# Patient Record
Sex: MALE | Race: WHITE | NOT HISPANIC OR LATINO | Employment: OTHER | URBAN - METROPOLITAN AREA
[De-identification: names, ages, dates, MRNs, and addresses within clinical notes are randomized per-mention and may not be internally consistent; named-entity substitution may affect disease eponyms.]

---

## 2017-01-19 ENCOUNTER — HOSPITAL ENCOUNTER (OUTPATIENT)
Dept: CARDIOLOGY | Facility: HOSPITAL | Age: 82
Setting detail: RECURRING SERIES
Discharge: HOME | End: 2017-01-19

## 2017-01-19 PROCEDURE — 36416 COLLJ CAPILLARY BLOOD SPEC: CPT | Performed by: INTERNAL MEDICINE

## 2017-01-19 PROCEDURE — 85610 PROTHROMBIN TIME: CPT | Performed by: INTERNAL MEDICINE

## 2017-01-27 ENCOUNTER — HOSPITAL ENCOUNTER (OUTPATIENT)
Dept: CARDIOLOGY | Facility: HOSPITAL | Age: 82
Setting detail: RECURRING SERIES
Discharge: HOME | End: 2017-01-27

## 2017-01-27 PROCEDURE — 85610 PROTHROMBIN TIME: CPT | Performed by: INTERNAL MEDICINE

## 2017-01-27 PROCEDURE — 36416 COLLJ CAPILLARY BLOOD SPEC: CPT | Performed by: INTERNAL MEDICINE

## 2017-02-03 ENCOUNTER — HOSPITAL ENCOUNTER (OUTPATIENT)
Dept: CARDIOLOGY | Facility: HOSPITAL | Age: 82
Setting detail: RECURRING SERIES
Discharge: HOME | End: 2017-02-03

## 2017-02-03 ENCOUNTER — CLINICAL SUPPORT NO REQUIREMENTS (OUTPATIENT)
Dept: CARDIOLOGY | Facility: CLINIC | Age: 82
End: 2017-02-03

## 2017-02-03 DIAGNOSIS — I47.20 VENTRICULAR TACHYCARDIA (HCC): Primary | ICD-10-CM

## 2017-02-03 PROCEDURE — 36416 COLLJ CAPILLARY BLOOD SPEC: CPT

## 2017-02-03 PROCEDURE — 93296 REM INTERROG EVL PM/IDS: CPT | Performed by: INTERNAL MEDICINE

## 2017-02-03 PROCEDURE — 85610 PROTHROMBIN TIME: CPT

## 2017-02-03 PROCEDURE — 93295 DEV INTERROG REMOTE 1/2/MLT: CPT | Performed by: INTERNAL MEDICINE

## 2017-02-10 ENCOUNTER — HOSPITAL ENCOUNTER (OUTPATIENT)
Dept: CARDIOLOGY | Facility: HOSPITAL | Age: 82
Setting detail: RECURRING SERIES
Discharge: HOME | End: 2017-02-10

## 2017-02-10 PROCEDURE — 85610 PROTHROMBIN TIME: CPT

## 2017-02-10 PROCEDURE — 36416 COLLJ CAPILLARY BLOOD SPEC: CPT

## 2017-02-20 ENCOUNTER — HOSPITAL ENCOUNTER (OUTPATIENT)
Dept: SLEEP MEDICINE | Facility: HOSPITAL | Age: 82
Discharge: HOME | End: 2017-02-20
Admitting: INTERNAL MEDICINE

## 2017-02-20 PROCEDURE — G0463 HOSPITAL OUTPT CLINIC VISIT: HCPCS

## 2017-02-22 ENCOUNTER — OFFICE VISIT (OUTPATIENT)
Dept: ONCOLOGY | Facility: CLINIC | Age: 82
End: 2017-02-22

## 2017-02-22 ENCOUNTER — LAB (OUTPATIENT)
Dept: LAB | Facility: HOSPITAL | Age: 82
End: 2017-02-22

## 2017-02-22 VITALS
HEIGHT: 70 IN | WEIGHT: 227 LBS | RESPIRATION RATE: 16 BRPM | BODY MASS INDEX: 32.5 KG/M2 | OXYGEN SATURATION: 95 % | DIASTOLIC BLOOD PRESSURE: 52 MMHG | SYSTOLIC BLOOD PRESSURE: 100 MMHG | HEART RATE: 68 BPM | TEMPERATURE: 98.3 F

## 2017-02-22 DIAGNOSIS — G40.909 SEIZURE DISORDER (HCC): ICD-10-CM

## 2017-02-22 DIAGNOSIS — D12.6 TUBULAR ADENOMA OF COLON: Primary | ICD-10-CM

## 2017-02-22 DIAGNOSIS — K92.2 GASTROINTESTINAL HEMORRHAGE: ICD-10-CM

## 2017-02-22 LAB
ALBUMIN SERPL-MCNC: 4.1 G/DL (ref 3.5–5.2)
ALBUMIN/GLOB SERPL: 1.4 G/DL (ref 1.1–2.4)
ALP SERPL-CCNC: 83 U/L (ref 38–116)
ALT SERPL W P-5'-P-CCNC: 14 U/L (ref 0–41)
ANION GAP SERPL CALCULATED.3IONS-SCNC: 13.6 MMOL/L
AST SERPL-CCNC: 17 U/L (ref 0–40)
BASOPHILS # BLD AUTO: 0.01 10*3/MM3 (ref 0–0.1)
BASOPHILS NFR BLD AUTO: 0.2 % (ref 0–1.1)
BILIRUB SERPL-MCNC: 0.9 MG/DL (ref 0.1–1.2)
BUN BLD-MCNC: 19 MG/DL (ref 6–20)
BUN/CREAT SERPL: 16 (ref 7.3–30)
CALCIUM SPEC-SCNC: 9.4 MG/DL (ref 8.5–10.2)
CHLORIDE SERPL-SCNC: 100 MMOL/L (ref 98–107)
CO2 SERPL-SCNC: 28.4 MMOL/L (ref 22–29)
CREAT BLD-MCNC: 1.19 MG/DL (ref 0.7–1.3)
DEPRECATED RDW RBC AUTO: 48.4 FL (ref 37–49)
EOSINOPHIL # BLD AUTO: 0.13 10*3/MM3 (ref 0–0.36)
EOSINOPHIL NFR BLD AUTO: 2.8 % (ref 1–5)
ERYTHROCYTE [DISTWIDTH] IN BLOOD BY AUTOMATED COUNT: 13 % (ref 11.7–14.5)
FERRITIN SERPL-MCNC: 296.1 NG/ML (ref 30–400)
GFR SERPL CREATININE-BSD FRML MDRD: 58 ML/MIN/1.73
GLOBULIN UR ELPH-MCNC: 2.9 GM/DL (ref 1.8–3.5)
GLUCOSE BLD-MCNC: 207 MG/DL (ref 74–124)
HCT VFR BLD AUTO: 40.9 % (ref 40–49)
HGB BLD-MCNC: 13.1 G/DL (ref 13.5–16.5)
IMM GRANULOCYTES # BLD: 0.03 10*3/MM3 (ref 0–0.03)
IMM GRANULOCYTES NFR BLD: 0.7 % (ref 0–0.5)
LYMPHOCYTES # BLD AUTO: 1.03 10*3/MM3 (ref 1–3.5)
LYMPHOCYTES NFR BLD AUTO: 22.5 % (ref 20–49)
MCH RBC QN AUTO: 32.4 PG (ref 27–33)
MCHC RBC AUTO-ENTMCNC: 32 G/DL (ref 32–35)
MCV RBC AUTO: 101.2 FL (ref 83–97)
MONOCYTES # BLD AUTO: 0.41 10*3/MM3 (ref 0.25–0.8)
MONOCYTES NFR BLD AUTO: 9 % (ref 4–12)
NEUTROPHILS # BLD AUTO: 2.96 10*3/MM3 (ref 1.5–7)
NEUTROPHILS NFR BLD AUTO: 64.8 % (ref 39–75)
NRBC BLD MANUAL-RTO: 0 /100 WBC (ref 0–0)
PLATELET # BLD AUTO: 131 10*3/MM3 (ref 150–375)
PMV BLD AUTO: 10.4 FL (ref 8.9–12.1)
POTASSIUM BLD-SCNC: 4.6 MMOL/L (ref 3.5–4.7)
PROT SERPL-MCNC: 7 G/DL (ref 6.3–8)
RBC # BLD AUTO: 4.04 10*6/MM3 (ref 4.3–5.5)
SODIUM BLD-SCNC: 142 MMOL/L (ref 134–145)
WBC NRBC COR # BLD: 4.57 10*3/MM3 (ref 4–10)

## 2017-02-22 PROCEDURE — 80053 COMPREHEN METABOLIC PANEL: CPT

## 2017-02-22 PROCEDURE — 85025 COMPLETE CBC W/AUTO DIFF WBC: CPT

## 2017-02-22 PROCEDURE — 99213 OFFICE O/P EST LOW 20 MIN: CPT | Performed by: INTERNAL MEDICINE

## 2017-02-22 PROCEDURE — 82728 ASSAY OF FERRITIN: CPT

## 2017-02-22 PROCEDURE — 36415 COLL VENOUS BLD VENIPUNCTURE: CPT

## 2017-03-06 ENCOUNTER — HOSPITAL ENCOUNTER (OUTPATIENT)
Dept: CARDIOLOGY | Facility: HOSPITAL | Age: 82
Setting detail: RECURRING SERIES
Discharge: HOME | End: 2017-03-06

## 2017-03-06 PROCEDURE — 36416 COLLJ CAPILLARY BLOOD SPEC: CPT

## 2017-03-06 PROCEDURE — 85610 PROTHROMBIN TIME: CPT

## 2017-03-13 ENCOUNTER — HOSPITAL ENCOUNTER (OUTPATIENT)
Dept: CARDIOLOGY | Facility: HOSPITAL | Age: 82
Setting detail: RECURRING SERIES
Discharge: HOME | End: 2017-03-13

## 2017-03-13 PROCEDURE — 36416 COLLJ CAPILLARY BLOOD SPEC: CPT

## 2017-03-13 PROCEDURE — 85610 PROTHROMBIN TIME: CPT

## 2017-03-20 ENCOUNTER — HOSPITAL ENCOUNTER (OUTPATIENT)
Dept: CARDIOLOGY | Facility: HOSPITAL | Age: 82
Setting detail: RECURRING SERIES
Discharge: HOME | End: 2017-03-20

## 2017-03-20 PROCEDURE — 85610 PROTHROMBIN TIME: CPT

## 2017-03-20 PROCEDURE — 36416 COLLJ CAPILLARY BLOOD SPEC: CPT

## 2017-04-03 ENCOUNTER — HOSPITAL ENCOUNTER (OUTPATIENT)
Dept: CARDIOLOGY | Facility: HOSPITAL | Age: 82
Setting detail: RECURRING SERIES
Discharge: HOME | End: 2017-04-03

## 2017-04-03 PROCEDURE — 36416 COLLJ CAPILLARY BLOOD SPEC: CPT

## 2017-04-03 PROCEDURE — 85610 PROTHROMBIN TIME: CPT

## 2017-04-12 RX ORDER — WARFARIN SODIUM 5 MG/1
TABLET ORAL
Qty: 90 TABLET | Refills: 0 | Status: SHIPPED | OUTPATIENT
Start: 2017-04-12 | End: 2017-06-15 | Stop reason: SDUPTHER

## 2017-04-19 ENCOUNTER — LAB (OUTPATIENT)
Dept: LAB | Facility: HOSPITAL | Age: 82
End: 2017-04-19

## 2017-04-19 ENCOUNTER — CLINICAL SUPPORT (OUTPATIENT)
Dept: ONCOLOGY | Facility: HOSPITAL | Age: 82
End: 2017-04-19

## 2017-04-19 DIAGNOSIS — G40.909 SEIZURE DISORDER (HCC): ICD-10-CM

## 2017-04-19 DIAGNOSIS — D12.6 TUBULAR ADENOMA OF COLON: ICD-10-CM

## 2017-04-19 LAB
BASOPHILS # BLD AUTO: 0.02 10*3/MM3 (ref 0–0.1)
BASOPHILS NFR BLD AUTO: 0.4 % (ref 0–1.1)
DEPRECATED RDW RBC AUTO: 47.1 FL (ref 37–49)
EOSINOPHIL # BLD AUTO: 0.12 10*3/MM3 (ref 0–0.36)
EOSINOPHIL NFR BLD AUTO: 2.4 % (ref 1–5)
ERYTHROCYTE [DISTWIDTH] IN BLOOD BY AUTOMATED COUNT: 13.2 % (ref 11.7–14.5)
HCT VFR BLD AUTO: 35.8 % (ref 40–49)
HGB BLD-MCNC: 12.5 G/DL (ref 13.5–16.5)
IMM GRANULOCYTES # BLD: 0.04 10*3/MM3 (ref 0–0.03)
IMM GRANULOCYTES NFR BLD: 0.8 % (ref 0–0.5)
LYMPHOCYTES # BLD AUTO: 1.06 10*3/MM3 (ref 1–3.5)
LYMPHOCYTES NFR BLD AUTO: 21.6 % (ref 20–49)
MCH RBC QN AUTO: 34.4 PG (ref 27–33)
MCHC RBC AUTO-ENTMCNC: 34.9 G/DL (ref 32–35)
MCV RBC AUTO: 98.6 FL (ref 83–97)
MONOCYTES # BLD AUTO: 0.53 10*3/MM3 (ref 0.25–0.8)
MONOCYTES NFR BLD AUTO: 10.8 % (ref 4–12)
NEUTROPHILS # BLD AUTO: 3.14 10*3/MM3 (ref 1.5–7)
NEUTROPHILS NFR BLD AUTO: 64 % (ref 39–75)
NRBC BLD MANUAL-RTO: 0 /100 WBC (ref 0–0)
PLATELET # BLD AUTO: 130 10*3/MM3 (ref 150–375)
PMV BLD AUTO: 11 FL (ref 8.9–12.1)
RBC # BLD AUTO: 3.63 10*6/MM3 (ref 4.3–5.5)
WBC NRBC COR # BLD: 4.91 10*3/MM3 (ref 4–10)

## 2017-04-19 PROCEDURE — 85025 COMPLETE CBC W/AUTO DIFF WBC: CPT

## 2017-04-19 PROCEDURE — 36416 COLLJ CAPILLARY BLOOD SPEC: CPT

## 2017-04-24 ENCOUNTER — CLINICAL SUPPORT NO REQUIREMENTS (OUTPATIENT)
Dept: CARDIOLOGY | Facility: CLINIC | Age: 82
End: 2017-04-24

## 2017-04-24 ENCOUNTER — OFFICE VISIT (OUTPATIENT)
Dept: CARDIOLOGY | Facility: CLINIC | Age: 82
End: 2017-04-24

## 2017-04-24 VITALS
SYSTOLIC BLOOD PRESSURE: 118 MMHG | BODY MASS INDEX: 32.64 KG/M2 | DIASTOLIC BLOOD PRESSURE: 64 MMHG | HEART RATE: 57 BPM | HEIGHT: 70 IN | WEIGHT: 228 LBS

## 2017-04-24 DIAGNOSIS — Z79.01 WARFARIN ANTICOAGULATION: ICD-10-CM

## 2017-04-24 DIAGNOSIS — I48.19 PERSISTENT ATRIAL FIBRILLATION (HCC): ICD-10-CM

## 2017-04-24 DIAGNOSIS — I47.20 VENTRICULAR TACHYCARDIA (HCC): Primary | ICD-10-CM

## 2017-04-24 DIAGNOSIS — I48.20 CHRONIC ATRIAL FIBRILLATION (HCC): ICD-10-CM

## 2017-04-24 DIAGNOSIS — I71.20 THORACIC AORTIC ANEURYSM WITHOUT RUPTURE (HCC): Primary | ICD-10-CM

## 2017-04-24 DIAGNOSIS — I49.5 SICK SINUS SYNDROME (HCC): ICD-10-CM

## 2017-04-24 DIAGNOSIS — I10 ESSENTIAL HYPERTENSION: ICD-10-CM

## 2017-04-24 DIAGNOSIS — I71.21 ANEURYSM OF AORTIC ROOT (HCC): ICD-10-CM

## 2017-04-24 DIAGNOSIS — I42.1 HYPERTROPHIC OBSTRUCTIVE CARDIOMYOPATHY (HCC): ICD-10-CM

## 2017-04-24 DIAGNOSIS — G47.33 OBSTRUCTIVE SLEEP APNEA SYNDROME: ICD-10-CM

## 2017-04-24 PROCEDURE — 99213 OFFICE O/P EST LOW 20 MIN: CPT | Performed by: INTERNAL MEDICINE

## 2017-04-24 PROCEDURE — 93283 PRGRMG EVAL IMPLANTABLE DFB: CPT | Performed by: INTERNAL MEDICINE

## 2017-04-24 PROCEDURE — 93000 ELECTROCARDIOGRAM COMPLETE: CPT | Performed by: INTERNAL MEDICINE

## 2017-04-24 RX ORDER — FUROSEMIDE 20 MG/1
20 TABLET ORAL DAILY
COMMUNITY
End: 2017-10-26 | Stop reason: SDUPTHER

## 2017-04-24 RX ORDER — LANOLIN ALCOHOL/MO/W.PET/CERES
1000 CREAM (GRAM) TOPICAL DAILY
COMMUNITY
End: 2018-10-12

## 2017-05-01 ENCOUNTER — HOSPITAL ENCOUNTER (OUTPATIENT)
Dept: CARDIOLOGY | Facility: HOSPITAL | Age: 82
Setting detail: RECURRING SERIES
Discharge: HOME | End: 2017-05-01

## 2017-05-01 PROCEDURE — 85610 PROTHROMBIN TIME: CPT

## 2017-05-01 PROCEDURE — 36416 COLLJ CAPILLARY BLOOD SPEC: CPT

## 2017-05-08 ENCOUNTER — HOSPITAL ENCOUNTER (OUTPATIENT)
Dept: CARDIOLOGY | Facility: HOSPITAL | Age: 82
Setting detail: RECURRING SERIES
Discharge: HOME | End: 2017-05-08

## 2017-05-08 PROCEDURE — 36416 COLLJ CAPILLARY BLOOD SPEC: CPT

## 2017-05-08 PROCEDURE — 85610 PROTHROMBIN TIME: CPT

## 2017-05-10 DIAGNOSIS — I10 ESSENTIAL HYPERTENSION: Primary | ICD-10-CM

## 2017-05-10 DIAGNOSIS — E11.29 CONTROLLED TYPE 2 DIABETES MELLITUS WITH MICROALBUMINURIA, WITHOUT LONG-TERM CURRENT USE OF INSULIN (HCC): ICD-10-CM

## 2017-05-10 DIAGNOSIS — I42.1 HYPERTROPHIC OBSTRUCTIVE CARDIOMYOPATHY (HCC): ICD-10-CM

## 2017-05-10 DIAGNOSIS — R80.9 CONTROLLED TYPE 2 DIABETES MELLITUS WITH MICROALBUMINURIA, WITHOUT LONG-TERM CURRENT USE OF INSULIN (HCC): ICD-10-CM

## 2017-05-13 LAB
ALBUMIN SERPL-MCNC: 4.3 G/DL (ref 3.5–5.2)
ALBUMIN/CREAT UR: 16.8 MG/G CREAT (ref 0–30)
ALBUMIN/GLOB SERPL: 1.5 G/DL
ALP SERPL-CCNC: 76 U/L (ref 39–117)
ALT SERPL-CCNC: 17 U/L (ref 1–41)
APPEARANCE UR: CLEAR
AST SERPL-CCNC: 17 U/L (ref 1–40)
BACTERIA #/AREA URNS HPF: ABNORMAL /HPF
BACTERIA UR CULT: NORMAL
BACTERIA UR CULT: NORMAL
BASOPHILS # BLD AUTO: 0.01 10*3/MM3 (ref 0–0.2)
BASOPHILS NFR BLD AUTO: 0.2 % (ref 0–1.5)
BILIRUB SERPL-MCNC: 0.9 MG/DL (ref 0.1–1.2)
BILIRUB UR QL STRIP: NEGATIVE
BUN SERPL-MCNC: 18 MG/DL (ref 8–23)
BUN/CREAT SERPL: 15.3 (ref 7–25)
CALCIUM SERPL-MCNC: 9.8 MG/DL (ref 8.6–10.5)
CHLORIDE SERPL-SCNC: 99 MMOL/L (ref 98–107)
CO2 SERPL-SCNC: 28.1 MMOL/L (ref 22–29)
COLOR UR: YELLOW
CREAT SERPL-MCNC: 1.18 MG/DL (ref 0.76–1.27)
CREAT UR-MCNC: 140.2 MG/DL
CRYSTALS URNS MICRO: ABNORMAL
EOSINOPHIL # BLD AUTO: 0.09 10*3/MM3 (ref 0–0.7)
EOSINOPHIL NFR BLD AUTO: 1.8 % (ref 0.3–6.2)
EPI CELLS #/AREA URNS HPF: ABNORMAL /HPF
ERYTHROCYTE [DISTWIDTH] IN BLOOD BY AUTOMATED COUNT: 13.8 % (ref 11.5–14.5)
GLOBULIN SER CALC-MCNC: 2.8 GM/DL
GLUCOSE SERPL-MCNC: 230 MG/DL (ref 65–99)
GLUCOSE UR QL: NEGATIVE
HBA1C MFR BLD: 7.25 % (ref 4.8–5.6)
HCT VFR BLD AUTO: 39.3 % (ref 40.4–52.2)
HGB BLD-MCNC: 12.6 G/DL (ref 13.7–17.6)
HGB UR QL STRIP: ABNORMAL
IMM GRANULOCYTES # BLD: 0 10*3/MM3 (ref 0–0.03)
IMM GRANULOCYTES NFR BLD: 0 % (ref 0–0.5)
KETONES UR QL STRIP: NEGATIVE
LEUKOCYTE ESTERASE UR QL STRIP: ABNORMAL
LYMPHOCYTES # BLD AUTO: 1.12 10*3/MM3 (ref 0.9–4.8)
LYMPHOCYTES NFR BLD AUTO: 22.3 % (ref 19.6–45.3)
MCH RBC QN AUTO: 33.2 PG (ref 27–32.7)
MCHC RBC AUTO-ENTMCNC: 32.1 G/DL (ref 32.6–36.4)
MCV RBC AUTO: 103.7 FL (ref 79.8–96.2)
MICRO URNS: ABNORMAL
MICROALBUMIN UR-MCNC: 23.6 UG/ML
MONOCYTES # BLD AUTO: 0.45 10*3/MM3 (ref 0.2–1.2)
MONOCYTES NFR BLD AUTO: 8.9 % (ref 5–12)
MUCOUS THREADS URNS QL MICRO: PRESENT /HPF
NEUTROPHILS # BLD AUTO: 3.36 10*3/MM3 (ref 1.9–8.1)
NEUTROPHILS NFR BLD AUTO: 66.8 % (ref 42.7–76)
NITRITE UR QL STRIP: POSITIVE
PH UR STRIP: 5.5 [PH] (ref 5–7.5)
PLATELET # BLD AUTO: 137 10*3/MM3 (ref 140–500)
POTASSIUM SERPL-SCNC: 4.5 MMOL/L (ref 3.5–5.2)
PROT SERPL-MCNC: 7.1 G/DL (ref 6–8.5)
PROT UR QL STRIP: NEGATIVE
RBC # BLD AUTO: 3.79 10*6/MM3 (ref 4.6–6)
RBC #/AREA URNS HPF: ABNORMAL /HPF
SODIUM SERPL-SCNC: 141 MMOL/L (ref 136–145)
SP GR UR: 1.02 (ref 1–1.03)
UNIDENT CRYS URNS QL MICRO: PRESENT /LPF
URINALYSIS REFLEX: ABNORMAL
UROBILINOGEN UR STRIP-MCNC: 0.2 MG/DL (ref 0.2–1)
WBC # BLD AUTO: 5.03 10*3/MM3 (ref 4.5–10.7)
WBC #/AREA URNS HPF: >30 /HPF

## 2017-05-18 ENCOUNTER — OFFICE VISIT (OUTPATIENT)
Dept: INTERNAL MEDICINE | Facility: CLINIC | Age: 82
End: 2017-05-18

## 2017-05-18 VITALS
BODY MASS INDEX: 32.78 KG/M2 | DIASTOLIC BLOOD PRESSURE: 80 MMHG | SYSTOLIC BLOOD PRESSURE: 124 MMHG | HEIGHT: 70 IN | TEMPERATURE: 97.9 F | WEIGHT: 229 LBS | OXYGEN SATURATION: 94 % | HEART RATE: 64 BPM

## 2017-05-18 DIAGNOSIS — R40.0 DAYTIME SOMNOLENCE: ICD-10-CM

## 2017-05-18 DIAGNOSIS — R80.9 CONTROLLED TYPE 2 DIABETES MELLITUS WITH MICROALBUMINURIA, WITHOUT LONG-TERM CURRENT USE OF INSULIN (HCC): ICD-10-CM

## 2017-05-18 DIAGNOSIS — R82.90 ABNORMAL URINALYSIS: ICD-10-CM

## 2017-05-18 DIAGNOSIS — E55.9 VITAMIN D DEFICIENCY: ICD-10-CM

## 2017-05-18 DIAGNOSIS — E11.29 CONTROLLED TYPE 2 DIABETES MELLITUS WITH MICROALBUMINURIA, WITHOUT LONG-TERM CURRENT USE OF INSULIN (HCC): ICD-10-CM

## 2017-05-18 DIAGNOSIS — G40.909 SEIZURE DISORDER (HCC): ICD-10-CM

## 2017-05-18 DIAGNOSIS — I10 ESSENTIAL HYPERTENSION: Primary | ICD-10-CM

## 2017-05-18 DIAGNOSIS — R80.9 MICROALBUMINURIA: ICD-10-CM

## 2017-05-18 DIAGNOSIS — G47.33 OBSTRUCTIVE SLEEP APNEA SYNDROME: ICD-10-CM

## 2017-05-18 PROCEDURE — 99214 OFFICE O/P EST MOD 30 MIN: CPT | Performed by: INTERNAL MEDICINE

## 2017-05-22 ENCOUNTER — HOSPITAL ENCOUNTER (OUTPATIENT)
Dept: CARDIOLOGY | Facility: HOSPITAL | Age: 82
Setting detail: RECURRING SERIES
Discharge: HOME | End: 2017-05-22

## 2017-05-22 LAB
APPEARANCE UR: CLEAR
BACTERIA #/AREA URNS HPF: ABNORMAL /HPF
BACTERIA UR CULT: NORMAL
BACTERIA UR CULT: NORMAL
BILIRUB UR QL STRIP: NEGATIVE
CASTS URNS MICRO: ABNORMAL
CASTS URNS QL MICRO: PRESENT /LPF
COLOR UR: YELLOW
EPI CELLS #/AREA URNS HPF: ABNORMAL /HPF
GLUCOSE UR QL: NEGATIVE
HGB UR QL STRIP: ABNORMAL
KETONES UR QL STRIP: NEGATIVE
LEUKOCYTE ESTERASE UR QL STRIP: ABNORMAL
MICRO URNS: ABNORMAL
MUCOUS THREADS URNS QL MICRO: PRESENT /HPF
NITRITE UR QL STRIP: POSITIVE
PH UR STRIP: 5.5 [PH] (ref 5–7.5)
PROT UR QL STRIP: NEGATIVE
RBC #/AREA URNS HPF: ABNORMAL /HPF
SP GR UR: 1.01 (ref 1–1.03)
URINALYSIS REFLEX: ABNORMAL
UROBILINOGEN UR STRIP-MCNC: 0.2 MG/DL (ref 0.2–1)
WBC #/AREA URNS HPF: ABNORMAL /HPF

## 2017-05-22 PROCEDURE — 36416 COLLJ CAPILLARY BLOOD SPEC: CPT

## 2017-05-22 PROCEDURE — 85610 PROTHROMBIN TIME: CPT

## 2017-06-14 ENCOUNTER — HOSPITAL ENCOUNTER (OUTPATIENT)
Dept: CARDIOLOGY | Facility: HOSPITAL | Age: 82
Setting detail: RECURRING SERIES
Discharge: HOME | End: 2017-06-14

## 2017-06-14 ENCOUNTER — CLINICAL SUPPORT (OUTPATIENT)
Dept: ONCOLOGY | Facility: HOSPITAL | Age: 82
End: 2017-06-14

## 2017-06-14 ENCOUNTER — LAB (OUTPATIENT)
Dept: LAB | Facility: HOSPITAL | Age: 82
End: 2017-06-14

## 2017-06-14 DIAGNOSIS — D12.6 TUBULAR ADENOMA OF COLON: ICD-10-CM

## 2017-06-14 DIAGNOSIS — G40.909 SEIZURE DISORDER (HCC): ICD-10-CM

## 2017-06-14 LAB
BASOPHILS # BLD AUTO: 0.02 10*3/MM3 (ref 0–0.1)
BASOPHILS NFR BLD AUTO: 0.4 % (ref 0–1.1)
DEPRECATED RDW RBC AUTO: 47.6 FL (ref 37–49)
EOSINOPHIL # BLD AUTO: 0.15 10*3/MM3 (ref 0–0.36)
EOSINOPHIL NFR BLD AUTO: 2.8 % (ref 1–5)
ERYTHROCYTE [DISTWIDTH] IN BLOOD BY AUTOMATED COUNT: 12.9 % (ref 11.7–14.5)
HCT VFR BLD AUTO: 39.4 % (ref 40–49)
HGB BLD-MCNC: 13.4 G/DL (ref 13.5–16.5)
IMM GRANULOCYTES # BLD: 0.04 10*3/MM3 (ref 0–0.03)
IMM GRANULOCYTES NFR BLD: 0.7 % (ref 0–0.5)
LYMPHOCYTES # BLD AUTO: 1.37 10*3/MM3 (ref 1–3.5)
LYMPHOCYTES NFR BLD AUTO: 25.6 % (ref 20–49)
MCH RBC QN AUTO: 34.1 PG (ref 27–33)
MCHC RBC AUTO-ENTMCNC: 34 G/DL (ref 32–35)
MCV RBC AUTO: 100.3 FL (ref 83–97)
MONOCYTES # BLD AUTO: 0.6 10*3/MM3 (ref 0.25–0.8)
MONOCYTES NFR BLD AUTO: 11.2 % (ref 4–12)
NEUTROPHILS # BLD AUTO: 3.17 10*3/MM3 (ref 1.5–7)
NEUTROPHILS NFR BLD AUTO: 59.3 % (ref 39–75)
NRBC BLD MANUAL-RTO: 0 /100 WBC (ref 0–0)
PLATELET # BLD AUTO: 117 10*3/MM3 (ref 150–375)
PMV BLD AUTO: 11.2 FL (ref 8.9–12.1)
RBC # BLD AUTO: 3.93 10*6/MM3 (ref 4.3–5.5)
WBC NRBC COR # BLD: 5.35 10*3/MM3 (ref 4–10)

## 2017-06-14 PROCEDURE — 85610 PROTHROMBIN TIME: CPT

## 2017-06-14 PROCEDURE — 36416 COLLJ CAPILLARY BLOOD SPEC: CPT

## 2017-06-14 PROCEDURE — 85025 COMPLETE CBC W/AUTO DIFF WBC: CPT

## 2017-06-15 RX ORDER — WARFARIN SODIUM 5 MG/1
TABLET ORAL
Qty: 90 TABLET | Refills: 0 | Status: SHIPPED | OUTPATIENT
Start: 2017-06-15 | End: 2017-09-05 | Stop reason: SDUPTHER

## 2017-06-21 ENCOUNTER — HOSPITAL ENCOUNTER (OUTPATIENT)
Dept: CARDIOLOGY | Facility: HOSPITAL | Age: 82
Setting detail: RECURRING SERIES
Discharge: HOME | End: 2017-06-21

## 2017-06-21 PROCEDURE — 36416 COLLJ CAPILLARY BLOOD SPEC: CPT

## 2017-06-21 PROCEDURE — 85610 PROTHROMBIN TIME: CPT

## 2017-06-28 ENCOUNTER — HOSPITAL ENCOUNTER (OUTPATIENT)
Dept: CARDIOLOGY | Facility: HOSPITAL | Age: 82
Setting detail: RECURRING SERIES
Discharge: HOME | End: 2017-06-28

## 2017-06-28 PROCEDURE — 85610 PROTHROMBIN TIME: CPT

## 2017-06-28 PROCEDURE — 36416 COLLJ CAPILLARY BLOOD SPEC: CPT

## 2017-07-13 ENCOUNTER — HOSPITAL ENCOUNTER (OUTPATIENT)
Dept: CARDIOLOGY | Facility: HOSPITAL | Age: 82
Setting detail: RECURRING SERIES
Discharge: HOME | End: 2017-07-13

## 2017-07-13 PROCEDURE — 85610 PROTHROMBIN TIME: CPT

## 2017-07-13 PROCEDURE — 36416 COLLJ CAPILLARY BLOOD SPEC: CPT

## 2017-07-26 RX ORDER — FOLIC ACID 1 MG/1
TABLET ORAL
Qty: 90 TABLET | Refills: 3 | Status: SHIPPED | OUTPATIENT
Start: 2017-07-26 | End: 2017-07-27 | Stop reason: SDUPTHER

## 2017-07-26 RX ORDER — POTASSIUM CHLORIDE 750 MG/1
TABLET, EXTENDED RELEASE ORAL
Qty: 90 TABLET | Refills: 3 | Status: SHIPPED | OUTPATIENT
Start: 2017-07-26 | End: 2017-07-27 | Stop reason: SDUPTHER

## 2017-07-27 RX ORDER — DIGOXIN 125 MCG
TABLET ORAL
Qty: 90 TABLET | Refills: 0 | Status: SHIPPED | OUTPATIENT
Start: 2017-07-27 | End: 2017-09-29 | Stop reason: SDUPTHER

## 2017-07-28 RX ORDER — FOLIC ACID 1 MG/1
TABLET ORAL
Qty: 90 TABLET | Refills: 3 | Status: SHIPPED | OUTPATIENT
Start: 2017-07-28 | End: 2017-10-05 | Stop reason: SDUPTHER

## 2017-07-28 RX ORDER — POTASSIUM CHLORIDE 750 MG/1
TABLET, EXTENDED RELEASE ORAL
Qty: 90 TABLET | Refills: 3 | Status: SHIPPED | OUTPATIENT
Start: 2017-07-28 | End: 2017-10-05 | Stop reason: SDUPTHER

## 2017-08-11 ENCOUNTER — HOSPITAL ENCOUNTER (OUTPATIENT)
Dept: CARDIOLOGY | Facility: HOSPITAL | Age: 82
Setting detail: RECURRING SERIES
Discharge: HOME | End: 2017-08-11

## 2017-08-11 PROCEDURE — 85610 PROTHROMBIN TIME: CPT

## 2017-08-11 PROCEDURE — 36416 COLLJ CAPILLARY BLOOD SPEC: CPT

## 2017-08-15 RX ORDER — ATORVASTATIN CALCIUM 20 MG/1
TABLET, FILM COATED ORAL
Qty: 90 TABLET | Refills: 3 | Status: SHIPPED | OUTPATIENT
Start: 2017-08-15 | End: 2018-08-27 | Stop reason: SDUPTHER

## 2017-08-21 DIAGNOSIS — I10 ESSENTIAL HYPERTENSION: Primary | ICD-10-CM

## 2017-08-21 DIAGNOSIS — R80.9 MICROALBUMINURIA: ICD-10-CM

## 2017-08-21 DIAGNOSIS — E11.29 CONTROLLED TYPE 2 DIABETES MELLITUS WITH MICROALBUMINURIA, WITHOUT LONG-TERM CURRENT USE OF INSULIN (HCC): ICD-10-CM

## 2017-08-21 DIAGNOSIS — R80.9 CONTROLLED TYPE 2 DIABETES MELLITUS WITH MICROALBUMINURIA, WITHOUT LONG-TERM CURRENT USE OF INSULIN (HCC): ICD-10-CM

## 2017-08-22 ENCOUNTER — LAB (OUTPATIENT)
Dept: LAB | Facility: HOSPITAL | Age: 82
End: 2017-08-22

## 2017-08-22 ENCOUNTER — OFFICE VISIT (OUTPATIENT)
Dept: ONCOLOGY | Facility: CLINIC | Age: 82
End: 2017-08-22

## 2017-08-22 VITALS
OXYGEN SATURATION: 95 % | DIASTOLIC BLOOD PRESSURE: 78 MMHG | BODY MASS INDEX: 32.58 KG/M2 | HEART RATE: 62 BPM | SYSTOLIC BLOOD PRESSURE: 146 MMHG | WEIGHT: 227.6 LBS | TEMPERATURE: 97.9 F | RESPIRATION RATE: 18 BRPM | HEIGHT: 70 IN

## 2017-08-22 DIAGNOSIS — G40.909 SEIZURE DISORDER (HCC): ICD-10-CM

## 2017-08-22 DIAGNOSIS — D12.6 TUBULAR ADENOMA OF COLON: ICD-10-CM

## 2017-08-22 DIAGNOSIS — Z79.01 WARFARIN ANTICOAGULATION: Primary | ICD-10-CM

## 2017-08-22 DIAGNOSIS — R58 BLEEDING FROM UNKNOWN SITE: ICD-10-CM

## 2017-08-22 LAB
ALBUMIN SERPL-MCNC: 4.2 G/DL (ref 3.5–5.2)
ALBUMIN/GLOB SERPL: 1.5 G/DL (ref 1.1–2.4)
ALP SERPL-CCNC: 78 U/L (ref 38–116)
ALT SERPL W P-5'-P-CCNC: 14 U/L (ref 0–41)
ANION GAP SERPL CALCULATED.3IONS-SCNC: 10.6 MMOL/L
AST SERPL-CCNC: 15 U/L (ref 0–40)
BASOPHILS # BLD AUTO: 0.01 10*3/MM3 (ref 0–0.1)
BASOPHILS NFR BLD AUTO: 0.2 % (ref 0–1.1)
BILIRUB SERPL-MCNC: 0.6 MG/DL (ref 0.1–1.2)
BUN BLD-MCNC: 17 MG/DL (ref 6–20)
BUN/CREAT SERPL: 15 (ref 7.3–30)
CALCIUM SPEC-SCNC: 9.5 MG/DL (ref 8.5–10.2)
CHLORIDE SERPL-SCNC: 104 MMOL/L (ref 98–107)
CO2 SERPL-SCNC: 28.4 MMOL/L (ref 22–29)
CREAT BLD-MCNC: 1.13 MG/DL (ref 0.7–1.3)
DEPRECATED RDW RBC AUTO: 49.1 FL (ref 37–49)
EOSINOPHIL # BLD AUTO: 0.16 10*3/MM3 (ref 0–0.36)
EOSINOPHIL NFR BLD AUTO: 3.1 % (ref 1–5)
ERYTHROCYTE [DISTWIDTH] IN BLOOD BY AUTOMATED COUNT: 13.2 % (ref 11.7–14.5)
FERRITIN SERPL-MCNC: 198.8 NG/ML (ref 30–400)
GFR SERPL CREATININE-BSD FRML MDRD: 61 ML/MIN/1.73
GLOBULIN UR ELPH-MCNC: 2.8 GM/DL (ref 1.8–3.5)
GLUCOSE BLD-MCNC: 95 MG/DL (ref 74–124)
HCT VFR BLD AUTO: 38.5 % (ref 40–49)
HGB BLD-MCNC: 12.8 G/DL (ref 13.5–16.5)
HOLD SPECIMEN: NORMAL
IMM GRANULOCYTES # BLD: 0.03 10*3/MM3 (ref 0–0.03)
IMM GRANULOCYTES NFR BLD: 0.6 % (ref 0–0.5)
LYMPHOCYTES # BLD AUTO: 1.4 10*3/MM3 (ref 1–3.5)
LYMPHOCYTES NFR BLD AUTO: 26.9 % (ref 20–49)
MCH RBC QN AUTO: 33.8 PG (ref 27–33)
MCHC RBC AUTO-ENTMCNC: 33.2 G/DL (ref 32–35)
MCV RBC AUTO: 101.6 FL (ref 83–97)
MONOCYTES # BLD AUTO: 0.58 10*3/MM3 (ref 0.25–0.8)
MONOCYTES NFR BLD AUTO: 11.2 % (ref 4–12)
NEUTROPHILS # BLD AUTO: 3.02 10*3/MM3 (ref 1.5–7)
NEUTROPHILS NFR BLD AUTO: 58 % (ref 39–75)
NRBC BLD MANUAL-RTO: 0 /100 WBC (ref 0–0)
PLATELET # BLD AUTO: 130 10*3/MM3 (ref 150–375)
PMV BLD AUTO: 10 FL (ref 8.9–12.1)
POTASSIUM BLD-SCNC: 5.7 MMOL/L (ref 3.5–4.7)
PROT SERPL-MCNC: 7 G/DL (ref 6.3–8)
RBC # BLD AUTO: 3.79 10*6/MM3 (ref 4.3–5.5)
SODIUM BLD-SCNC: 143 MMOL/L (ref 134–145)
WBC NRBC COR # BLD: 5.2 10*3/MM3 (ref 4–10)

## 2017-08-22 PROCEDURE — 82728 ASSAY OF FERRITIN: CPT | Performed by: INTERNAL MEDICINE

## 2017-08-22 PROCEDURE — 80053 COMPREHEN METABOLIC PANEL: CPT | Performed by: INTERNAL MEDICINE

## 2017-08-22 PROCEDURE — 85025 COMPLETE CBC W/AUTO DIFF WBC: CPT | Performed by: INTERNAL MEDICINE

## 2017-08-22 PROCEDURE — 36415 COLL VENOUS BLD VENIPUNCTURE: CPT | Performed by: INTERNAL MEDICINE

## 2017-08-22 PROCEDURE — 99213 OFFICE O/P EST LOW 20 MIN: CPT | Performed by: INTERNAL MEDICINE

## 2017-08-23 LAB
ALBUMIN SERPL-MCNC: 4.5 G/DL (ref 3.5–5.2)
ALBUMIN/GLOB SERPL: 1.8 G/DL
ALP SERPL-CCNC: 82 U/L (ref 39–117)
ALT SERPL-CCNC: 13 U/L (ref 1–41)
AST SERPL-CCNC: 18 U/L (ref 1–40)
BASOPHILS # BLD AUTO: 0.01 10*3/MM3 (ref 0–0.2)
BASOPHILS NFR BLD AUTO: 0.2 % (ref 0–1.5)
BILIRUB SERPL-MCNC: 0.6 MG/DL (ref 0.1–1.2)
BUN SERPL-MCNC: 17 MG/DL (ref 8–23)
BUN/CREAT SERPL: 14.5 (ref 7–25)
CALCIUM SERPL-MCNC: 10 MG/DL (ref 8.6–10.5)
CHLORIDE SERPL-SCNC: 102 MMOL/L (ref 98–107)
CO2 SERPL-SCNC: 28.7 MMOL/L (ref 22–29)
CREAT SERPL-MCNC: 1.17 MG/DL (ref 0.76–1.27)
EOSINOPHIL # BLD AUTO: 0.13 10*3/MM3 (ref 0–0.7)
EOSINOPHIL NFR BLD AUTO: 2.7 % (ref 0.3–6.2)
ERYTHROCYTE [DISTWIDTH] IN BLOOD BY AUTOMATED COUNT: 13.7 % (ref 11.5–14.5)
FERRITIN SERPL-MCNC: 222.1 NG/ML (ref 30–400)
GLOBULIN SER CALC-MCNC: 2.5 GM/DL
GLUCOSE SERPL-MCNC: 115 MG/DL (ref 65–99)
HBA1C MFR BLD: 7.13 % (ref 4.8–5.6)
HCT VFR BLD AUTO: 40 % (ref 40.4–52.2)
HGB BLD-MCNC: 12.6 G/DL (ref 13.7–17.6)
IMM GRANULOCYTES # BLD: 0.02 10*3/MM3 (ref 0–0.03)
IMM GRANULOCYTES NFR BLD: 0.4 % (ref 0–0.5)
LYMPHOCYTES # BLD AUTO: 1.28 10*3/MM3 (ref 0.9–4.8)
LYMPHOCYTES NFR BLD AUTO: 26.6 % (ref 19.6–45.3)
MCH RBC QN AUTO: 32.7 PG (ref 27–32.7)
MCHC RBC AUTO-ENTMCNC: 31.5 G/DL (ref 32.6–36.4)
MCV RBC AUTO: 103.9 FL (ref 79.8–96.2)
MONOCYTES # BLD AUTO: 0.59 10*3/MM3 (ref 0.2–1.2)
MONOCYTES NFR BLD AUTO: 12.2 % (ref 5–12)
NEUTROPHILS # BLD AUTO: 2.79 10*3/MM3 (ref 1.9–8.1)
NEUTROPHILS NFR BLD AUTO: 57.9 % (ref 42.7–76)
PLATELET # BLD AUTO: 144 10*3/MM3 (ref 140–500)
POTASSIUM SERPL-SCNC: 5.2 MMOL/L (ref 3.5–5.2)
PROT SERPL-MCNC: 7 G/DL (ref 6–8.5)
RBC # BLD AUTO: 3.85 10*6/MM3 (ref 4.6–6)
SODIUM SERPL-SCNC: 144 MMOL/L (ref 136–145)
WBC # BLD AUTO: 4.82 10*3/MM3 (ref 4.5–10.7)

## 2017-08-28 ENCOUNTER — OFFICE VISIT (OUTPATIENT)
Dept: INTERNAL MEDICINE | Facility: CLINIC | Age: 82
End: 2017-08-28

## 2017-08-28 VITALS
WEIGHT: 225 LBS | HEIGHT: 70 IN | DIASTOLIC BLOOD PRESSURE: 80 MMHG | BODY MASS INDEX: 32.21 KG/M2 | OXYGEN SATURATION: 96 % | HEART RATE: 71 BPM | TEMPERATURE: 98.1 F | SYSTOLIC BLOOD PRESSURE: 130 MMHG

## 2017-08-28 DIAGNOSIS — E11.29 CONTROLLED TYPE 2 DIABETES MELLITUS WITH MICROALBUMINURIA, WITHOUT LONG-TERM CURRENT USE OF INSULIN (HCC): Primary | ICD-10-CM

## 2017-08-28 DIAGNOSIS — R41.3 MEMORY LOSS: ICD-10-CM

## 2017-08-28 DIAGNOSIS — I63.9 STROKE, ACUTE, EMBOLIC (HCC): ICD-10-CM

## 2017-08-28 DIAGNOSIS — R40.0 DAYTIME SOMNOLENCE: ICD-10-CM

## 2017-08-28 DIAGNOSIS — I10 ESSENTIAL HYPERTENSION: ICD-10-CM

## 2017-08-28 DIAGNOSIS — G47.33 OBSTRUCTIVE SLEEP APNEA SYNDROME: ICD-10-CM

## 2017-08-28 DIAGNOSIS — I48.19 PERSISTENT ATRIAL FIBRILLATION (HCC): ICD-10-CM

## 2017-08-28 DIAGNOSIS — Z79.01 WARFARIN ANTICOAGULATION: ICD-10-CM

## 2017-08-28 DIAGNOSIS — R80.9 CONTROLLED TYPE 2 DIABETES MELLITUS WITH MICROALBUMINURIA, WITHOUT LONG-TERM CURRENT USE OF INSULIN (HCC): Primary | ICD-10-CM

## 2017-08-28 PROBLEM — C43.9 MELANOMA: Status: ACTIVE | Noted: 2017-08-28

## 2017-08-28 PROBLEM — R82.90 ABNORMAL URINALYSIS: Status: RESOLVED | Noted: 2017-05-18 | Resolved: 2017-08-28

## 2017-08-28 PROCEDURE — 99214 OFFICE O/P EST MOD 30 MIN: CPT | Performed by: INTERNAL MEDICINE

## 2017-08-29 ENCOUNTER — HOSPITAL ENCOUNTER (EMERGENCY)
Facility: HOSPITAL | Age: 82
Discharge: HOME | End: 2017-08-29
Attending: EMERGENCY MEDICINE | Admitting: INTERNAL MEDICINE

## 2017-08-29 ENCOUNTER — APPOINTMENT (OUTPATIENT)
Dept: GENERAL RADIOLOGY | Facility: HOSPITAL | Age: 82
End: 2017-08-29

## 2017-08-29 ENCOUNTER — ANESTHESIA EVENT (OUTPATIENT)
Dept: GASTROENTEROLOGY | Facility: HOSPITAL | Age: 82
End: 2017-08-29

## 2017-08-29 ENCOUNTER — ANESTHESIA (OUTPATIENT)
Dept: GASTROENTEROLOGY | Facility: HOSPITAL | Age: 82
End: 2017-08-29

## 2017-08-29 ENCOUNTER — PREP FOR SURGERY (OUTPATIENT)
Dept: OTHER | Facility: HOSPITAL | Age: 82
End: 2017-08-29

## 2017-08-29 VITALS
WEIGHT: 220 LBS | SYSTOLIC BLOOD PRESSURE: 127 MMHG | BODY MASS INDEX: 31.5 KG/M2 | HEIGHT: 70 IN | DIASTOLIC BLOOD PRESSURE: 82 MMHG | RESPIRATION RATE: 16 BRPM | OXYGEN SATURATION: 97 % | TEMPERATURE: 97.6 F | HEART RATE: 60 BPM

## 2017-08-29 DIAGNOSIS — T18.108A ESOPHAGEAL FOREIGN BODY, INITIAL ENCOUNTER: Primary | ICD-10-CM

## 2017-08-29 LAB
ALBUMIN SERPL-MCNC: 4.5 G/DL (ref 3.5–5.2)
ALBUMIN/GLOB SERPL: 1.5 G/DL
ALP SERPL-CCNC: 86 U/L (ref 39–117)
ALT SERPL W P-5'-P-CCNC: 15 U/L (ref 1–41)
ANION GAP SERPL CALCULATED.3IONS-SCNC: 12.6 MMOL/L
AST SERPL-CCNC: 18 U/L (ref 1–40)
BASOPHILS # BLD AUTO: 0.01 10*3/MM3 (ref 0–0.2)
BASOPHILS NFR BLD AUTO: 0.2 % (ref 0–1.5)
BILIRUB SERPL-MCNC: 0.9 MG/DL (ref 0.1–1.2)
BUN BLD-MCNC: 15 MG/DL (ref 8–23)
BUN/CREAT SERPL: 13.2 (ref 7–25)
CALCIUM SPEC-SCNC: 9.8 MG/DL (ref 8.6–10.5)
CHLORIDE SERPL-SCNC: 102 MMOL/L (ref 98–107)
CO2 SERPL-SCNC: 30.4 MMOL/L (ref 22–29)
CREAT BLD-MCNC: 1.14 MG/DL (ref 0.76–1.27)
DEPRECATED RDW RBC AUTO: 50.8 FL (ref 37–54)
EOSINOPHIL # BLD AUTO: 0.16 10*3/MM3 (ref 0–0.7)
EOSINOPHIL NFR BLD AUTO: 3.2 % (ref 0.3–6.2)
ERYTHROCYTE [DISTWIDTH] IN BLOOD BY AUTOMATED COUNT: 13.6 % (ref 11.5–14.5)
GFR SERPL CREATININE-BSD FRML MDRD: 61 ML/MIN/1.73
GLOBULIN UR ELPH-MCNC: 3 GM/DL
GLUCOSE BLD-MCNC: 104 MG/DL (ref 65–99)
HCT VFR BLD AUTO: 40.7 % (ref 40.4–52.2)
HGB BLD-MCNC: 13.1 G/DL (ref 13.7–17.6)
IMM GRANULOCYTES # BLD: 0 10*3/MM3 (ref 0–0.03)
IMM GRANULOCYTES NFR BLD: 0 % (ref 0–0.5)
INR PPP: 1.8 (ref 0.9–1.1)
LYMPHOCYTES # BLD AUTO: 1.4 10*3/MM3 (ref 0.9–4.8)
LYMPHOCYTES NFR BLD AUTO: 28.2 % (ref 19.6–45.3)
MCH RBC QN AUTO: 33.2 PG (ref 27–32.7)
MCHC RBC AUTO-ENTMCNC: 32.2 G/DL (ref 32.6–36.4)
MCV RBC AUTO: 103.3 FL (ref 79.8–96.2)
MONOCYTES # BLD AUTO: 0.54 10*3/MM3 (ref 0.2–1.2)
MONOCYTES NFR BLD AUTO: 10.9 % (ref 5–12)
NEUTROPHILS # BLD AUTO: 2.86 10*3/MM3 (ref 1.9–8.1)
NEUTROPHILS NFR BLD AUTO: 57.5 % (ref 42.7–76)
PLATELET # BLD AUTO: 133 10*3/MM3 (ref 140–500)
PMV BLD AUTO: 11 FL (ref 6–12)
POTASSIUM BLD-SCNC: 5 MMOL/L (ref 3.5–5.2)
PROT SERPL-MCNC: 7.5 G/DL (ref 6–8.5)
PROTHROMBIN TIME: 20.3 SECONDS (ref 11.7–14.2)
RBC # BLD AUTO: 3.94 10*6/MM3 (ref 4.6–6)
SODIUM BLD-SCNC: 145 MMOL/L (ref 136–145)
WBC NRBC COR # BLD: 4.97 10*3/MM3 (ref 4.5–10.7)

## 2017-08-29 PROCEDURE — 71020 HC CHEST PA AND LATERAL: CPT

## 2017-08-29 PROCEDURE — 74000 HC ABDOMEN KUB: CPT

## 2017-08-29 PROCEDURE — 25010000002 PROPOFOL 10 MG/ML EMULSION: Performed by: NURSE ANESTHETIST, CERTIFIED REGISTERED

## 2017-08-29 PROCEDURE — S0260 H&P FOR SURGERY: HCPCS | Performed by: INTERNAL MEDICINE

## 2017-08-29 PROCEDURE — 99284 EMERGENCY DEPT VISIT MOD MDM: CPT

## 2017-08-29 PROCEDURE — 70360 X-RAY EXAM OF NECK: CPT

## 2017-08-29 PROCEDURE — 85610 PROTHROMBIN TIME: CPT | Performed by: PHYSICIAN ASSISTANT

## 2017-08-29 PROCEDURE — 43247 EGD REMOVE FOREIGN BODY: CPT | Performed by: INTERNAL MEDICINE

## 2017-08-29 PROCEDURE — 74220 X-RAY XM ESOPHAGUS 1CNTRST: CPT

## 2017-08-29 PROCEDURE — 25010000002 PROPOFOL 1000 MG/ML EMULSION: Performed by: NURSE ANESTHETIST, CERTIFIED REGISTERED

## 2017-08-29 PROCEDURE — 85025 COMPLETE CBC W/AUTO DIFF WBC: CPT | Performed by: PHYSICIAN ASSISTANT

## 2017-08-29 PROCEDURE — 80053 COMPREHEN METABOLIC PANEL: CPT | Performed by: PHYSICIAN ASSISTANT

## 2017-08-29 PROCEDURE — 25010000002 SUCCINYLCHOLINE PER 20 MG: Performed by: NURSE ANESTHETIST, CERTIFIED REGISTERED

## 2017-08-29 RX ORDER — HYDRALAZINE HYDROCHLORIDE 20 MG/ML
5 INJECTION INTRAMUSCULAR; INTRAVENOUS
Status: DISCONTINUED | OUTPATIENT
Start: 2017-08-29 | End: 2017-08-29 | Stop reason: HOSPADM

## 2017-08-29 RX ORDER — ONDANSETRON 2 MG/ML
4 INJECTION INTRAMUSCULAR; INTRAVENOUS ONCE AS NEEDED
Status: DISCONTINUED | OUTPATIENT
Start: 2017-08-29 | End: 2017-08-29 | Stop reason: HOSPADM

## 2017-08-29 RX ORDER — LABETALOL HYDROCHLORIDE 5 MG/ML
5 INJECTION, SOLUTION INTRAVENOUS
Status: DISCONTINUED | OUTPATIENT
Start: 2017-08-29 | End: 2017-08-29 | Stop reason: HOSPADM

## 2017-08-29 RX ORDER — SUCCINYLCHOLINE CHLORIDE 20 MG/ML
INJECTION INTRAMUSCULAR; INTRAVENOUS AS NEEDED
Status: DISCONTINUED | OUTPATIENT
Start: 2017-08-29 | End: 2017-08-29 | Stop reason: SURG

## 2017-08-29 RX ORDER — FENTANYL CITRATE 50 UG/ML
25 INJECTION, SOLUTION INTRAMUSCULAR; INTRAVENOUS
Status: DISCONTINUED | OUTPATIENT
Start: 2017-08-29 | End: 2017-08-29 | Stop reason: HOSPADM

## 2017-08-29 RX ORDER — SODIUM CHLORIDE, SODIUM LACTATE, POTASSIUM CHLORIDE, CALCIUM CHLORIDE 600; 310; 30; 20 MG/100ML; MG/100ML; MG/100ML; MG/100ML
1000 INJECTION, SOLUTION INTRAVENOUS CONTINUOUS PRN
Status: DISCONTINUED | OUTPATIENT
Start: 2017-08-29 | End: 2017-08-29 | Stop reason: HOSPADM

## 2017-08-29 RX ORDER — DIPHENHYDRAMINE HYDROCHLORIDE 50 MG/ML
6.25 INJECTION INTRAMUSCULAR; INTRAVENOUS
Status: DISCONTINUED | OUTPATIENT
Start: 2017-08-29 | End: 2017-08-29 | Stop reason: HOSPADM

## 2017-08-29 RX ORDER — SODIUM CHLORIDE, SODIUM LACTATE, POTASSIUM CHLORIDE, CALCIUM CHLORIDE 600; 310; 30; 20 MG/100ML; MG/100ML; MG/100ML; MG/100ML
30 INJECTION, SOLUTION INTRAVENOUS CONTINUOUS
Status: CANCELLED | OUTPATIENT
Start: 2017-08-29

## 2017-08-29 RX ORDER — EPHEDRINE SULFATE 50 MG/ML
5 INJECTION, SOLUTION INTRAVENOUS ONCE AS NEEDED
Status: DISCONTINUED | OUTPATIENT
Start: 2017-08-29 | End: 2017-08-29 | Stop reason: HOSPADM

## 2017-08-29 RX ORDER — PROPOFOL 10 MG/ML
VIAL (ML) INTRAVENOUS AS NEEDED
Status: DISCONTINUED | OUTPATIENT
Start: 2017-08-29 | End: 2017-08-29 | Stop reason: SURG

## 2017-08-29 RX ORDER — SODIUM CHLORIDE 0.9 % (FLUSH) 0.9 %
1-10 SYRINGE (ML) INJECTION AS NEEDED
Status: CANCELLED | OUTPATIENT
Start: 2017-08-29

## 2017-08-29 RX ORDER — NALOXONE HCL 0.4 MG/ML
0.4 VIAL (ML) INJECTION AS NEEDED
Status: DISCONTINUED | OUTPATIENT
Start: 2017-08-29 | End: 2017-08-29 | Stop reason: HOSPADM

## 2017-08-29 RX ADMIN — PROPOFOL 100 MG: 10 INJECTION, EMULSION INTRAVENOUS at 18:58

## 2017-08-29 RX ADMIN — SUCCINYLCHOLINE CHLORIDE 10 MG: 20 INJECTION, SOLUTION INTRAMUSCULAR; INTRAVENOUS at 19:05

## 2017-08-29 RX ADMIN — SODIUM CHLORIDE, POTASSIUM CHLORIDE, SODIUM LACTATE AND CALCIUM CHLORIDE: 600; 310; 30; 20 INJECTION, SOLUTION INTRAVENOUS at 18:55

## 2017-08-29 RX ADMIN — SUCCINYLCHOLINE CHLORIDE 10 MG: 20 INJECTION, SOLUTION INTRAMUSCULAR; INTRAVENOUS at 19:11

## 2017-08-29 RX ADMIN — SUCCINYLCHOLINE CHLORIDE 120 MG: 20 INJECTION, SOLUTION INTRAMUSCULAR; INTRAVENOUS at 18:58

## 2017-08-29 RX ADMIN — PROPOFOL 200 MCG/KG/MIN: 10 INJECTION, EMULSION INTRAVENOUS at 18:58

## 2017-08-31 ENCOUNTER — TELEPHONE (OUTPATIENT)
Dept: GASTROENTEROLOGY | Facility: CLINIC | Age: 82
End: 2017-08-31

## 2017-08-31 ENCOUNTER — HOSPITAL ENCOUNTER (OUTPATIENT)
Facility: HOSPITAL | Age: 82
Setting detail: HOSPITAL OUTPATIENT SURGERY
End: 2017-08-31
Attending: INTERNAL MEDICINE | Admitting: INTERNAL MEDICINE

## 2017-09-05 ENCOUNTER — TELEPHONE (OUTPATIENT)
Dept: CARDIOLOGY | Facility: CLINIC | Age: 82
End: 2017-09-05

## 2017-09-05 RX ORDER — WARFARIN SODIUM 5 MG/1
TABLET ORAL
Qty: 30 TABLET | Refills: 0 | Status: SHIPPED | OUTPATIENT
Start: 2017-09-05 | End: 2018-07-13 | Stop reason: SDUPTHER

## 2017-09-05 RX ORDER — WARFARIN SODIUM 5 MG/1
TABLET ORAL
Qty: 90 TABLET | Refills: 1 | Status: SHIPPED | OUTPATIENT
Start: 2017-09-05 | End: 2018-07-13 | Stop reason: SDUPTHER

## 2017-09-13 ENCOUNTER — HOSPITAL ENCOUNTER (OUTPATIENT)
Dept: CARDIOLOGY | Facility: HOSPITAL | Age: 82
Setting detail: RECURRING SERIES
Discharge: HOME | End: 2017-09-13

## 2017-09-13 PROCEDURE — 36416 COLLJ CAPILLARY BLOOD SPEC: CPT

## 2017-09-13 PROCEDURE — 85610 PROTHROMBIN TIME: CPT

## 2017-09-26 ENCOUNTER — HOSPITAL ENCOUNTER (OUTPATIENT)
Dept: CARDIOLOGY | Facility: HOSPITAL | Age: 82
Setting detail: RECURRING SERIES
Discharge: HOME | End: 2017-09-26

## 2017-09-26 PROCEDURE — 85610 PROTHROMBIN TIME: CPT

## 2017-09-26 PROCEDURE — 36416 COLLJ CAPILLARY BLOOD SPEC: CPT

## 2017-09-29 RX ORDER — CARVEDILOL 25 MG/1
25 TABLET ORAL 2 TIMES DAILY WITH MEALS
Qty: 180 TABLET | Refills: 2 | Status: SHIPPED | OUTPATIENT
Start: 2017-09-29 | End: 2018-04-26 | Stop reason: SDUPTHER

## 2017-10-02 RX ORDER — DIGOXIN 125 MCG
TABLET ORAL
Qty: 90 TABLET | Refills: 0 | Status: SHIPPED | OUTPATIENT
Start: 2017-10-02 | End: 2017-12-20 | Stop reason: SDUPTHER

## 2017-10-04 RX ORDER — OMEPRAZOLE 40 MG/1
CAPSULE, DELAYED RELEASE ORAL
Qty: 90 CAPSULE | Refills: 3 | Status: SHIPPED | OUTPATIENT
Start: 2017-10-04 | End: 2018-07-13

## 2017-10-05 RX ORDER — FOLIC ACID 1 MG/1
TABLET ORAL
Qty: 90 TABLET | Refills: 3 | Status: SHIPPED | OUTPATIENT
Start: 2017-10-05 | End: 2018-02-14 | Stop reason: SDUPTHER

## 2017-10-05 RX ORDER — POTASSIUM CHLORIDE 750 MG/1
TABLET, EXTENDED RELEASE ORAL
Qty: 90 TABLET | Refills: 3 | Status: SHIPPED | OUTPATIENT
Start: 2017-10-05 | End: 2018-04-02

## 2017-10-25 ENCOUNTER — TELEPHONE (OUTPATIENT)
Dept: INTERNAL MEDICINE | Facility: CLINIC | Age: 82
End: 2017-10-25

## 2017-10-26 RX ORDER — FUROSEMIDE 20 MG/1
TABLET ORAL
Qty: 45 TABLET | Refills: 3 | Status: SHIPPED | OUTPATIENT
Start: 2017-10-26 | End: 2018-05-18 | Stop reason: SDUPTHER

## 2017-11-01 ENCOUNTER — HOSPITAL ENCOUNTER (OUTPATIENT)
Dept: CARDIOLOGY | Facility: HOSPITAL | Age: 82
Setting detail: RECURRING SERIES
Discharge: HOME | End: 2017-11-01

## 2017-11-01 PROCEDURE — 85610 PROTHROMBIN TIME: CPT

## 2017-11-01 PROCEDURE — 36416 COLLJ CAPILLARY BLOOD SPEC: CPT

## 2017-11-09 ENCOUNTER — HOSPITAL ENCOUNTER (OUTPATIENT)
Dept: CARDIOLOGY | Facility: HOSPITAL | Age: 82
Setting detail: RECURRING SERIES
Discharge: HOME | End: 2017-11-09

## 2017-11-09 PROCEDURE — 85610 PROTHROMBIN TIME: CPT

## 2017-11-09 PROCEDURE — 36416 COLLJ CAPILLARY BLOOD SPEC: CPT

## 2017-11-16 DIAGNOSIS — E11.29 CONTROLLED TYPE 2 DIABETES MELLITUS WITH MICROALBUMINURIA, WITHOUT LONG-TERM CURRENT USE OF INSULIN (HCC): ICD-10-CM

## 2017-11-16 DIAGNOSIS — M1A.9XX0 CHRONIC GOUT WITHOUT TOPHUS, UNSPECIFIED CAUSE, UNSPECIFIED SITE: ICD-10-CM

## 2017-11-16 DIAGNOSIS — N40.1 BENIGN NON-NODULAR PROSTATIC HYPERPLASIA WITH LOWER URINARY TRACT SYMPTOMS: ICD-10-CM

## 2017-11-16 DIAGNOSIS — R80.9 CONTROLLED TYPE 2 DIABETES MELLITUS WITH MICROALBUMINURIA, WITHOUT LONG-TERM CURRENT USE OF INSULIN (HCC): ICD-10-CM

## 2017-11-16 DIAGNOSIS — R80.9 MICROALBUMINURIA: ICD-10-CM

## 2017-11-16 DIAGNOSIS — Z12.5 SCREENING FOR PROSTATE CANCER: ICD-10-CM

## 2017-11-16 DIAGNOSIS — E55.9 VITAMIN D DEFICIENCY: ICD-10-CM

## 2017-11-16 DIAGNOSIS — I10 ESSENTIAL HYPERTENSION: ICD-10-CM

## 2017-11-16 DIAGNOSIS — Z00.00 HEALTHCARE MAINTENANCE: Primary | ICD-10-CM

## 2017-11-16 DIAGNOSIS — G40.909 SEIZURE DISORDER (HCC): ICD-10-CM

## 2017-11-17 ENCOUNTER — HOSPITAL ENCOUNTER (OUTPATIENT)
Dept: CARDIOLOGY | Facility: HOSPITAL | Age: 82
Setting detail: RECURRING SERIES
Discharge: HOME | End: 2017-11-17

## 2017-11-17 PROCEDURE — 85610 PROTHROMBIN TIME: CPT

## 2017-11-17 PROCEDURE — 36416 COLLJ CAPILLARY BLOOD SPEC: CPT

## 2017-11-21 ENCOUNTER — TELEPHONE (OUTPATIENT)
Dept: INTERNAL MEDICINE | Facility: CLINIC | Age: 82
End: 2017-11-21

## 2017-11-25 LAB
25(OH)D3+25(OH)D2 SERPL-MCNC: 34.5 NG/ML (ref 30–100)
ALBUMIN SERPL-MCNC: 4.5 G/DL (ref 3.5–5.2)
ALBUMIN/CREAT UR: 28.6 MG/G CREAT (ref 0–30)
ALBUMIN/GLOB SERPL: 1.9 G/DL
ALP SERPL-CCNC: 131 U/L (ref 39–117)
ALT SERPL-CCNC: 28 U/L (ref 1–41)
APPEARANCE UR: CLEAR
AST SERPL-CCNC: 19 U/L (ref 1–40)
BACTERIA #/AREA URNS HPF: ABNORMAL /HPF
BACTERIA UR CULT: NORMAL
BACTERIA UR CULT: NORMAL
BASOPHILS # BLD AUTO: 0.01 10*3/MM3 (ref 0–0.2)
BASOPHILS NFR BLD AUTO: 0.2 % (ref 0–1.5)
BILIRUB SERPL-MCNC: 0.4 MG/DL (ref 0.1–1.2)
BILIRUB UR QL STRIP: NEGATIVE
BUN SERPL-MCNC: 18 MG/DL (ref 8–23)
BUN/CREAT SERPL: 16.2 (ref 7–25)
CALCIUM SERPL-MCNC: 9.7 MG/DL (ref 8.6–10.5)
CASTS URNS MICRO: ABNORMAL
CASTS URNS QL MICRO: PRESENT /LPF
CHLORIDE SERPL-SCNC: 104 MMOL/L (ref 98–107)
CHOLEST SERPL-MCNC: 90 MG/DL (ref 0–200)
CO2 SERPL-SCNC: 26.9 MMOL/L (ref 22–29)
COLOR UR: YELLOW
CREAT SERPL-MCNC: 1.11 MG/DL (ref 0.76–1.27)
CREAT UR-MCNC: 148.5 MG/DL
CRYSTALS URNS MICRO: ABNORMAL
EOSINOPHIL # BLD AUTO: 0.11 10*3/MM3 (ref 0–0.7)
EOSINOPHIL NFR BLD AUTO: 1.8 % (ref 0.3–6.2)
EPI CELLS #/AREA URNS HPF: ABNORMAL /HPF
ERYTHROCYTE [DISTWIDTH] IN BLOOD BY AUTOMATED COUNT: 13.5 % (ref 11.5–14.5)
GFR SERPLBLD CREATININE-BSD FMLA CKD-EPI: 63 ML/MIN/1.73
GFR SERPLBLD CREATININE-BSD FMLA CKD-EPI: 76 ML/MIN/1.73
GLOBULIN SER CALC-MCNC: 2.4 GM/DL
GLUCOSE SERPL-MCNC: 133 MG/DL (ref 65–99)
GLUCOSE UR QL: NEGATIVE
HBA1C MFR BLD: 6.9 % (ref 4.8–5.6)
HCT VFR BLD AUTO: 41.2 % (ref 40.4–52.2)
HDLC SERPL-MCNC: 30 MG/DL (ref 40–60)
HGB BLD-MCNC: 13.1 G/DL (ref 13.7–17.6)
HGB UR QL STRIP: ABNORMAL
IMM GRANULOCYTES # BLD: 0.03 10*3/MM3 (ref 0–0.03)
IMM GRANULOCYTES NFR BLD: 0.5 % (ref 0–0.5)
KETONES UR QL STRIP: NEGATIVE
LDLC SERPL CALC-MCNC: 33 MG/DL (ref 0–100)
LEUKOCYTE ESTERASE UR QL STRIP: ABNORMAL
LYMPHOCYTES # BLD AUTO: 1.01 10*3/MM3 (ref 0.9–4.8)
LYMPHOCYTES NFR BLD AUTO: 16.6 % (ref 19.6–45.3)
MCH RBC QN AUTO: 32.8 PG (ref 27–32.7)
MCHC RBC AUTO-ENTMCNC: 31.8 G/DL (ref 32.6–36.4)
MCV RBC AUTO: 103.3 FL (ref 79.8–96.2)
MICRO URNS: ABNORMAL
MICROALBUMIN UR-MCNC: 42.5 UG/ML
MONOCYTES # BLD AUTO: 0.58 10*3/MM3 (ref 0.2–1.2)
MONOCYTES NFR BLD AUTO: 9.6 % (ref 5–12)
MUCOUS THREADS URNS QL MICRO: PRESENT /HPF
NEUTROPHILS # BLD AUTO: 4.33 10*3/MM3 (ref 1.9–8.1)
NEUTROPHILS NFR BLD AUTO: 71.3 % (ref 42.7–76)
NITRITE UR QL STRIP: POSITIVE
PH UR STRIP: 5 [PH] (ref 5–7.5)
PLATELET # BLD AUTO: 146 10*3/MM3 (ref 140–500)
POTASSIUM SERPL-SCNC: 5 MMOL/L (ref 3.5–5.2)
PROT SERPL-MCNC: 6.9 G/DL (ref 6–8.5)
PROT UR QL STRIP: NEGATIVE
RBC # BLD AUTO: 3.99 10*6/MM3 (ref 4.6–6)
RBC #/AREA URNS HPF: ABNORMAL /HPF
SODIUM SERPL-SCNC: 145 MMOL/L (ref 136–145)
SP GR UR: 1.02 (ref 1–1.03)
TRIGL SERPL-MCNC: 135 MG/DL (ref 0–150)
UNIDENT CRYS URNS QL MICRO: PRESENT /LPF
URATE SERPL-MCNC: 8.1 MG/DL (ref 3.4–7)
URINALYSIS REFLEX: ABNORMAL
UROBILINOGEN UR STRIP-MCNC: 0.2 MG/DL (ref 0.2–1)
VLDLC SERPL CALC-MCNC: 27 MG/DL (ref 5–40)
WBC # BLD AUTO: 6.07 10*3/MM3 (ref 4.5–10.7)
WBC #/AREA URNS HPF: >30 /HPF

## 2017-12-01 ENCOUNTER — OFFICE VISIT (OUTPATIENT)
Dept: INTERNAL MEDICINE | Facility: CLINIC | Age: 82
End: 2017-12-01

## 2017-12-01 VITALS
HEART RATE: 83 BPM | OXYGEN SATURATION: 94 % | HEIGHT: 70 IN | TEMPERATURE: 97.9 F | RESPIRATION RATE: 12 BRPM | BODY MASS INDEX: 32.07 KG/M2 | WEIGHT: 224 LBS

## 2017-12-01 DIAGNOSIS — R74.8 ELEVATED ALKALINE PHOSPHATASE LEVEL: ICD-10-CM

## 2017-12-01 DIAGNOSIS — I48.19 PERSISTENT ATRIAL FIBRILLATION (HCC): ICD-10-CM

## 2017-12-01 DIAGNOSIS — K21.9 GASTROESOPHAGEAL REFLUX DISEASE, ESOPHAGITIS PRESENCE NOT SPECIFIED: ICD-10-CM

## 2017-12-01 DIAGNOSIS — Z79.01 WARFARIN ANTICOAGULATION: ICD-10-CM

## 2017-12-01 DIAGNOSIS — E11.29 CONTROLLED TYPE 2 DIABETES MELLITUS WITH MICROALBUMINURIA, WITHOUT LONG-TERM CURRENT USE OF INSULIN (HCC): ICD-10-CM

## 2017-12-01 DIAGNOSIS — D12.6 TUBULAR ADENOMA OF COLON: ICD-10-CM

## 2017-12-01 DIAGNOSIS — I99.9 VASCULAR DISORDER: ICD-10-CM

## 2017-12-01 DIAGNOSIS — I10 ESSENTIAL HYPERTENSION: ICD-10-CM

## 2017-12-01 DIAGNOSIS — E55.9 VITAMIN D DEFICIENCY: ICD-10-CM

## 2017-12-01 DIAGNOSIS — R80.9 MICROALBUMINURIA: ICD-10-CM

## 2017-12-01 DIAGNOSIS — N40.2 PROSTATE NODULE: ICD-10-CM

## 2017-12-01 DIAGNOSIS — C43.9 MALIGNANT MELANOMA, UNSPECIFIED SITE (HCC): ICD-10-CM

## 2017-12-01 DIAGNOSIS — G40.909 SEIZURE DISORDER (HCC): ICD-10-CM

## 2017-12-01 DIAGNOSIS — G47.33 OBSTRUCTIVE SLEEP APNEA SYNDROME: ICD-10-CM

## 2017-12-01 DIAGNOSIS — Z00.00 HEALTHCARE MAINTENANCE: Primary | ICD-10-CM

## 2017-12-01 DIAGNOSIS — C44.320 SCC (SQUAMOUS CELL CARCINOMA), FACE: ICD-10-CM

## 2017-12-01 DIAGNOSIS — N40.1 BENIGN NON-NODULAR PROSTATIC HYPERPLASIA WITH LOWER URINARY TRACT SYMPTOMS: ICD-10-CM

## 2017-12-01 DIAGNOSIS — R80.9 CONTROLLED TYPE 2 DIABETES MELLITUS WITH MICROALBUMINURIA, WITHOUT LONG-TERM CURRENT USE OF INSULIN (HCC): ICD-10-CM

## 2017-12-01 DIAGNOSIS — I49.5 SICK SINUS SYNDROME (HCC): ICD-10-CM

## 2017-12-01 DIAGNOSIS — R41.3 MEMORY LOSS: ICD-10-CM

## 2017-12-01 DIAGNOSIS — I63.9 STROKE, ACUTE, EMBOLIC (HCC): ICD-10-CM

## 2017-12-01 PROBLEM — T18.108A ESOPHAGEAL FOREIGN BODY: Status: RESOLVED | Noted: 2017-08-29 | Resolved: 2017-12-01

## 2017-12-01 PROCEDURE — 99214 OFFICE O/P EST MOD 30 MIN: CPT | Performed by: INTERNAL MEDICINE

## 2017-12-01 PROCEDURE — G0439 PPPS, SUBSEQ VISIT: HCPCS | Performed by: INTERNAL MEDICINE

## 2017-12-05 DIAGNOSIS — R97.20 ELEVATED PSA: ICD-10-CM

## 2017-12-05 DIAGNOSIS — N40.2 PROSTATE NODULE: Primary | ICD-10-CM

## 2017-12-05 LAB
ALP BONE SERPL-MCNC: 11.7 UG/L (ref 7.6–24.4)
GGT SERPL-CCNC: 52 U/L (ref 8–61)
PSA SERPL-MCNC: 6.51 NG/ML (ref 0–4)

## 2017-12-20 DIAGNOSIS — R80.9 MICROALBUMINURIA: ICD-10-CM

## 2017-12-21 RX ORDER — LISINOPRIL 5 MG/1
TABLET ORAL
Qty: 90 TABLET | Refills: 3 | Status: SHIPPED | OUTPATIENT
Start: 2017-12-21 | End: 2018-04-26 | Stop reason: SDUPTHER

## 2017-12-26 RX ORDER — DIGOXIN 125 MCG
TABLET ORAL
Qty: 90 TABLET | Refills: 0 | Status: SHIPPED | OUTPATIENT
Start: 2017-12-26 | End: 2018-01-08

## 2017-12-27 ENCOUNTER — TELEPHONE (OUTPATIENT)
Dept: CARDIOLOGY | Facility: CLINIC | Age: 82
End: 2017-12-27

## 2017-12-27 DIAGNOSIS — I48.0 PAF (PAROXYSMAL ATRIAL FIBRILLATION) (HCC): Primary | ICD-10-CM

## 2018-01-08 ENCOUNTER — HOSPITAL ENCOUNTER (OUTPATIENT)
Dept: CARDIOLOGY | Facility: HOSPITAL | Age: 83
Setting detail: RECURRING SERIES
Discharge: HOME | End: 2018-01-08

## 2018-01-08 ENCOUNTER — OFFICE VISIT (OUTPATIENT)
Dept: CARDIOLOGY | Facility: CLINIC | Age: 83
End: 2018-01-08

## 2018-01-08 ENCOUNTER — CLINICAL SUPPORT NO REQUIREMENTS (OUTPATIENT)
Dept: CARDIOLOGY | Facility: CLINIC | Age: 83
End: 2018-01-08

## 2018-01-08 ENCOUNTER — TELEPHONE (OUTPATIENT)
Dept: CARDIOLOGY | Facility: CLINIC | Age: 83
End: 2018-01-08

## 2018-01-08 DIAGNOSIS — G47.33 OBSTRUCTIVE SLEEP APNEA SYNDROME: ICD-10-CM

## 2018-01-08 DIAGNOSIS — I42.1 HYPERTROPHIC OBSTRUCTIVE CARDIOMYOPATHY (HCC): ICD-10-CM

## 2018-01-08 DIAGNOSIS — E11.29 CONTROLLED TYPE 2 DIABETES MELLITUS WITH MICROALBUMINURIA, WITHOUT LONG-TERM CURRENT USE OF INSULIN (HCC): ICD-10-CM

## 2018-01-08 DIAGNOSIS — I71.21 ANEURYSM OF AORTIC ROOT (HCC): Primary | ICD-10-CM

## 2018-01-08 DIAGNOSIS — R80.9 CONTROLLED TYPE 2 DIABETES MELLITUS WITH MICROALBUMINURIA, WITHOUT LONG-TERM CURRENT USE OF INSULIN (HCC): ICD-10-CM

## 2018-01-08 DIAGNOSIS — I71.20 THORACIC AORTIC ANEURYSM WITHOUT RUPTURE (HCC): Primary | ICD-10-CM

## 2018-01-08 DIAGNOSIS — I48.19 PERSISTENT ATRIAL FIBRILLATION (HCC): ICD-10-CM

## 2018-01-08 DIAGNOSIS — I10 ESSENTIAL HYPERTENSION: ICD-10-CM

## 2018-01-08 DIAGNOSIS — I47.20 VENTRICULAR TACHYCARDIA (HCC): Primary | ICD-10-CM

## 2018-01-08 DIAGNOSIS — Z79.01 WARFARIN ANTICOAGULATION: ICD-10-CM

## 2018-01-08 PROCEDURE — 93283 PRGRMG EVAL IMPLANTABLE DFB: CPT | Performed by: INTERNAL MEDICINE

## 2018-01-08 PROCEDURE — 85610 PROTHROMBIN TIME: CPT

## 2018-01-08 PROCEDURE — 99214 OFFICE O/P EST MOD 30 MIN: CPT | Performed by: INTERNAL MEDICINE

## 2018-01-08 PROCEDURE — 93290 INTERROG DEV EVAL ICPMS IP: CPT | Performed by: INTERNAL MEDICINE

## 2018-01-08 PROCEDURE — 93000 ELECTROCARDIOGRAM COMPLETE: CPT | Performed by: INTERNAL MEDICINE

## 2018-01-08 PROCEDURE — 36416 COLLJ CAPILLARY BLOOD SPEC: CPT

## 2018-01-11 ENCOUNTER — HOSPITAL ENCOUNTER (OUTPATIENT)
Dept: CARDIOLOGY | Facility: HOSPITAL | Age: 83
Discharge: HOME | End: 2018-01-11
Attending: INTERNAL MEDICINE | Admitting: INTERNAL MEDICINE

## 2018-01-11 VITALS
HEART RATE: 75 BPM | SYSTOLIC BLOOD PRESSURE: 134 MMHG | BODY MASS INDEX: 32.07 KG/M2 | DIASTOLIC BLOOD PRESSURE: 68 MMHG | HEIGHT: 70 IN | WEIGHT: 224 LBS

## 2018-01-11 DIAGNOSIS — I71.20 THORACIC AORTIC ANEURYSM WITHOUT RUPTURE (HCC): ICD-10-CM

## 2018-01-11 LAB
ASCENDING AORTA: 3.8 CM
BH CV ECHO MEAS - ACS: 2.2 CM
BH CV ECHO MEAS - AI DEC SLOPE: 146.7 CM/SEC^2
BH CV ECHO MEAS - AI MAX PG: 64.8 MMHG
BH CV ECHO MEAS - AI MAX VEL: 402.6 CM/SEC
BH CV ECHO MEAS - AI P1/2T: 804.1 MSEC
BH CV ECHO MEAS - AO MAX PG (FULL): 4 MMHG
BH CV ECHO MEAS - AO MAX PG: 5.9 MMHG
BH CV ECHO MEAS - AO MEAN PG (FULL): 1.6 MMHG
BH CV ECHO MEAS - AO MEAN PG: 2.5 MMHG
BH CV ECHO MEAS - AO ROOT AREA (BSA CORRECTED): 1.8
BH CV ECHO MEAS - AO ROOT AREA: 12.4 CM^2
BH CV ECHO MEAS - AO ROOT DIAM: 4 CM
BH CV ECHO MEAS - AO V2 MAX: 122 CM/SEC
BH CV ECHO MEAS - AO V2 MEAN: 65.2 CM/SEC
BH CV ECHO MEAS - AO V2 VTI: 21.3 CM
BH CV ECHO MEAS - ASC AORTA: 3.8 CM
BH CV ECHO MEAS - AVA(I,A): 2.8 CM^2
BH CV ECHO MEAS - AVA(I,D): 2.8 CM^2
BH CV ECHO MEAS - AVA(V,A): 2.4 CM^2
BH CV ECHO MEAS - AVA(V,D): 2.4 CM^2
BH CV ECHO MEAS - BSA(HAYCOCK): 2.3 M^2
BH CV ECHO MEAS - BSA: 2.2 M^2
BH CV ECHO MEAS - BZI_BMI: 32.1 KILOGRAMS/M^2
BH CV ECHO MEAS - BZI_METRIC_HEIGHT: 177.8 CM
BH CV ECHO MEAS - BZI_METRIC_WEIGHT: 101.6 KG
BH CV ECHO MEAS - CONTRAST EF (2CH): 51.1 ML/M^2
BH CV ECHO MEAS - CONTRAST EF 4CH: 50 ML/M^2
BH CV ECHO MEAS - EDV(MOD-SP2): 94 ML
BH CV ECHO MEAS - EDV(MOD-SP4): 96 ML
BH CV ECHO MEAS - EDV(TEICH): 137.9 ML
BH CV ECHO MEAS - EF(CUBED): 72.5 %
BH CV ECHO MEAS - EF(MOD-SP2): 51.1 %
BH CV ECHO MEAS - EF(MOD-SP4): 50 %
BH CV ECHO MEAS - EF(TEICH): 63.7 %
BH CV ECHO MEAS - ESV(MOD-SP2): 46 ML
BH CV ECHO MEAS - ESV(MOD-SP4): 48 ML
BH CV ECHO MEAS - ESV(TEICH): 50 ML
BH CV ECHO MEAS - FS: 35 %
BH CV ECHO MEAS - IVS/LVPW: 1
BH CV ECHO MEAS - IVSD: 1.3 CM
BH CV ECHO MEAS - LAT PEAK E' VEL: 7 CM/SEC
BH CV ECHO MEAS - LV DIASTOLIC VOL/BSA (35-75): 43.8 ML/M^2
BH CV ECHO MEAS - LV MASS(C)D: 284.7 GRAMS
BH CV ECHO MEAS - LV MASS(C)DI: 130 GRAMS/M^2
BH CV ECHO MEAS - LV MAX PG: 2 MMHG
BH CV ECHO MEAS - LV MEAN PG: 0.93 MMHG
BH CV ECHO MEAS - LV SYSTOLIC VOL/BSA (12-30): 21.9 ML/M^2
BH CV ECHO MEAS - LV V1 MAX: 70.6 CM/SEC
BH CV ECHO MEAS - LV V1 MEAN: 42.6 CM/SEC
BH CV ECHO MEAS - LV V1 VTI: 14.4 CM
BH CV ECHO MEAS - LVIDD: 5.3 CM
BH CV ECHO MEAS - LVIDS: 3.5 CM
BH CV ECHO MEAS - LVLD AP2: 7.9 CM
BH CV ECHO MEAS - LVLD AP4: 7.3 CM
BH CV ECHO MEAS - LVLS AP2: 7.3 CM
BH CV ECHO MEAS - LVLS AP4: 6.4 CM
BH CV ECHO MEAS - LVOT AREA (M): 4.2 CM^2
BH CV ECHO MEAS - LVOT AREA: 4.1 CM^2
BH CV ECHO MEAS - LVOT DIAM: 2.3 CM
BH CV ECHO MEAS - LVPWD: 1.3 CM
BH CV ECHO MEAS - MED PEAK E' VEL: 6 CM/SEC
BH CV ECHO MEAS - MV DEC SLOPE: 481.9 CM/SEC^2
BH CV ECHO MEAS - MV DEC TIME: 0.18 SEC
BH CV ECHO MEAS - MV E MAX VEL: 88.1 CM/SEC
BH CV ECHO MEAS - MV MAX PG: 10.6 MMHG
BH CV ECHO MEAS - MV MEAN PG: 5.7 MMHG
BH CV ECHO MEAS - MV P1/2T MAX VEL: 87 CM/SEC
BH CV ECHO MEAS - MV P1/2T: 52.9 MSEC
BH CV ECHO MEAS - MV V2 MAX: 162.9 CM/SEC
BH CV ECHO MEAS - MV V2 MEAN: 112.1 CM/SEC
BH CV ECHO MEAS - MV V2 VTI: 32 CM
BH CV ECHO MEAS - MVA P1/2T LCG: 2.5 CM^2
BH CV ECHO MEAS - MVA(P1/2T): 4.2 CM^2
BH CV ECHO MEAS - MVA(VTI): 1.9 CM^2
BH CV ECHO MEAS - PA MAX PG (FULL): 4.9 MMHG
BH CV ECHO MEAS - PA MAX PG: 6.7 MMHG
BH CV ECHO MEAS - PA V2 MAX: 129 CM/SEC
BH CV ECHO MEAS - PVA(V,A): 2.6 CM^2
BH CV ECHO MEAS - PVA(V,D): 2.6 CM^2
BH CV ECHO MEAS - QP/QS: 1.1
BH CV ECHO MEAS - RAP SYSTOLE: 15 MMHG
BH CV ECHO MEAS - RV MAX PG: 1.8 MMHG
BH CV ECHO MEAS - RV MEAN PG: 0.83 MMHG
BH CV ECHO MEAS - RV V1 MAX: 66.9 CM/SEC
BH CV ECHO MEAS - RV V1 MEAN: 41.1 CM/SEC
BH CV ECHO MEAS - RV V1 VTI: 12.7 CM
BH CV ECHO MEAS - RVOT AREA: 5 CM^2
BH CV ECHO MEAS - RVOT DIAM: 2.5 CM
BH CV ECHO MEAS - RVSP: 44.8 MMHG
BH CV ECHO MEAS - SI(AO): 120.9 ML/M^2
BH CV ECHO MEAS - SI(CUBED): 50.5 ML/M^2
BH CV ECHO MEAS - SI(LVOT): 27.1 ML/M^2
BH CV ECHO MEAS - SI(MOD-SP2): 21.9 ML/M^2
BH CV ECHO MEAS - SI(MOD-SP4): 21.9 ML/M^2
BH CV ECHO MEAS - SI(TEICH): 40.1 ML/M^2
BH CV ECHO MEAS - SUP REN AO DIAM: 2.1 CM
BH CV ECHO MEAS - SV(AO): 264.8 ML
BH CV ECHO MEAS - SV(CUBED): 110.5 ML
BH CV ECHO MEAS - SV(LVOT): 59.4 ML
BH CV ECHO MEAS - SV(MOD-SP2): 48 ML
BH CV ECHO MEAS - SV(MOD-SP4): 48 ML
BH CV ECHO MEAS - SV(RVOT): 63.9 ML
BH CV ECHO MEAS - SV(TEICH): 87.9 ML
BH CV ECHO MEAS - TAPSE (>1.6): 2.2 CM2
BH CV ECHO MEAS - TR MAX VEL: 272.8 CM/SEC
BH CV XLRA - RV BASE: 3.7 CM
BH CV XLRA - TDI S': 10 CM/SEC
E/E' RATIO: 14
LEFT ATRIUM VOLUME INDEX: 44 ML/M2
LV EF 2D ECHO EST: 50 %
SINUS: 4 CM
STJ: 3.3 CM

## 2018-01-11 PROCEDURE — 93306 TTE W/DOPPLER COMPLETE: CPT

## 2018-01-11 PROCEDURE — 93306 TTE W/DOPPLER COMPLETE: CPT | Performed by: INTERNAL MEDICINE

## 2018-01-28 ENCOUNTER — TELEPHONE (OUTPATIENT)
Dept: CARDIOLOGY | Facility: CLINIC | Age: 83
End: 2018-01-28

## 2018-02-06 ENCOUNTER — TRANSCRIBE ORDERS (OUTPATIENT)
Dept: ADMINISTRATIVE | Facility: HOSPITAL | Age: 83
End: 2018-02-06

## 2018-02-06 ENCOUNTER — TELEPHONE (OUTPATIENT)
Dept: INTERNAL MEDICINE | Facility: CLINIC | Age: 83
End: 2018-02-06

## 2018-02-06 DIAGNOSIS — R97.20 ELEVATED PSA: Primary | ICD-10-CM

## 2018-02-07 ENCOUNTER — OFFICE VISIT (OUTPATIENT)
Dept: INTERNAL MEDICINE | Facility: CLINIC | Age: 83
End: 2018-02-07

## 2018-02-07 VITALS
HEART RATE: 87 BPM | HEIGHT: 70 IN | SYSTOLIC BLOOD PRESSURE: 120 MMHG | DIASTOLIC BLOOD PRESSURE: 70 MMHG | TEMPERATURE: 97.8 F | OXYGEN SATURATION: 98 % | BODY MASS INDEX: 30.06 KG/M2 | WEIGHT: 210 LBS

## 2018-02-07 DIAGNOSIS — R33.8 ACUTE URINARY RETENTION: Primary | ICD-10-CM

## 2018-02-07 DIAGNOSIS — N40.1 BENIGN NON-NODULAR PROSTATIC HYPERPLASIA WITH LOWER URINARY TRACT SYMPTOMS: ICD-10-CM

## 2018-02-07 DIAGNOSIS — C61 PROSTATE CANCER (HCC): ICD-10-CM

## 2018-02-07 LAB
BILIRUB BLD-MCNC: NEGATIVE MG/DL
CLARITY, POC: CLEAR
COLOR UR: YELLOW
GLUCOSE UR STRIP-MCNC: NEGATIVE MG/DL
KETONES UR QL: NEGATIVE
LEUKOCYTE EST, POC: ABNORMAL
NITRITE UR-MCNC: POSITIVE MG/ML
PH UR: 5 [PH] (ref 5–8)
PROT UR STRIP-MCNC: ABNORMAL MG/DL
RBC # UR STRIP: ABNORMAL /UL
SP GR UR: 1.03 (ref 1–1.03)
UROBILINOGEN UR QL: NORMAL

## 2018-02-07 PROCEDURE — 81003 URINALYSIS AUTO W/O SCOPE: CPT | Performed by: INTERNAL MEDICINE

## 2018-02-07 PROCEDURE — 99214 OFFICE O/P EST MOD 30 MIN: CPT | Performed by: INTERNAL MEDICINE

## 2018-02-13 ENCOUNTER — HOSPITAL ENCOUNTER (OUTPATIENT)
Dept: NUCLEAR MEDICINE | Facility: HOSPITAL | Age: 83
Discharge: HOME | End: 2018-02-13
Attending: UROLOGY

## 2018-02-13 ENCOUNTER — HOSPITAL ENCOUNTER (OUTPATIENT)
Dept: CT IMAGING | Facility: HOSPITAL | Age: 83
Discharge: HOME | End: 2018-02-13
Attending: UROLOGY | Admitting: UROLOGY

## 2018-02-13 DIAGNOSIS — R97.20 ELEVATED PSA: ICD-10-CM

## 2018-02-13 PROCEDURE — 0 TECHNETIUM MEDRONATE KIT: Performed by: UROLOGY

## 2018-02-13 PROCEDURE — A9503 TC99M MEDRONATE: HCPCS | Performed by: UROLOGY

## 2018-02-13 PROCEDURE — 74176 CT ABD & PELVIS W/O CONTRAST: CPT

## 2018-02-13 PROCEDURE — 78306 BONE IMAGING WHOLE BODY: CPT

## 2018-02-13 RX ORDER — TC 99M MEDRONATE 20 MG/10ML
18.4 INJECTION, POWDER, LYOPHILIZED, FOR SOLUTION INTRAVENOUS
Status: COMPLETED | OUTPATIENT
Start: 2018-02-13 | End: 2018-02-13

## 2018-02-13 RX ADMIN — Medication 18.4 MILLICURIE: at 11:30

## 2018-02-14 RX ORDER — FOLIC ACID 1 MG/1
TABLET ORAL
Qty: 90 TABLET | Refills: 3 | Status: SHIPPED | OUTPATIENT
Start: 2018-02-14 | End: 2018-10-12

## 2018-02-17 ENCOUNTER — APPOINTMENT (OUTPATIENT)
Dept: CT IMAGING | Facility: HOSPITAL | Age: 83
End: 2018-02-17

## 2018-02-17 ENCOUNTER — HOSPITAL ENCOUNTER (EMERGENCY)
Facility: HOSPITAL | Age: 83
Discharge: HOME | End: 2018-02-17
Attending: EMERGENCY MEDICINE | Admitting: EMERGENCY MEDICINE

## 2018-02-17 VITALS
OXYGEN SATURATION: 94 % | RESPIRATION RATE: 15 BRPM | BODY MASS INDEX: 28.7 KG/M2 | WEIGHT: 205 LBS | HEART RATE: 62 BPM | TEMPERATURE: 98.7 F | DIASTOLIC BLOOD PRESSURE: 70 MMHG | SYSTOLIC BLOOD PRESSURE: 119 MMHG | HEIGHT: 71 IN

## 2018-02-17 DIAGNOSIS — R41.0 ACUTE CONFUSION: Primary | ICD-10-CM

## 2018-02-17 LAB
ALBUMIN SERPL-MCNC: 4.2 G/DL (ref 3.5–5.2)
ALBUMIN/GLOB SERPL: 1.8 G/DL
ALP SERPL-CCNC: 86 U/L (ref 39–117)
ALT SERPL W P-5'-P-CCNC: 53 U/L (ref 1–41)
ANION GAP SERPL CALCULATED.3IONS-SCNC: 11.2 MMOL/L
AST SERPL-CCNC: 37 U/L (ref 1–40)
BACTERIA UR QL AUTO: NORMAL /HPF
BASOPHILS # BLD AUTO: 0.01 10*3/MM3 (ref 0–0.2)
BASOPHILS NFR BLD AUTO: 0.2 % (ref 0–1.5)
BILIRUB SERPL-MCNC: 0.2 MG/DL (ref 0.1–1.2)
BILIRUB UR QL STRIP: NEGATIVE
BILIRUB UR QL STRIP: NEGATIVE
BUN BLD-MCNC: 18 MG/DL (ref 8–23)
BUN/CREAT SERPL: 13.7 (ref 7–25)
CALCIUM SPEC-SCNC: 9.5 MG/DL (ref 8.6–10.5)
CHLORIDE SERPL-SCNC: 102 MMOL/L (ref 98–107)
CLARITY UR: CLEAR
CLARITY UR: CLEAR
CO2 SERPL-SCNC: 28.8 MMOL/L (ref 22–29)
COLOR UR: YELLOW
COLOR UR: YELLOW
CREAT BLD-MCNC: 1.31 MG/DL (ref 0.76–1.27)
DEPRECATED RDW RBC AUTO: 52.1 FL (ref 37–54)
EOSINOPHIL # BLD AUTO: 0.11 10*3/MM3 (ref 0–0.7)
EOSINOPHIL NFR BLD AUTO: 2.2 % (ref 0.3–6.2)
ERYTHROCYTE [DISTWIDTH] IN BLOOD BY AUTOMATED COUNT: 14 % (ref 11.5–14.5)
GFR SERPL CREATININE-BSD FRML MDRD: 52 ML/MIN/1.73
GLOBULIN UR ELPH-MCNC: 2.3 GM/DL
GLUCOSE BLD-MCNC: 112 MG/DL (ref 65–99)
GLUCOSE UR STRIP-MCNC: NEGATIVE MG/DL
GLUCOSE UR STRIP-MCNC: NEGATIVE MG/DL
HCT VFR BLD AUTO: 38.5 % (ref 40.4–52.2)
HGB BLD-MCNC: 12.2 G/DL (ref 13.7–17.6)
HGB UR QL STRIP.AUTO: NEGATIVE
HGB UR QL STRIP.AUTO: NEGATIVE
HOLD SPECIMEN: NORMAL
HOLD SPECIMEN: NORMAL
HYALINE CASTS UR QL AUTO: NORMAL /LPF
IMM GRANULOCYTES # BLD: 0.02 10*3/MM3 (ref 0–0.03)
IMM GRANULOCYTES NFR BLD: 0.4 % (ref 0–0.5)
INR PPP: 2.36 (ref 0.9–1.1)
KETONES UR QL STRIP: NEGATIVE
KETONES UR QL STRIP: NEGATIVE
LEUKOCYTE ESTERASE UR QL STRIP.AUTO: ABNORMAL
LEUKOCYTE ESTERASE UR QL STRIP.AUTO: ABNORMAL
LYMPHOCYTES # BLD AUTO: 1.39 10*3/MM3 (ref 0.9–4.8)
LYMPHOCYTES NFR BLD AUTO: 27.7 % (ref 19.6–45.3)
MCH RBC QN AUTO: 32.2 PG (ref 27–32.7)
MCHC RBC AUTO-ENTMCNC: 31.7 G/DL (ref 32.6–36.4)
MCV RBC AUTO: 101.6 FL (ref 79.8–96.2)
MONOCYTES # BLD AUTO: 0.72 10*3/MM3 (ref 0.2–1.2)
MONOCYTES NFR BLD AUTO: 14.3 % (ref 5–12)
NEUTROPHILS # BLD AUTO: 2.77 10*3/MM3 (ref 1.9–8.1)
NEUTROPHILS NFR BLD AUTO: 55.2 % (ref 42.7–76)
NITRITE UR QL STRIP: NEGATIVE
NITRITE UR QL STRIP: NEGATIVE
PH UR STRIP.AUTO: <=5 [PH] (ref 5–8)
PH UR STRIP.AUTO: <=5 [PH] (ref 5–8)
PLATELET # BLD AUTO: 191 10*3/MM3 (ref 140–500)
PMV BLD AUTO: 10.6 FL (ref 6–12)
POTASSIUM BLD-SCNC: 5 MMOL/L (ref 3.5–5.2)
PROT SERPL-MCNC: 6.5 G/DL (ref 6–8.5)
PROT UR QL STRIP: NEGATIVE
PROT UR QL STRIP: NEGATIVE
PROTHROMBIN TIME: 25.4 SECONDS (ref 11.7–14.2)
RBC # BLD AUTO: 3.79 10*6/MM3 (ref 4.6–6)
RBC # UR: NORMAL /HPF
REF LAB TEST METHOD: NORMAL
SODIUM BLD-SCNC: 142 MMOL/L (ref 136–145)
SP GR UR STRIP: 1.02 (ref 1–1.03)
SP GR UR STRIP: 1.02 (ref 1–1.03)
SQUAMOUS #/AREA URNS HPF: NORMAL /HPF
UROBILINOGEN UR QL STRIP: ABNORMAL
UROBILINOGEN UR QL STRIP: ABNORMAL
WBC NRBC COR # BLD: 5.02 10*3/MM3 (ref 4.5–10.7)
WBC UR QL AUTO: NORMAL /HPF
WHOLE BLOOD HOLD SPECIMEN: NORMAL
WHOLE BLOOD HOLD SPECIMEN: NORMAL

## 2018-02-17 PROCEDURE — 80053 COMPREHEN METABOLIC PANEL: CPT | Performed by: EMERGENCY MEDICINE

## 2018-02-17 PROCEDURE — 81001 URINALYSIS AUTO W/SCOPE: CPT | Performed by: EMERGENCY MEDICINE

## 2018-02-17 PROCEDURE — 93005 ELECTROCARDIOGRAM TRACING: CPT | Performed by: EMERGENCY MEDICINE

## 2018-02-17 PROCEDURE — 93010 ELECTROCARDIOGRAM REPORT: CPT | Performed by: INTERNAL MEDICINE

## 2018-02-17 PROCEDURE — 85610 PROTHROMBIN TIME: CPT | Performed by: EMERGENCY MEDICINE

## 2018-02-17 PROCEDURE — 85025 COMPLETE CBC W/AUTO DIFF WBC: CPT | Performed by: EMERGENCY MEDICINE

## 2018-02-17 PROCEDURE — 99285 EMERGENCY DEPT VISIT HI MDM: CPT

## 2018-02-17 PROCEDURE — 70450 CT HEAD/BRAIN W/O DYE: CPT

## 2018-02-17 PROCEDURE — 81003 URINALYSIS AUTO W/O SCOPE: CPT | Performed by: EMERGENCY MEDICINE

## 2018-02-17 RX ORDER — SODIUM CHLORIDE 0.9 % (FLUSH) 0.9 %
10 SYRINGE (ML) INJECTION AS NEEDED
Status: DISCONTINUED | OUTPATIENT
Start: 2018-02-17 | End: 2018-02-17 | Stop reason: HOSPADM

## 2018-02-19 ENCOUNTER — APPOINTMENT (OUTPATIENT)
Dept: SLEEP MEDICINE | Facility: HOSPITAL | Age: 83
End: 2018-02-19
Attending: INTERNAL MEDICINE

## 2018-02-19 ENCOUNTER — TELEPHONE (OUTPATIENT)
Dept: CARDIOLOGY | Facility: CLINIC | Age: 83
End: 2018-02-19

## 2018-02-19 ENCOUNTER — TELEPHONE (OUTPATIENT)
Dept: SOCIAL WORK | Facility: HOSPITAL | Age: 83
End: 2018-02-19

## 2018-02-22 ENCOUNTER — OFFICE VISIT (OUTPATIENT)
Dept: INTERNAL MEDICINE | Facility: CLINIC | Age: 83
End: 2018-02-22

## 2018-02-22 VITALS
OXYGEN SATURATION: 97 % | WEIGHT: 215 LBS | DIASTOLIC BLOOD PRESSURE: 80 MMHG | HEART RATE: 85 BPM | SYSTOLIC BLOOD PRESSURE: 130 MMHG | HEIGHT: 71 IN | BODY MASS INDEX: 30.1 KG/M2

## 2018-02-22 DIAGNOSIS — R41.0 TRANSIENT CONFUSION: Primary | ICD-10-CM

## 2018-02-22 DIAGNOSIS — N17.9 AKI (ACUTE KIDNEY INJURY) (HCC): ICD-10-CM

## 2018-02-22 DIAGNOSIS — N40.1 BENIGN NON-NODULAR PROSTATIC HYPERPLASIA WITH LOWER URINARY TRACT SYMPTOMS: ICD-10-CM

## 2018-02-22 DIAGNOSIS — C61 PROSTATE CANCER (HCC): ICD-10-CM

## 2018-02-22 DIAGNOSIS — I63.40 CEREBROVASCULAR ACCIDENT (CVA) DUE TO EMBOLISM OF CEREBRAL ARTERY (HCC): ICD-10-CM

## 2018-02-22 PROCEDURE — 99214 OFFICE O/P EST MOD 30 MIN: CPT | Performed by: INTERNAL MEDICINE

## 2018-02-24 LAB
ALBUMIN/CREAT UR: 11.3 MG/G CREAT (ref 0–30)
APPEARANCE UR: CLEAR
BACTERIA #/AREA URNS HPF: ABNORMAL /HPF
BACTERIA UR CULT: NO GROWTH
BACTERIA UR CULT: NORMAL
BILIRUB UR QL STRIP: NEGATIVE
BUN SERPL-MCNC: 17 MG/DL (ref 8–23)
BUN/CREAT SERPL: 16 (ref 7–25)
CALCIUM SERPL-MCNC: 10.3 MG/DL (ref 8.6–10.5)
CASTS URNS MICRO: ABNORMAL
CASTS URNS QL MICRO: PRESENT /LPF
CHLORIDE SERPL-SCNC: 103 MMOL/L (ref 98–107)
CO2 SERPL-SCNC: 31.5 MMOL/L (ref 22–29)
COLOR UR: YELLOW
CREAT SERPL-MCNC: 1.06 MG/DL (ref 0.76–1.27)
CREAT UR-MCNC: 43.4 MG/DL
EPI CELLS #/AREA URNS HPF: ABNORMAL /HPF
GFR SERPLBLD CREATININE-BSD FMLA CKD-EPI: 66 ML/MIN/1.73
GFR SERPLBLD CREATININE-BSD FMLA CKD-EPI: 80 ML/MIN/1.73
GLUCOSE SERPL-MCNC: 99 MG/DL (ref 65–99)
GLUCOSE UR QL: NEGATIVE
HGB UR QL STRIP: NEGATIVE
KETONES UR QL STRIP: NEGATIVE
LEUKOCYTE ESTERASE UR QL STRIP: ABNORMAL
MICRO URNS: ABNORMAL
MICROALBUMIN UR-MCNC: 4.9 UG/ML
MUCOUS THREADS URNS QL MICRO: PRESENT /HPF
NITRITE UR QL STRIP: NEGATIVE
PH UR STRIP: 5 [PH] (ref 5–7.5)
POTASSIUM SERPL-SCNC: 5.6 MMOL/L (ref 3.5–5.2)
PROT UR QL STRIP: NEGATIVE
RBC #/AREA URNS HPF: ABNORMAL /HPF
SODIUM 24H UR-SCNC: 130 MMOL/L
SODIUM SERPL-SCNC: 139 MMOL/L (ref 136–145)
SP GR UR: 1.01 (ref 1–1.03)
URINALYSIS REFLEX: ABNORMAL
UROBILINOGEN UR STRIP-MCNC: 0.2 MG/DL (ref 0.2–1)
WBC #/AREA URNS HPF: ABNORMAL /HPF

## 2018-02-26 ENCOUNTER — TELEPHONE (OUTPATIENT)
Dept: INTERNAL MEDICINE | Facility: CLINIC | Age: 83
End: 2018-02-26

## 2018-03-09 DIAGNOSIS — I10 ESSENTIAL HYPERTENSION: Primary | ICD-10-CM

## 2018-03-12 LAB
BUN SERPL-MCNC: 20 MG/DL (ref 8–23)
BUN/CREAT SERPL: 17.4 (ref 7–25)
CALCIUM SERPL-MCNC: 9.6 MG/DL (ref 8.6–10.5)
CHLORIDE SERPL-SCNC: 100 MMOL/L (ref 98–107)
CO2 SERPL-SCNC: 30.7 MMOL/L (ref 22–29)
CREAT SERPL-MCNC: 1.15 MG/DL (ref 0.76–1.27)
GFR SERPLBLD CREATININE-BSD FMLA CKD-EPI: 60 ML/MIN/1.73
GFR SERPLBLD CREATININE-BSD FMLA CKD-EPI: 73 ML/MIN/1.73
GLUCOSE SERPL-MCNC: 165 MG/DL (ref 65–99)
POTASSIUM SERPL-SCNC: 5.1 MMOL/L (ref 3.5–5.2)
SODIUM SERPL-SCNC: 140 MMOL/L (ref 136–145)

## 2018-03-23 DIAGNOSIS — I10 ESSENTIAL HYPERTENSION: Primary | ICD-10-CM

## 2018-03-23 DIAGNOSIS — R80.9 CONTROLLED TYPE 2 DIABETES MELLITUS WITH MICROALBUMINURIA, WITHOUT LONG-TERM CURRENT USE OF INSULIN (HCC): ICD-10-CM

## 2018-03-23 DIAGNOSIS — E11.29 CONTROLLED TYPE 2 DIABETES MELLITUS WITH MICROALBUMINURIA, WITHOUT LONG-TERM CURRENT USE OF INSULIN (HCC): ICD-10-CM

## 2018-03-26 LAB
ALBUMIN SERPL-MCNC: 4.2 G/DL (ref 3.5–5.2)
ALBUMIN/GLOB SERPL: 1.6 G/DL
ALP SERPL-CCNC: 82 U/L (ref 39–117)
ALT SERPL-CCNC: 25 U/L (ref 1–41)
AST SERPL-CCNC: 25 U/L (ref 1–40)
BASOPHILS # BLD AUTO: 0.01 10*3/MM3 (ref 0–0.2)
BASOPHILS NFR BLD AUTO: 0.2 % (ref 0–1.5)
BILIRUB SERPL-MCNC: 0.8 MG/DL (ref 0.1–1.2)
BUN SERPL-MCNC: 17 MG/DL (ref 8–23)
BUN/CREAT SERPL: 15.5 (ref 7–25)
CALCIUM SERPL-MCNC: 9.8 MG/DL (ref 8.6–10.5)
CHLORIDE SERPL-SCNC: 102 MMOL/L (ref 98–107)
CO2 SERPL-SCNC: 30.1 MMOL/L (ref 22–29)
CREAT SERPL-MCNC: 1.1 MG/DL (ref 0.76–1.27)
EOSINOPHIL # BLD AUTO: 0.12 10*3/MM3 (ref 0–0.7)
EOSINOPHIL NFR BLD AUTO: 2.5 % (ref 0.3–6.2)
ERYTHROCYTE [DISTWIDTH] IN BLOOD BY AUTOMATED COUNT: 14.2 % (ref 11.5–14.5)
FERRITIN SERPL-MCNC: 187.1 NG/ML (ref 30–400)
GFR SERPLBLD CREATININE-BSD FMLA CKD-EPI: 63 ML/MIN/1.73
GFR SERPLBLD CREATININE-BSD FMLA CKD-EPI: 77 ML/MIN/1.73
GLOBULIN SER CALC-MCNC: 2.6 GM/DL
GLUCOSE SERPL-MCNC: 115 MG/DL (ref 65–99)
HBA1C MFR BLD: 5.8 % (ref 4.8–5.6)
HCT VFR BLD AUTO: 40.6 % (ref 40.4–52.2)
HGB BLD-MCNC: 13 G/DL (ref 13.7–17.6)
IMM GRANULOCYTES # BLD: 0 10*3/MM3 (ref 0–0.03)
IMM GRANULOCYTES NFR BLD: 0 % (ref 0–0.5)
LYMPHOCYTES # BLD AUTO: 1.35 10*3/MM3 (ref 0.9–4.8)
LYMPHOCYTES NFR BLD AUTO: 28.1 % (ref 19.6–45.3)
MCH RBC QN AUTO: 33.2 PG (ref 27–32.7)
MCHC RBC AUTO-ENTMCNC: 32 G/DL (ref 32.6–36.4)
MCV RBC AUTO: 103.6 FL (ref 79.8–96.2)
MONOCYTES # BLD AUTO: 0.62 10*3/MM3 (ref 0.2–1.2)
MONOCYTES NFR BLD AUTO: 12.9 % (ref 5–12)
NEUTROPHILS # BLD AUTO: 2.7 10*3/MM3 (ref 1.9–8.1)
NEUTROPHILS NFR BLD AUTO: 56.3 % (ref 42.7–76)
PLATELET # BLD AUTO: 169 10*3/MM3 (ref 140–500)
POTASSIUM SERPL-SCNC: 5.7 MMOL/L (ref 3.5–5.2)
PROT SERPL-MCNC: 6.8 G/DL (ref 6–8.5)
RBC # BLD AUTO: 3.92 10*6/MM3 (ref 4.6–6)
SODIUM SERPL-SCNC: 141 MMOL/L (ref 136–145)
URATE SERPL-MCNC: 8 MG/DL (ref 3.4–7)
WBC # BLD AUTO: 4.8 10*3/MM3 (ref 4.5–10.7)

## 2018-04-02 ENCOUNTER — OFFICE VISIT (OUTPATIENT)
Dept: INTERNAL MEDICINE | Facility: CLINIC | Age: 83
End: 2018-04-02

## 2018-04-02 ENCOUNTER — HOSPITAL ENCOUNTER (OUTPATIENT)
Dept: CARDIOLOGY | Facility: HOSPITAL | Age: 83
Setting detail: RECURRING SERIES
Discharge: HOME | End: 2018-04-02
Attending: UROLOGY

## 2018-04-02 VITALS
HEIGHT: 71 IN | BODY MASS INDEX: 30.24 KG/M2 | HEART RATE: 94 BPM | TEMPERATURE: 98 F | SYSTOLIC BLOOD PRESSURE: 128 MMHG | OXYGEN SATURATION: 94 % | DIASTOLIC BLOOD PRESSURE: 80 MMHG | WEIGHT: 216 LBS

## 2018-04-02 DIAGNOSIS — E87.5 HYPERKALEMIA: ICD-10-CM

## 2018-04-02 DIAGNOSIS — H61.21 HEARING LOSS DUE TO CERUMEN IMPACTION, RIGHT: ICD-10-CM

## 2018-04-02 DIAGNOSIS — H91.93 BILATERAL HEARING LOSS, UNSPECIFIED HEARING LOSS TYPE: ICD-10-CM

## 2018-04-02 DIAGNOSIS — E79.0 ELEVATED BLOOD URIC ACID LEVEL: ICD-10-CM

## 2018-04-02 DIAGNOSIS — I48.19 PERSISTENT ATRIAL FIBRILLATION (HCC): ICD-10-CM

## 2018-04-02 DIAGNOSIS — I10 ESSENTIAL HYPERTENSION: Primary | ICD-10-CM

## 2018-04-02 DIAGNOSIS — I63.40 CEREBROVASCULAR ACCIDENT (CVA) DUE TO EMBOLISM OF CEREBRAL ARTERY (HCC): ICD-10-CM

## 2018-04-02 DIAGNOSIS — R80.9 CONTROLLED TYPE 2 DIABETES MELLITUS WITH MICROALBUMINURIA, WITHOUT LONG-TERM CURRENT USE OF INSULIN (HCC): ICD-10-CM

## 2018-04-02 DIAGNOSIS — E11.29 CONTROLLED TYPE 2 DIABETES MELLITUS WITH MICROALBUMINURIA, WITHOUT LONG-TERM CURRENT USE OF INSULIN (HCC): ICD-10-CM

## 2018-04-02 LAB
BUN SERPL-MCNC: 17 MG/DL (ref 8–23)
BUN/CREAT SERPL: 15 (ref 7–25)
CALCIUM SERPL-MCNC: 10.2 MG/DL (ref 8.6–10.5)
CHLORIDE SERPL-SCNC: 103 MMOL/L (ref 98–107)
CO2 SERPL-SCNC: 29.4 MMOL/L (ref 22–29)
CREAT SERPL-MCNC: 1.13 MG/DL (ref 0.76–1.27)
GFR SERPLBLD CREATININE-BSD FMLA CKD-EPI: 61 ML/MIN/1.73
GFR SERPLBLD CREATININE-BSD FMLA CKD-EPI: 74 ML/MIN/1.73
GLUCOSE SERPL-MCNC: 148 MG/DL (ref 65–99)
POTASSIUM SERPL-SCNC: 5.2 MMOL/L (ref 3.5–5.2)
SODIUM SERPL-SCNC: 145 MMOL/L (ref 136–145)

## 2018-04-02 PROCEDURE — 99214 OFFICE O/P EST MOD 30 MIN: CPT | Performed by: INTERNAL MEDICINE

## 2018-04-02 PROCEDURE — 36416 COLLJ CAPILLARY BLOOD SPEC: CPT

## 2018-04-02 PROCEDURE — 85610 PROTHROMBIN TIME: CPT

## 2018-04-12 ENCOUNTER — CLINICAL SUPPORT NO REQUIREMENTS (OUTPATIENT)
Dept: CARDIOLOGY | Facility: CLINIC | Age: 83
End: 2018-04-12

## 2018-04-12 DIAGNOSIS — I47.20 VENTRICULAR TACHYCARDIA (HCC): Primary | ICD-10-CM

## 2018-04-12 PROCEDURE — 93295 DEV INTERROG REMOTE 1/2/MLT: CPT | Performed by: INTERNAL MEDICINE

## 2018-04-12 PROCEDURE — 93296 REM INTERROG EVL PM/IDS: CPT | Performed by: INTERNAL MEDICINE

## 2018-04-13 ENCOUNTER — TELEPHONE (OUTPATIENT)
Dept: INTERNAL MEDICINE | Facility: CLINIC | Age: 83
End: 2018-04-13

## 2018-04-17 ENCOUNTER — HOSPITAL ENCOUNTER (OUTPATIENT)
Dept: CARDIOLOGY | Facility: HOSPITAL | Age: 83
Setting detail: RECURRING SERIES
Discharge: HOME | End: 2018-04-17
Attending: UROLOGY

## 2018-04-17 PROCEDURE — 85610 PROTHROMBIN TIME: CPT

## 2018-04-17 PROCEDURE — 36416 COLLJ CAPILLARY BLOOD SPEC: CPT

## 2018-04-26 ENCOUNTER — TELEPHONE (OUTPATIENT)
Dept: INTERNAL MEDICINE | Facility: CLINIC | Age: 83
End: 2018-04-26

## 2018-04-26 DIAGNOSIS — R80.9 MICROALBUMINURIA: ICD-10-CM

## 2018-04-26 RX ORDER — LISINOPRIL 5 MG/1
5 TABLET ORAL DAILY
Qty: 90 TABLET | Refills: 3 | Status: SHIPPED | OUTPATIENT
Start: 2018-04-26 | End: 2018-09-14

## 2018-04-26 RX ORDER — CARVEDILOL 25 MG/1
25 TABLET ORAL 2 TIMES DAILY WITH MEALS
Qty: 180 TABLET | Refills: 2 | Status: SHIPPED | OUTPATIENT
Start: 2018-04-26 | End: 2018-05-02 | Stop reason: SDUPTHER

## 2018-05-01 DIAGNOSIS — R80.9 MICROALBUMINURIA: ICD-10-CM

## 2018-05-02 RX ORDER — CARVEDILOL 25 MG/1
TABLET ORAL
Qty: 360 TABLET | Refills: 0 | OUTPATIENT
Start: 2018-05-02

## 2018-05-02 RX ORDER — CARVEDILOL 25 MG/1
25 TABLET ORAL 2 TIMES DAILY WITH MEALS
Qty: 180 TABLET | Refills: 2 | Status: SHIPPED | OUTPATIENT
Start: 2018-05-02 | End: 2019-02-04 | Stop reason: SDUPTHER

## 2018-05-02 RX ORDER — LISINOPRIL 5 MG/1
TABLET ORAL
Qty: 30 TABLET | Refills: 4 | Status: SHIPPED | OUTPATIENT
Start: 2018-05-02 | End: 2018-07-13 | Stop reason: SDUPTHER

## 2018-05-16 ENCOUNTER — TELEPHONE (OUTPATIENT)
Dept: INTERNAL MEDICINE | Facility: CLINIC | Age: 83
End: 2018-05-16

## 2018-05-18 RX ORDER — FUROSEMIDE 20 MG/1
TABLET ORAL
Qty: 45 TABLET | Refills: 3 | Status: SHIPPED | OUTPATIENT
Start: 2018-05-18 | End: 2018-05-22 | Stop reason: SDUPTHER

## 2018-05-22 ENCOUNTER — TELEPHONE (OUTPATIENT)
Dept: INTERNAL MEDICINE | Facility: CLINIC | Age: 83
End: 2018-05-22

## 2018-05-22 RX ORDER — FUROSEMIDE 20 MG/1
TABLET ORAL
Qty: 10 TABLET | Refills: 0 | Status: SHIPPED | OUTPATIENT
Start: 2018-05-22 | End: 2018-10-12

## 2018-05-23 ENCOUNTER — HOSPITAL ENCOUNTER (OUTPATIENT)
Dept: CARDIOLOGY | Facility: HOSPITAL | Age: 83
Setting detail: RECURRING SERIES
Discharge: HOME | End: 2018-05-23

## 2018-05-23 PROCEDURE — 85610 PROTHROMBIN TIME: CPT

## 2018-05-23 PROCEDURE — 36416 COLLJ CAPILLARY BLOOD SPEC: CPT

## 2018-06-25 ENCOUNTER — OFFICE VISIT (OUTPATIENT)
Dept: INTERNAL MEDICINE | Facility: CLINIC | Age: 83
End: 2018-06-25

## 2018-06-25 VITALS
TEMPERATURE: 95.7 F | HEIGHT: 71 IN | DIASTOLIC BLOOD PRESSURE: 74 MMHG | BODY MASS INDEX: 30.24 KG/M2 | WEIGHT: 216 LBS | HEART RATE: 72 BPM | OXYGEN SATURATION: 96 % | SYSTOLIC BLOOD PRESSURE: 128 MMHG

## 2018-06-25 DIAGNOSIS — R35.1 BPH ASSOCIATED WITH NOCTURIA: ICD-10-CM

## 2018-06-25 DIAGNOSIS — N40.1 BPH ASSOCIATED WITH NOCTURIA: ICD-10-CM

## 2018-06-25 DIAGNOSIS — R35.0 URINARY FREQUENCY: Primary | ICD-10-CM

## 2018-06-25 DIAGNOSIS — C61 PROSTATE CANCER (HCC): ICD-10-CM

## 2018-06-25 LAB
BILIRUB BLD-MCNC: NEGATIVE MG/DL
CLARITY, POC: CLEAR
COLOR UR: YELLOW
GLUCOSE UR STRIP-MCNC: NEGATIVE MG/DL
KETONES UR QL: NEGATIVE
LEUKOCYTE EST, POC: NEGATIVE
NITRITE UR-MCNC: NEGATIVE MG/ML
PH UR: 5.5 [PH] (ref 5–8)
PROT UR STRIP-MCNC: ABNORMAL MG/DL
RBC # UR STRIP: ABNORMAL /UL
SP GR UR: 1.03 (ref 1–1.03)
UROBILINOGEN UR QL: NORMAL

## 2018-06-25 PROCEDURE — 81003 URINALYSIS AUTO W/O SCOPE: CPT | Performed by: INTERNAL MEDICINE

## 2018-06-25 PROCEDURE — 99213 OFFICE O/P EST LOW 20 MIN: CPT | Performed by: INTERNAL MEDICINE

## 2018-06-28 LAB
BACTERIA UR CULT: ABNORMAL
BACTERIA UR CULT: ABNORMAL
OTHER ANTIBIOTIC SUSC ISLT: ABNORMAL

## 2018-06-29 ENCOUNTER — TELEPHONE (OUTPATIENT)
Dept: INTERNAL MEDICINE | Facility: CLINIC | Age: 83
End: 2018-06-29

## 2018-06-29 RX ORDER — SULFAMETHOXAZOLE AND TRIMETHOPRIM 800; 160 MG/1; MG/1
1 TABLET ORAL 2 TIMES DAILY
Qty: 14 TABLET | Refills: 0 | Status: SHIPPED | OUTPATIENT
Start: 2018-06-29 | End: 2018-07-13

## 2018-07-02 ENCOUNTER — HOSPITAL ENCOUNTER (OUTPATIENT)
Dept: CARDIOLOGY | Facility: HOSPITAL | Age: 83
Setting detail: RECURRING SERIES
Discharge: HOME | End: 2018-07-02

## 2018-07-02 PROCEDURE — 85610 PROTHROMBIN TIME: CPT

## 2018-07-02 PROCEDURE — 36416 COLLJ CAPILLARY BLOOD SPEC: CPT

## 2018-07-12 ENCOUNTER — CLINICAL SUPPORT NO REQUIREMENTS (OUTPATIENT)
Dept: CARDIOLOGY | Facility: CLINIC | Age: 83
End: 2018-07-12

## 2018-07-12 ENCOUNTER — TELEPHONE (OUTPATIENT)
Dept: CARDIOLOGY | Facility: CLINIC | Age: 83
End: 2018-07-12

## 2018-07-12 DIAGNOSIS — I47.20 VENTRICULAR TACHYCARDIA (HCC): Primary | ICD-10-CM

## 2018-07-12 PROCEDURE — 93283 PRGRMG EVAL IMPLANTABLE DFB: CPT | Performed by: INTERNAL MEDICINE

## 2018-07-13 ENCOUNTER — TELEPHONE (OUTPATIENT)
Dept: CARDIOLOGY | Facility: CLINIC | Age: 83
End: 2018-07-13

## 2018-07-13 ENCOUNTER — OFFICE VISIT (OUTPATIENT)
Dept: CARDIOLOGY | Facility: CLINIC | Age: 83
End: 2018-07-13

## 2018-07-13 VITALS
WEIGHT: 218 LBS | BODY MASS INDEX: 30.52 KG/M2 | HEIGHT: 71 IN | SYSTOLIC BLOOD PRESSURE: 124 MMHG | HEART RATE: 62 BPM | DIASTOLIC BLOOD PRESSURE: 76 MMHG

## 2018-07-13 DIAGNOSIS — I48.19 PERSISTENT ATRIAL FIBRILLATION (HCC): Primary | ICD-10-CM

## 2018-07-13 DIAGNOSIS — I71.21 ANEURYSM OF AORTIC ROOT (HCC): ICD-10-CM

## 2018-07-13 DIAGNOSIS — I49.5 SICK SINUS SYNDROME (HCC): ICD-10-CM

## 2018-07-13 DIAGNOSIS — I34.0 NON-RHEUMATIC MITRAL REGURGITATION: ICD-10-CM

## 2018-07-13 PROCEDURE — 99214 OFFICE O/P EST MOD 30 MIN: CPT | Performed by: INTERNAL MEDICINE

## 2018-07-13 PROCEDURE — 93000 ELECTROCARDIOGRAM COMPLETE: CPT | Performed by: INTERNAL MEDICINE

## 2018-07-13 RX ORDER — WARFARIN SODIUM 5 MG/1
TABLET ORAL
Qty: 90 TABLET | Refills: 1 | Status: SHIPPED | OUTPATIENT
Start: 2018-07-13 | End: 2018-07-19 | Stop reason: SDUPTHER

## 2018-07-19 DIAGNOSIS — R26.89 LOSS OF BALANCE: Primary | ICD-10-CM

## 2018-07-19 DIAGNOSIS — I63.40 CEREBROVASCULAR ACCIDENT (CVA) DUE TO EMBOLISM OF CEREBRAL ARTERY (HCC): ICD-10-CM

## 2018-07-19 RX ORDER — WARFARIN SODIUM 5 MG/1
5 TABLET ORAL DAILY
Qty: 90 TABLET | Refills: 1 | Status: SHIPPED | OUTPATIENT
Start: 2018-07-19 | End: 2018-10-14 | Stop reason: HOSPADM

## 2018-08-06 DIAGNOSIS — E79.0 ELEVATED BLOOD URIC ACID LEVEL: ICD-10-CM

## 2018-08-06 DIAGNOSIS — I63.40 CEREBROVASCULAR ACCIDENT (CVA) DUE TO EMBOLISM OF CEREBRAL ARTERY (HCC): ICD-10-CM

## 2018-08-06 DIAGNOSIS — R80.9 CONTROLLED TYPE 2 DIABETES MELLITUS WITH MICROALBUMINURIA, WITHOUT LONG-TERM CURRENT USE OF INSULIN (HCC): Primary | ICD-10-CM

## 2018-08-06 DIAGNOSIS — I10 ESSENTIAL HYPERTENSION: ICD-10-CM

## 2018-08-06 DIAGNOSIS — E11.29 CONTROLLED TYPE 2 DIABETES MELLITUS WITH MICROALBUMINURIA, WITHOUT LONG-TERM CURRENT USE OF INSULIN (HCC): Primary | ICD-10-CM

## 2018-08-07 ENCOUNTER — HOSPITAL ENCOUNTER (OUTPATIENT)
Dept: CARDIOLOGY | Facility: HOSPITAL | Age: 83
Setting detail: RECURRING SERIES
Discharge: HOME | End: 2018-08-07

## 2018-08-07 LAB
ALBUMIN SERPL-MCNC: 4.6 G/DL (ref 3.5–5.2)
ALBUMIN/GLOB SERPL: 2 G/DL
ALP SERPL-CCNC: 73 U/L (ref 39–117)
ALT SERPL-CCNC: 29 U/L (ref 1–41)
AST SERPL-CCNC: 22 U/L (ref 1–40)
BASOPHILS # BLD AUTO: 0.01 10*3/MM3 (ref 0–0.2)
BASOPHILS NFR BLD AUTO: 0.1 % (ref 0–1.5)
BILIRUB SERPL-MCNC: 0.6 MG/DL (ref 0.1–1.2)
BUN SERPL-MCNC: 18 MG/DL (ref 8–23)
BUN/CREAT SERPL: 14.8 (ref 7–25)
CALCIUM SERPL-MCNC: 10.1 MG/DL (ref 8.6–10.5)
CHLORIDE SERPL-SCNC: 101 MMOL/L (ref 98–107)
CO2 SERPL-SCNC: 28.7 MMOL/L (ref 22–29)
CREAT SERPL-MCNC: 1.22 MG/DL (ref 0.76–1.27)
EOSINOPHIL # BLD AUTO: 0.15 10*3/MM3 (ref 0–0.7)
EOSINOPHIL NFR BLD AUTO: 2.2 % (ref 0.3–6.2)
ERYTHROCYTE [DISTWIDTH] IN BLOOD BY AUTOMATED COUNT: 13.1 % (ref 11.5–14.5)
FERRITIN SERPL-MCNC: 292.2 NG/ML (ref 30–400)
GLOBULIN SER CALC-MCNC: 2.3 GM/DL
GLUCOSE SERPL-MCNC: 148 MG/DL (ref 65–99)
HBA1C MFR BLD: 6.38 % (ref 4.8–5.6)
HCT VFR BLD AUTO: 40.9 % (ref 40.4–52.2)
HGB BLD-MCNC: 13.3 G/DL (ref 13.7–17.6)
IMM GRANULOCYTES # BLD: 0.01 10*3/MM3 (ref 0–0.03)
IMM GRANULOCYTES NFR BLD: 0.1 % (ref 0–0.5)
LYMPHOCYTES # BLD AUTO: 1.07 10*3/MM3 (ref 0.9–4.8)
LYMPHOCYTES NFR BLD AUTO: 15.6 % (ref 19.6–45.3)
MCH RBC QN AUTO: 32.9 PG (ref 27–32.7)
MCHC RBC AUTO-ENTMCNC: 32.5 G/DL (ref 32.6–36.4)
MCV RBC AUTO: 101.2 FL (ref 79.8–96.2)
MONOCYTES # BLD AUTO: 0.71 10*3/MM3 (ref 0.2–1.2)
MONOCYTES NFR BLD AUTO: 10.4 % (ref 5–12)
NEUTROPHILS # BLD AUTO: 4.9 10*3/MM3 (ref 1.9–8.1)
NEUTROPHILS NFR BLD AUTO: 71.7 % (ref 42.7–76)
PLATELET # BLD AUTO: 155 10*3/MM3 (ref 140–500)
POTASSIUM SERPL-SCNC: 5.1 MMOL/L (ref 3.5–5.2)
PROT SERPL-MCNC: 6.9 G/DL (ref 6–8.5)
RBC # BLD AUTO: 4.04 10*6/MM3 (ref 4.6–6)
SODIUM SERPL-SCNC: 142 MMOL/L (ref 136–145)
WBC # BLD AUTO: 6.84 10*3/MM3 (ref 4.5–10.7)

## 2018-08-07 PROCEDURE — 36416 COLLJ CAPILLARY BLOOD SPEC: CPT

## 2018-08-07 PROCEDURE — 85610 PROTHROMBIN TIME: CPT

## 2018-08-14 ENCOUNTER — HOSPITAL ENCOUNTER (OUTPATIENT)
Dept: CARDIOLOGY | Facility: HOSPITAL | Age: 83
Setting detail: RECURRING SERIES
Discharge: HOME | End: 2018-08-14

## 2018-08-14 ENCOUNTER — OFFICE VISIT (OUTPATIENT)
Dept: INTERNAL MEDICINE | Facility: CLINIC | Age: 83
End: 2018-08-14

## 2018-08-14 VITALS
WEIGHT: 215 LBS | OXYGEN SATURATION: 96 % | BODY MASS INDEX: 30.1 KG/M2 | DIASTOLIC BLOOD PRESSURE: 70 MMHG | HEART RATE: 86 BPM | HEIGHT: 71 IN | TEMPERATURE: 97.8 F | SYSTOLIC BLOOD PRESSURE: 120 MMHG

## 2018-08-14 DIAGNOSIS — Z79.01 WARFARIN ANTICOAGULATION: ICD-10-CM

## 2018-08-14 DIAGNOSIS — R42 DIZZINESS: ICD-10-CM

## 2018-08-14 DIAGNOSIS — I63.40 CEREBROVASCULAR ACCIDENT (CVA) DUE TO EMBOLISM OF CEREBRAL ARTERY (HCC): ICD-10-CM

## 2018-08-14 DIAGNOSIS — I48.19 PERSISTENT ATRIAL FIBRILLATION (HCC): ICD-10-CM

## 2018-08-14 DIAGNOSIS — R41.3 MEMORY LOSS: ICD-10-CM

## 2018-08-14 DIAGNOSIS — E11.29 CONTROLLED TYPE 2 DIABETES MELLITUS WITH MICROALBUMINURIA, WITHOUT LONG-TERM CURRENT USE OF INSULIN (HCC): ICD-10-CM

## 2018-08-14 DIAGNOSIS — I10 ESSENTIAL HYPERTENSION: Primary | ICD-10-CM

## 2018-08-14 DIAGNOSIS — H91.93 BILATERAL HEARING LOSS, UNSPECIFIED HEARING LOSS TYPE: ICD-10-CM

## 2018-08-14 DIAGNOSIS — R80.9 CONTROLLED TYPE 2 DIABETES MELLITUS WITH MICROALBUMINURIA, WITHOUT LONG-TERM CURRENT USE OF INSULIN (HCC): ICD-10-CM

## 2018-08-14 PROCEDURE — 99214 OFFICE O/P EST MOD 30 MIN: CPT | Performed by: INTERNAL MEDICINE

## 2018-08-14 PROCEDURE — 85610 PROTHROMBIN TIME: CPT

## 2018-08-14 PROCEDURE — 36416 COLLJ CAPILLARY BLOOD SPEC: CPT

## 2018-08-27 RX ORDER — ATORVASTATIN CALCIUM 20 MG/1
TABLET, FILM COATED ORAL
Qty: 90 TABLET | Refills: 3 | Status: SHIPPED | OUTPATIENT
Start: 2018-08-27 | End: 2018-10-12

## 2018-08-28 ENCOUNTER — TELEPHONE (OUTPATIENT)
Dept: INTERNAL MEDICINE | Facility: CLINIC | Age: 83
End: 2018-08-28

## 2018-08-31 ENCOUNTER — HOSPITAL ENCOUNTER (OUTPATIENT)
Dept: CARDIOLOGY | Facility: HOSPITAL | Age: 83
Setting detail: RECURRING SERIES
Discharge: HOME | End: 2018-08-31

## 2018-08-31 PROCEDURE — 85610 PROTHROMBIN TIME: CPT

## 2018-08-31 PROCEDURE — 36416 COLLJ CAPILLARY BLOOD SPEC: CPT

## 2018-09-07 ENCOUNTER — HOSPITAL ENCOUNTER (OUTPATIENT)
Dept: CARDIOLOGY | Facility: HOSPITAL | Age: 83
Setting detail: RECURRING SERIES
Discharge: HOME | End: 2018-09-07

## 2018-09-07 PROCEDURE — 36416 COLLJ CAPILLARY BLOOD SPEC: CPT

## 2018-09-07 PROCEDURE — 85610 PROTHROMBIN TIME: CPT

## 2018-09-14 ENCOUNTER — HOSPITAL ENCOUNTER (OUTPATIENT)
Dept: CARDIOLOGY | Facility: HOSPITAL | Age: 83
Setting detail: RECURRING SERIES
Discharge: HOME | End: 2018-09-14

## 2018-09-14 ENCOUNTER — OFFICE VISIT (OUTPATIENT)
Dept: INTERNAL MEDICINE | Facility: CLINIC | Age: 83
End: 2018-09-14

## 2018-09-14 VITALS
HEART RATE: 70 BPM | WEIGHT: 217 LBS | DIASTOLIC BLOOD PRESSURE: 72 MMHG | BODY MASS INDEX: 30.38 KG/M2 | TEMPERATURE: 98.1 F | HEIGHT: 71 IN | SYSTOLIC BLOOD PRESSURE: 114 MMHG | OXYGEN SATURATION: 98 %

## 2018-09-14 DIAGNOSIS — R53.81 PHYSICAL DECONDITIONING: ICD-10-CM

## 2018-09-14 DIAGNOSIS — I10 ESSENTIAL HYPERTENSION: ICD-10-CM

## 2018-09-14 DIAGNOSIS — R42 DIZZINESS: Primary | ICD-10-CM

## 2018-09-14 PROCEDURE — 85610 PROTHROMBIN TIME: CPT

## 2018-09-14 PROCEDURE — 99213 OFFICE O/P EST LOW 20 MIN: CPT | Performed by: INTERNAL MEDICINE

## 2018-09-14 PROCEDURE — 36416 COLLJ CAPILLARY BLOOD SPEC: CPT

## 2018-10-01 ENCOUNTER — ANTICOAGULATION VISIT (OUTPATIENT)
Dept: PHARMACY | Facility: HOSPITAL | Age: 83
End: 2018-10-01

## 2018-10-01 DIAGNOSIS — I48.19 PERSISTENT ATRIAL FIBRILLATION (HCC): ICD-10-CM

## 2018-10-01 LAB
INR PPP: 1.9 (ref 0.91–1.09)
PROTHROMBIN TIME: 22.5 SECONDS (ref 10–13.8)

## 2018-10-01 PROCEDURE — G0463 HOSPITAL OUTPT CLINIC VISIT: HCPCS | Performed by: PHARMACIST

## 2018-10-01 PROCEDURE — 36416 COLLJ CAPILLARY BLOOD SPEC: CPT

## 2018-10-01 PROCEDURE — 85610 PROTHROMBIN TIME: CPT

## 2018-10-11 ENCOUNTER — TELEPHONE (OUTPATIENT)
Dept: PHARMACY | Facility: HOSPITAL | Age: 83
End: 2018-10-11

## 2018-10-12 ENCOUNTER — HOSPITAL ENCOUNTER (INPATIENT)
Facility: HOSPITAL | Age: 83
LOS: 2 days | Discharge: HOME | End: 2018-10-14
Attending: EMERGENCY MEDICINE | Admitting: INTERNAL MEDICINE

## 2018-10-12 ENCOUNTER — OFFICE VISIT (OUTPATIENT)
Dept: INTERNAL MEDICINE | Facility: CLINIC | Age: 83
End: 2018-10-12

## 2018-10-12 ENCOUNTER — HOSPITAL ENCOUNTER (OUTPATIENT)
Dept: CT IMAGING | Facility: HOSPITAL | Age: 83
Discharge: HOME | End: 2018-10-12

## 2018-10-12 VITALS
HEART RATE: 92 BPM | OXYGEN SATURATION: 98 % | BODY MASS INDEX: 29.82 KG/M2 | TEMPERATURE: 97.7 F | WEIGHT: 213 LBS | SYSTOLIC BLOOD PRESSURE: 122 MMHG | DIASTOLIC BLOOD PRESSURE: 74 MMHG | HEIGHT: 71 IN

## 2018-10-12 DIAGNOSIS — R10.11 RIGHT UPPER QUADRANT ABDOMINAL PAIN: ICD-10-CM

## 2018-10-12 DIAGNOSIS — K65.1 ABDOMINOPELVIC ABSCESS (HCC): Primary | ICD-10-CM

## 2018-10-12 DIAGNOSIS — K56.609 SBO (SMALL BOWEL OBSTRUCTION) (HCC): ICD-10-CM

## 2018-10-12 DIAGNOSIS — R11.2 NON-INTRACTABLE VOMITING WITH NAUSEA, UNSPECIFIED VOMITING TYPE: ICD-10-CM

## 2018-10-12 DIAGNOSIS — Z79.01 WARFARIN ANTICOAGULATION: ICD-10-CM

## 2018-10-12 DIAGNOSIS — R10.11 RIGHT UPPER QUADRANT ABDOMINAL PAIN: Primary | ICD-10-CM

## 2018-10-12 LAB
ALBUMIN SERPL-MCNC: 3.7 G/DL (ref 3.5–5.2)
ALBUMIN SERPL-MCNC: 3.9 G/DL (ref 3.5–5.2)
ALBUMIN/GLOB SERPL: 1.1 G/DL
ALBUMIN/GLOB SERPL: 1.3 G/DL
ALP SERPL-CCNC: 95 U/L (ref 39–117)
ALP SERPL-CCNC: 99 U/L (ref 39–117)
ALT SERPL W P-5'-P-CCNC: 98 U/L (ref 1–41)
ALT SERPL-CCNC: 84 U/L (ref 1–41)
AMYLASE SERPL-CCNC: 47 U/L (ref 28–100)
ANION GAP SERPL CALCULATED.3IONS-SCNC: 12.7 MMOL/L
AST SERPL-CCNC: 119 U/L (ref 1–40)
AST SERPL-CCNC: 90 U/L (ref 1–40)
BASOPHILS # BLD AUTO: 0.01 10*3/MM3 (ref 0–0.2)
BASOPHILS NFR BLD AUTO: 0.1 % (ref 0–1.5)
BILIRUB BLD-MCNC: ABNORMAL MG/DL
BILIRUB SERPL-MCNC: 0.5 MG/DL (ref 0.1–1.2)
BILIRUB SERPL-MCNC: 0.5 MG/DL (ref 0.1–1.2)
BUN BLD-MCNC: 35 MG/DL (ref 8–23)
BUN SERPL-MCNC: 32 MG/DL (ref 8–23)
BUN/CREAT SERPL: 22.5 (ref 7–25)
BUN/CREAT SERPL: 26.5 (ref 7–25)
CALCIUM SERPL-MCNC: 9.3 MG/DL (ref 8.6–10.5)
CALCIUM SPEC-SCNC: 9.6 MG/DL (ref 8.6–10.5)
CHLORIDE SERPL-SCNC: 97 MMOL/L (ref 98–107)
CHLORIDE SERPL-SCNC: 98 MMOL/L (ref 98–107)
CLARITY, POC: ABNORMAL
CO2 SERPL-SCNC: 24.3 MMOL/L (ref 22–29)
CO2 SERPL-SCNC: 26.7 MMOL/L (ref 22–29)
COLOR UR: ABNORMAL
CREAT BLD-MCNC: 1.32 MG/DL (ref 0.76–1.27)
CREAT BLDA-MCNC: 1.4 MG/DL (ref 0.6–1.3)
CREAT SERPL-MCNC: 1.42 MG/DL (ref 0.76–1.27)
D-LACTATE SERPL-SCNC: 2.2 MMOL/L (ref 0.5–2)
DEPRECATED RDW RBC AUTO: 49.8 FL (ref 37–54)
EOSINOPHIL # BLD AUTO: 0.2 10*3/MM3 (ref 0–0.7)
EOSINOPHIL NFR BLD AUTO: 2.3 % (ref 0.3–6.2)
ERYTHROCYTE [DISTWIDTH] IN BLOOD BY AUTOMATED COUNT: 13.6 % (ref 11.5–14.5)
GFR SERPL CREATININE-BSD FRML MDRD: 51 ML/MIN/1.73
GLOBULIN SER CALC-MCNC: 3 GM/DL
GLOBULIN UR ELPH-MCNC: 3.4 GM/DL
GLUCOSE BLD-MCNC: 102 MG/DL (ref 65–99)
GLUCOSE BLDC GLUCOMTR-MCNC: 101 MG/DL (ref 70–130)
GLUCOSE SERPL-MCNC: 86 MG/DL (ref 65–99)
GLUCOSE UR STRIP-MCNC: NEGATIVE MG/DL
HCT VFR BLD AUTO: 34.4 % (ref 40.4–52.2)
HCT VFR BLDA CALC: 36.8 %
HGB BLD-MCNC: 11.5 G/DL (ref 13.7–17.6)
HGB BLDA-MCNC: 11.8 G/DL
HOLD SPECIMEN: NORMAL
HOLD SPECIMEN: NORMAL
IMM GRANULOCYTES # BLD: 0.03 10*3/MM3 (ref 0–0.03)
IMM GRANULOCYTES NFR BLD: 0.3 % (ref 0–0.5)
INR PPP: 5.1 (ref 0.9–1.1)
KETONES UR QL: ABNORMAL
LEUKOCYTE EST, POC: ABNORMAL
LIPASE SERPL-CCNC: 39 U/L (ref 13–60)
LIPASE SERPL-CCNC: 41 U/L (ref 13–60)
LYMPHOCYTES # BLD AUTO: 1.08 10*3/MM3 (ref 0.9–4.8)
LYMPHOCYTES # BLD: 8.6 %
LYMPHOCYTES NFR BLD AUTO: 12.3 % (ref 19.6–45.3)
MCH RBC QN AUTO: 33.6 PG (ref 27–32.7)
MCH, POC: 33
MCHC RBC AUTO-ENTMCNC: 33.4 G/DL (ref 32.6–36.4)
MCHC, POC: 32.1
MCV RBC AUTO: 100.6 FL (ref 79.8–96.2)
MCV, POC: 102.7
MONOCYTES # BLD AUTO: 1.07 10*3/MM3 (ref 0.2–1.2)
MONOCYTES # BLD: 14.1 %
MONOCYTES NFR BLD AUTO: 12.2 % (ref 5–12)
NEUTROPHILS # BLD AUTO: 6.43 10*3/MM3 (ref 1.9–8.1)
NEUTROPHILS NFR BLD AUTO: 73.1 % (ref 42.7–76)
NITRITE UR-MCNC: NEGATIVE MG/ML
PH UR: 6 [PH] (ref 5–8)
PLATELET # BLD AUTO: 171 10*3/MM3 (ref 140–500)
PLATELET # BLD: 165 10*3/MM3
PMV BLD AUTO: 10.8 FL (ref 6–12)
PMV BLD: 7.8 FL
POC NEUTROPHIL: 77.3 %
POTASSIUM BLD-SCNC: 4.8 MMOL/L (ref 3.5–5.2)
POTASSIUM SERPL-SCNC: 4.6 MMOL/L (ref 3.5–5.2)
PROCALCITONIN SERPL-MCNC: 0.52 NG/ML (ref 0.1–0.25)
PROT SERPL-MCNC: 6.9 G/DL (ref 6–8.5)
PROT SERPL-MCNC: 7.1 G/DL (ref 6–8.5)
PROT UR STRIP-MCNC: ABNORMAL MG/DL
PROTHROMBIN TIME: 46.5 SECONDS (ref 11.7–14.2)
RBC # BLD AUTO: 3.42 10*6/MM3 (ref 4.6–6)
RBC # UR STRIP: ABNORMAL /UL
RBC, POC: 3.58
RDW, POC: 14.4
SODIUM BLD-SCNC: 134 MMOL/L (ref 136–145)
SODIUM SERPL-SCNC: 138 MMOL/L (ref 136–145)
SP GR UR: 1.02 (ref 1–1.03)
UROBILINOGEN UR QL: ABNORMAL
WBC # BLD: 7.9 10*3/UL
WBC NRBC COR # BLD: 8.79 10*3/MM3 (ref 4.5–10.7)
WHOLE BLOOD HOLD SPECIMEN: NORMAL
WHOLE BLOOD HOLD SPECIMEN: NORMAL

## 2018-10-12 PROCEDURE — 25010000002 IOPAMIDOL 61 % SOLUTION: Performed by: INTERNAL MEDICINE

## 2018-10-12 PROCEDURE — 86927 PLASMA FRESH FROZEN: CPT

## 2018-10-12 PROCEDURE — 99214 OFFICE O/P EST MOD 30 MIN: CPT | Performed by: INTERNAL MEDICINE

## 2018-10-12 PROCEDURE — 83605 ASSAY OF LACTIC ACID: CPT | Performed by: EMERGENCY MEDICINE

## 2018-10-12 PROCEDURE — P9017 PLASMA 1 DONOR FRZ W/IN 8 HR: HCPCS

## 2018-10-12 PROCEDURE — 85610 PROTHROMBIN TIME: CPT | Performed by: EMERGENCY MEDICINE

## 2018-10-12 PROCEDURE — 87040 BLOOD CULTURE FOR BACTERIA: CPT | Performed by: EMERGENCY MEDICINE

## 2018-10-12 PROCEDURE — 25810000003 SODIUM CHLORIDE 0.9 % WITH KCL 20 MEQ 20-0.9 MEQ/L-% SOLUTION: Performed by: INTERNAL MEDICINE

## 2018-10-12 PROCEDURE — 36430 TRANSFUSION BLD/BLD COMPNT: CPT

## 2018-10-12 PROCEDURE — 82962 GLUCOSE BLOOD TEST: CPT

## 2018-10-12 PROCEDURE — 25010000002 CEFTRIAXONE PER 250 MG: Performed by: INTERNAL MEDICINE

## 2018-10-12 PROCEDURE — 74019 RADEX ABDOMEN 2 VIEWS: CPT | Performed by: INTERNAL MEDICINE

## 2018-10-12 PROCEDURE — 87040 BLOOD CULTURE FOR BACTERIA: CPT | Performed by: INTERNAL MEDICINE

## 2018-10-12 PROCEDURE — 82565 ASSAY OF CREATININE: CPT

## 2018-10-12 PROCEDURE — 83690 ASSAY OF LIPASE: CPT | Performed by: EMERGENCY MEDICINE

## 2018-10-12 PROCEDURE — 85025 COMPLETE CBC W/AUTO DIFF WBC: CPT | Performed by: EMERGENCY MEDICINE

## 2018-10-12 PROCEDURE — 74177 CT ABD & PELVIS W/CONTRAST: CPT

## 2018-10-12 PROCEDURE — 80053 COMPREHEN METABOLIC PANEL: CPT | Performed by: EMERGENCY MEDICINE

## 2018-10-12 PROCEDURE — 84145 PROCALCITONIN (PCT): CPT | Performed by: EMERGENCY MEDICINE

## 2018-10-12 PROCEDURE — 85025 COMPLETE CBC W/AUTO DIFF WBC: CPT | Performed by: INTERNAL MEDICINE

## 2018-10-12 PROCEDURE — 99284 EMERGENCY DEPT VISIT MOD MDM: CPT

## 2018-10-12 PROCEDURE — 81003 URINALYSIS AUTO W/O SCOPE: CPT | Performed by: INTERNAL MEDICINE

## 2018-10-12 RX ORDER — HYDROCODONE BITARTRATE AND ACETAMINOPHEN 10; 325 MG/1; MG/1
1 TABLET ORAL EVERY 4 HOURS PRN
Status: DISCONTINUED | OUTPATIENT
Start: 2018-10-12 | End: 2018-10-14 | Stop reason: HOSPADM

## 2018-10-12 RX ORDER — SODIUM CHLORIDE 0.9 % (FLUSH) 0.9 %
3 SYRINGE (ML) INJECTION EVERY 12 HOURS SCHEDULED
Status: DISCONTINUED | OUTPATIENT
Start: 2018-10-12 | End: 2018-10-14 | Stop reason: HOSPADM

## 2018-10-12 RX ORDER — ATORVASTATIN CALCIUM 20 MG/1
20 TABLET, FILM COATED ORAL DAILY
Status: DISCONTINUED | OUTPATIENT
Start: 2018-10-13 | End: 2018-10-14 | Stop reason: HOSPADM

## 2018-10-12 RX ORDER — FUROSEMIDE 20 MG/1
10 TABLET ORAL DAILY
COMMUNITY
End: 2018-12-14 | Stop reason: SDUPTHER

## 2018-10-12 RX ORDER — FOLIC ACID 1 MG/1
1 TABLET ORAL DAILY
Status: DISCONTINUED | OUTPATIENT
Start: 2018-10-13 | End: 2018-10-14 | Stop reason: HOSPADM

## 2018-10-12 RX ORDER — CARVEDILOL 25 MG/1
25 TABLET ORAL 2 TIMES DAILY WITH MEALS
Status: DISCONTINUED | OUTPATIENT
Start: 2018-10-12 | End: 2018-10-14 | Stop reason: HOSPADM

## 2018-10-12 RX ORDER — NICOTINE POLACRILEX 4 MG
15 LOZENGE BUCCAL
Status: DISCONTINUED | OUTPATIENT
Start: 2018-10-12 | End: 2018-10-14 | Stop reason: HOSPADM

## 2018-10-12 RX ORDER — ONDANSETRON 2 MG/ML
4 INJECTION INTRAMUSCULAR; INTRAVENOUS EVERY 6 HOURS PRN
Status: DISCONTINUED | OUTPATIENT
Start: 2018-10-12 | End: 2018-10-14 | Stop reason: HOSPADM

## 2018-10-12 RX ORDER — CEFTRIAXONE SODIUM 1 G/50ML
1 INJECTION, SOLUTION INTRAVENOUS EVERY 24 HOURS
Status: DISCONTINUED | OUTPATIENT
Start: 2018-10-12 | End: 2018-10-14 | Stop reason: HOSPADM

## 2018-10-12 RX ORDER — ATORVASTATIN CALCIUM 20 MG/1
20 TABLET, FILM COATED ORAL DAILY
COMMUNITY
End: 2019-09-12 | Stop reason: SDUPTHER

## 2018-10-12 RX ORDER — NALOXONE HCL 0.4 MG/ML
0.4 VIAL (ML) INJECTION
Status: DISCONTINUED | OUTPATIENT
Start: 2018-10-12 | End: 2018-10-14 | Stop reason: HOSPADM

## 2018-10-12 RX ORDER — FOLIC ACID 1 MG/1
1 TABLET ORAL DAILY
COMMUNITY
End: 2019-07-02 | Stop reason: SDUPTHER

## 2018-10-12 RX ORDER — DEXTROSE MONOHYDRATE 25 G/50ML
25 INJECTION, SOLUTION INTRAVENOUS
Status: DISCONTINUED | OUTPATIENT
Start: 2018-10-12 | End: 2018-10-14 | Stop reason: HOSPADM

## 2018-10-12 RX ORDER — SODIUM CHLORIDE 0.9 % (FLUSH) 0.9 %
3-10 SYRINGE (ML) INJECTION AS NEEDED
Status: DISCONTINUED | OUTPATIENT
Start: 2018-10-12 | End: 2018-10-14 | Stop reason: HOSPADM

## 2018-10-12 RX ORDER — SODIUM CHLORIDE AND POTASSIUM CHLORIDE 150; 900 MG/100ML; MG/100ML
125 INJECTION, SOLUTION INTRAVENOUS CONTINUOUS
Status: DISCONTINUED | OUTPATIENT
Start: 2018-10-12 | End: 2018-10-14

## 2018-10-12 RX ORDER — SODIUM CHLORIDE 0.9 % (FLUSH) 0.9 %
10 SYRINGE (ML) INJECTION AS NEEDED
Status: DISCONTINUED | OUTPATIENT
Start: 2018-10-12 | End: 2018-10-14 | Stop reason: HOSPADM

## 2018-10-12 RX ADMIN — IOPAMIDOL 85 ML: 612 INJECTION, SOLUTION INTRAVENOUS at 18:01

## 2018-10-12 RX ADMIN — METRONIDAZOLE 500 MG: 500 INJECTION, SOLUTION INTRAVENOUS at 23:08

## 2018-10-12 RX ADMIN — CARVEDILOL 25 MG: 25 TABLET, FILM COATED ORAL at 23:07

## 2018-10-12 RX ADMIN — LEVETIRACETAM 750 MG: 500 TABLET, FILM COATED ORAL at 23:07

## 2018-10-12 RX ADMIN — POTASSIUM CHLORIDE AND SODIUM CHLORIDE 125 ML/HR: 900; 150 INJECTION, SOLUTION INTRAVENOUS at 23:06

## 2018-10-12 RX ADMIN — CEFTRIAXONE SODIUM 1 G: 1 INJECTION, SOLUTION INTRAVENOUS at 23:06

## 2018-10-12 RX ADMIN — Medication 3 ML: at 23:08

## 2018-10-12 RX ADMIN — CALCIUM CARBONATE-VITAMIN D TAB 500 MG-200 UNIT 500 MG: 500-200 TAB at 23:07

## 2018-10-13 ENCOUNTER — APPOINTMENT (OUTPATIENT)
Dept: GENERAL RADIOLOGY | Facility: HOSPITAL | Age: 83
End: 2018-10-13

## 2018-10-13 ENCOUNTER — APPOINTMENT (OUTPATIENT)
Dept: NUCLEAR MEDICINE | Facility: HOSPITAL | Age: 83
End: 2018-10-13

## 2018-10-13 PROBLEM — R10.11 RIGHT UPPER QUADRANT ABDOMINAL PAIN: Status: ACTIVE | Noted: 2018-10-12

## 2018-10-13 LAB
ABO + RH BLD: NORMAL
ABO + RH BLD: NORMAL
ALBUMIN SERPL-MCNC: 3.3 G/DL (ref 3.5–5.2)
ALBUMIN/GLOB SERPL: 1 G/DL
ALP SERPL-CCNC: 88 U/L (ref 39–117)
ALT SERPL W P-5'-P-CCNC: 94 U/L (ref 1–41)
ANION GAP SERPL CALCULATED.3IONS-SCNC: 12.2 MMOL/L
AST SERPL-CCNC: 106 U/L (ref 1–40)
BACTERIA UR QL AUTO: ABNORMAL /HPF
BASOPHILS # BLD AUTO: 0.01 10*3/MM3 (ref 0–0.2)
BASOPHILS NFR BLD AUTO: 0.2 % (ref 0–1.5)
BH BB BLOOD EXPIRATION DATE: NORMAL
BH BB BLOOD EXPIRATION DATE: NORMAL
BH BB BLOOD TYPE BARCODE: 600
BH BB BLOOD TYPE BARCODE: 600
BH BB DISPENSE STATUS: NORMAL
BH BB DISPENSE STATUS: NORMAL
BH BB PRODUCT CODE: NORMAL
BH BB PRODUCT CODE: NORMAL
BH BB UNIT NUMBER: NORMAL
BH BB UNIT NUMBER: NORMAL
BILIRUB SERPL-MCNC: 0.6 MG/DL (ref 0.1–1.2)
BILIRUB UR QL STRIP: NEGATIVE
BUN BLD-MCNC: 29 MG/DL (ref 8–23)
BUN/CREAT SERPL: 28.7 (ref 7–25)
CALCIUM SPEC-SCNC: 9.3 MG/DL (ref 8.6–10.5)
CHLORIDE SERPL-SCNC: 101 MMOL/L (ref 98–107)
CLARITY UR: CLEAR
CO2 SERPL-SCNC: 22.8 MMOL/L (ref 22–29)
COLOR UR: YELLOW
CREAT BLD-MCNC: 1.01 MG/DL (ref 0.76–1.27)
D-LACTATE SERPL-SCNC: 1.3 MMOL/L (ref 0.5–2)
DEPRECATED RDW RBC AUTO: 49.8 FL (ref 37–54)
EOSINOPHIL # BLD AUTO: 0.13 10*3/MM3 (ref 0–0.7)
EOSINOPHIL NFR BLD AUTO: 2.4 % (ref 0.3–6.2)
ERYTHROCYTE [DISTWIDTH] IN BLOOD BY AUTOMATED COUNT: 13.3 % (ref 11.5–14.5)
GFR SERPL CREATININE-BSD FRML MDRD: 70 ML/MIN/1.73
GLOBULIN UR ELPH-MCNC: 3.2 GM/DL
GLUCOSE BLD-MCNC: 104 MG/DL (ref 65–99)
GLUCOSE BLDC GLUCOMTR-MCNC: 107 MG/DL (ref 70–130)
GLUCOSE BLDC GLUCOMTR-MCNC: 109 MG/DL (ref 70–130)
GLUCOSE BLDC GLUCOMTR-MCNC: 144 MG/DL (ref 70–130)
GLUCOSE BLDC GLUCOMTR-MCNC: 92 MG/DL (ref 70–130)
GLUCOSE UR STRIP-MCNC: NEGATIVE MG/DL
HCT VFR BLD AUTO: 32.8 % (ref 40.4–52.2)
HGB BLD-MCNC: 10.5 G/DL (ref 13.7–17.6)
HGB UR QL STRIP.AUTO: ABNORMAL
HOLD SPECIMEN: NORMAL
HYALINE CASTS UR QL AUTO: ABNORMAL /LPF
IMM GRANULOCYTES # BLD: 0.02 10*3/MM3 (ref 0–0.03)
IMM GRANULOCYTES NFR BLD: 0.4 % (ref 0–0.5)
INR PPP: 3.07 (ref 0.9–1.1)
KETONES UR QL STRIP: NEGATIVE
LEUKOCYTE ESTERASE UR QL STRIP.AUTO: ABNORMAL
LYMPHOCYTES # BLD AUTO: 0.88 10*3/MM3 (ref 0.9–4.8)
LYMPHOCYTES NFR BLD AUTO: 16.3 % (ref 19.6–45.3)
MAGNESIUM SERPL-MCNC: 1.8 MG/DL (ref 1.6–2.4)
MCH RBC QN AUTO: 32.4 PG (ref 27–32.7)
MCHC RBC AUTO-ENTMCNC: 32 G/DL (ref 32.6–36.4)
MCV RBC AUTO: 101.2 FL (ref 79.8–96.2)
MONOCYTES # BLD AUTO: 0.59 10*3/MM3 (ref 0.2–1.2)
MONOCYTES NFR BLD AUTO: 10.9 % (ref 5–12)
NEUTROPHILS # BLD AUTO: 3.78 10*3/MM3 (ref 1.9–8.1)
NEUTROPHILS NFR BLD AUTO: 69.8 % (ref 42.7–76)
NITRITE UR QL STRIP: NEGATIVE
PH UR STRIP.AUTO: <=5 [PH] (ref 5–8)
PLATELET # BLD AUTO: 148 10*3/MM3 (ref 140–500)
PMV BLD AUTO: 10.9 FL (ref 6–12)
POTASSIUM BLD-SCNC: 4.6 MMOL/L (ref 3.5–5.2)
PROT SERPL-MCNC: 6.5 G/DL (ref 6–8.5)
PROT UR QL STRIP: NEGATIVE
PROTHROMBIN TIME: 31.2 SECONDS (ref 11.7–14.2)
RBC # BLD AUTO: 3.24 10*6/MM3 (ref 4.6–6)
RBC # UR: ABNORMAL /HPF
REF LAB TEST METHOD: ABNORMAL
SODIUM BLD-SCNC: 136 MMOL/L (ref 136–145)
SP GR UR STRIP: >=1.03 (ref 1–1.03)
SQUAMOUS #/AREA URNS HPF: ABNORMAL /HPF
UNIT  ABO: NORMAL
UNIT  ABO: NORMAL
UNIT  RH: NORMAL
UNIT  RH: NORMAL
UROBILINOGEN UR QL STRIP: ABNORMAL
WBC NRBC COR # BLD: 5.41 10*3/MM3 (ref 4.5–10.7)
WBC UR QL AUTO: ABNORMAL /HPF

## 2018-10-13 PROCEDURE — A9537 TC99M MEBROFENIN: HCPCS | Performed by: INTERNAL MEDICINE

## 2018-10-13 PROCEDURE — 78226 HEPATOBILIARY SYSTEM IMAGING: CPT

## 2018-10-13 PROCEDURE — 81001 URINALYSIS AUTO W/SCOPE: CPT | Performed by: EMERGENCY MEDICINE

## 2018-10-13 PROCEDURE — P9017 PLASMA 1 DONOR FRZ W/IN 8 HR: HCPCS

## 2018-10-13 PROCEDURE — 99222 1ST HOSP IP/OBS MODERATE 55: CPT | Performed by: SURGERY

## 2018-10-13 PROCEDURE — 86927 PLASMA FRESH FROZEN: CPT

## 2018-10-13 PROCEDURE — 0 TECHNETIUM TC 99M MEBROFENIN KIT: Performed by: INTERNAL MEDICINE

## 2018-10-13 PROCEDURE — 83605 ASSAY OF LACTIC ACID: CPT | Performed by: EMERGENCY MEDICINE

## 2018-10-13 PROCEDURE — 25010000002 CEFTRIAXONE PER 250 MG: Performed by: INTERNAL MEDICINE

## 2018-10-13 PROCEDURE — 83735 ASSAY OF MAGNESIUM: CPT | Performed by: INTERNAL MEDICINE

## 2018-10-13 PROCEDURE — 90661 CCIIV3 VAC ABX FR 0.5 ML IM: CPT | Performed by: INTERNAL MEDICINE

## 2018-10-13 PROCEDURE — 25810000003 SODIUM CHLORIDE 0.9 % WITH KCL 20 MEQ 20-0.9 MEQ/L-% SOLUTION: Performed by: INTERNAL MEDICINE

## 2018-10-13 PROCEDURE — 74019 RADEX ABDOMEN 2 VIEWS: CPT

## 2018-10-13 PROCEDURE — 25010000002 INFLUENZA VAC SUBUNIT QUAD 0.5 ML SUSPENSION PREFILLED SYRINGE: Performed by: INTERNAL MEDICINE

## 2018-10-13 PROCEDURE — 80053 COMPREHEN METABOLIC PANEL: CPT | Performed by: INTERNAL MEDICINE

## 2018-10-13 PROCEDURE — 85610 PROTHROMBIN TIME: CPT | Performed by: INTERNAL MEDICINE

## 2018-10-13 PROCEDURE — 36430 TRANSFUSION BLD/BLD COMPNT: CPT

## 2018-10-13 PROCEDURE — G0008 ADMIN INFLUENZA VIRUS VAC: HCPCS | Performed by: INTERNAL MEDICINE

## 2018-10-13 PROCEDURE — 82962 GLUCOSE BLOOD TEST: CPT

## 2018-10-13 PROCEDURE — 85025 COMPLETE CBC W/AUTO DIFF WBC: CPT | Performed by: INTERNAL MEDICINE

## 2018-10-13 RX ORDER — KIT FOR THE PREPARATION OF TECHNETIUM TC 99M MEBROFENIN 45 MG/10ML
1 INJECTION, POWDER, LYOPHILIZED, FOR SOLUTION INTRAVENOUS
Status: COMPLETED | OUTPATIENT
Start: 2018-10-13 | End: 2018-10-13

## 2018-10-13 RX ADMIN — CALCIUM CARBONATE-VITAMIN D TAB 500 MG-200 UNIT 500 MG: 500-200 TAB at 11:32

## 2018-10-13 RX ADMIN — CALCIUM CARBONATE-VITAMIN D TAB 500 MG-200 UNIT 500 MG: 500-200 TAB at 20:45

## 2018-10-13 RX ADMIN — POTASSIUM CHLORIDE AND SODIUM CHLORIDE 125 ML/HR: 900; 150 INJECTION, SOLUTION INTRAVENOUS at 20:55

## 2018-10-13 RX ADMIN — ATORVASTATIN CALCIUM 20 MG: 20 TABLET, FILM COATED ORAL at 11:33

## 2018-10-13 RX ADMIN — Medication 3 ML: at 11:47

## 2018-10-13 RX ADMIN — CEFTRIAXONE SODIUM 1 G: 1 INJECTION, SOLUTION INTRAVENOUS at 22:56

## 2018-10-13 RX ADMIN — MEBROFENIN 1 DOSE: 45 INJECTION, POWDER, LYOPHILIZED, FOR SOLUTION INTRAVENOUS at 12:24

## 2018-10-13 RX ADMIN — CARVEDILOL 25 MG: 25 TABLET, FILM COATED ORAL at 11:33

## 2018-10-13 RX ADMIN — METRONIDAZOLE 500 MG: 500 INJECTION, SOLUTION INTRAVENOUS at 15:04

## 2018-10-13 RX ADMIN — Medication 3 ML: at 20:46

## 2018-10-13 RX ADMIN — CARVEDILOL 25 MG: 25 TABLET, FILM COATED ORAL at 20:46

## 2018-10-13 RX ADMIN — LEVETIRACETAM 750 MG: 500 TABLET, FILM COATED ORAL at 11:33

## 2018-10-13 RX ADMIN — LEVETIRACETAM 750 MG: 500 TABLET, FILM COATED ORAL at 20:45

## 2018-10-13 RX ADMIN — METRONIDAZOLE 500 MG: 500 INJECTION, SOLUTION INTRAVENOUS at 05:50

## 2018-10-13 RX ADMIN — POTASSIUM CHLORIDE AND SODIUM CHLORIDE 125 ML/HR: 900; 150 INJECTION, SOLUTION INTRAVENOUS at 11:32

## 2018-10-13 RX ADMIN — FOLIC ACID 1 MG: 1 TABLET ORAL at 11:33

## 2018-10-13 RX ADMIN — INFLUENZA A VIRUS A/SINGAPORE/GP1908/2015 IVR-180 (H1N1) ANTIGEN (MDCK CELL DERIVED, PROPIOLACTONE INACTIVATED), INFLUENZA A VIRUS A/NORTH CAROLINA/04/2016 (H3N2) HEMAGGLUTININ ANTIGEN (MDCK CELL DERIVED, PROPIOLACTONE INACTIVATED), INFLUENZA B VIRUS B/IOWA/06/2017 HEMAGGLUTININ ANTIGEN (MDCK CELL DERIVED, PROPIOLACTONE INACTIVATED), INFLUENZA B VIRUS B/SINGAPORE/INFTT-16-0610/2016 HEMAGGLUTININ ANTIGEN (MDCK CELL DERIVED, PROPIOLACTONE INACTIVATED) 0.5 ML: 15; 15; 15; 15 INJECTION, SUSPENSION INTRAMUSCULAR at 11:34

## 2018-10-13 RX ADMIN — METRONIDAZOLE 500 MG: 500 INJECTION, SOLUTION INTRAVENOUS at 22:57

## 2018-10-14 VITALS
OXYGEN SATURATION: 93 % | HEART RATE: 91 BPM | BODY MASS INDEX: 32.16 KG/M2 | RESPIRATION RATE: 16 BRPM | SYSTOLIC BLOOD PRESSURE: 145 MMHG | DIASTOLIC BLOOD PRESSURE: 81 MMHG | WEIGHT: 224.6 LBS | TEMPERATURE: 97.3 F | HEIGHT: 70 IN

## 2018-10-14 LAB
ANION GAP SERPL CALCULATED.3IONS-SCNC: 12.7 MMOL/L
BASOPHILS # BLD AUTO: 0.01 10*3/MM3 (ref 0–0.2)
BASOPHILS NFR BLD AUTO: 0.2 % (ref 0–1.5)
BUN BLD-MCNC: 16 MG/DL (ref 8–23)
BUN/CREAT SERPL: 17.8 (ref 7–25)
CALCIUM SPEC-SCNC: 8.9 MG/DL (ref 8.6–10.5)
CHLORIDE SERPL-SCNC: 106 MMOL/L (ref 98–107)
CO2 SERPL-SCNC: 18.3 MMOL/L (ref 22–29)
CREAT BLD-MCNC: 0.9 MG/DL (ref 0.76–1.27)
DEPRECATED RDW RBC AUTO: 52.1 FL (ref 37–54)
EOSINOPHIL # BLD AUTO: 0.06 10*3/MM3 (ref 0–0.7)
EOSINOPHIL NFR BLD AUTO: 1.2 % (ref 0.3–6.2)
ERYTHROCYTE [DISTWIDTH] IN BLOOD BY AUTOMATED COUNT: 13.7 % (ref 11.5–14.5)
GFR SERPL CREATININE-BSD FRML MDRD: 80 ML/MIN/1.73
GLUCOSE BLD-MCNC: 112 MG/DL (ref 65–99)
GLUCOSE BLDC GLUCOMTR-MCNC: 111 MG/DL (ref 70–130)
GLUCOSE BLDC GLUCOMTR-MCNC: 114 MG/DL (ref 70–130)
HCT VFR BLD AUTO: 32.5 % (ref 40.4–52.2)
HGB BLD-MCNC: 10.3 G/DL (ref 13.7–17.6)
IMM GRANULOCYTES # BLD: 0.02 10*3/MM3 (ref 0–0.03)
IMM GRANULOCYTES NFR BLD: 0.4 % (ref 0–0.5)
INR PPP: 3.43 (ref 0.9–1.1)
LYMPHOCYTES # BLD AUTO: 0.66 10*3/MM3 (ref 0.9–4.8)
LYMPHOCYTES NFR BLD AUTO: 12.8 % (ref 19.6–45.3)
MCH RBC QN AUTO: 33 PG (ref 27–32.7)
MCHC RBC AUTO-ENTMCNC: 31.7 G/DL (ref 32.6–36.4)
MCV RBC AUTO: 104.2 FL (ref 79.8–96.2)
MONOCYTES # BLD AUTO: 0.66 10*3/MM3 (ref 0.2–1.2)
MONOCYTES NFR BLD AUTO: 12.8 % (ref 5–12)
NEUTROPHILS # BLD AUTO: 3.77 10*3/MM3 (ref 1.9–8.1)
NEUTROPHILS NFR BLD AUTO: 73 % (ref 42.7–76)
NRBC BLD MANUAL-RTO: 0 /100 WBC (ref 0–0)
PLATELET # BLD AUTO: 127 10*3/MM3 (ref 140–500)
PMV BLD AUTO: 10.6 FL (ref 6–12)
POTASSIUM BLD-SCNC: 5.2 MMOL/L (ref 3.5–5.2)
PROTHROMBIN TIME: 34 SECONDS (ref 11.7–14.2)
RBC # BLD AUTO: 3.12 10*6/MM3 (ref 4.6–6)
SODIUM BLD-SCNC: 137 MMOL/L (ref 136–145)
WBC NRBC COR # BLD: 5.16 10*3/MM3 (ref 4.5–10.7)

## 2018-10-14 PROCEDURE — 82962 GLUCOSE BLOOD TEST: CPT

## 2018-10-14 PROCEDURE — 80048 BASIC METABOLIC PNL TOTAL CA: CPT | Performed by: INTERNAL MEDICINE

## 2018-10-14 PROCEDURE — 85610 PROTHROMBIN TIME: CPT | Performed by: INTERNAL MEDICINE

## 2018-10-14 PROCEDURE — 85025 COMPLETE CBC W/AUTO DIFF WBC: CPT | Performed by: INTERNAL MEDICINE

## 2018-10-14 PROCEDURE — 99231 SBSQ HOSP IP/OBS SF/LOW 25: CPT | Performed by: SURGERY

## 2018-10-14 RX ORDER — AMOXICILLIN AND CLAVULANATE POTASSIUM 875; 125 MG/1; MG/1
1 TABLET, FILM COATED ORAL EVERY 12 HOURS SCHEDULED
Qty: 28 TABLET | Refills: 0 | Status: SHIPPED | OUTPATIENT
Start: 2018-10-14 | End: 2018-10-28

## 2018-10-14 RX ADMIN — CALCIUM CARBONATE-VITAMIN D TAB 500 MG-200 UNIT 500 MG: 500-200 TAB at 09:48

## 2018-10-14 RX ADMIN — METRONIDAZOLE 500 MG: 500 INJECTION, SOLUTION INTRAVENOUS at 05:55

## 2018-10-14 RX ADMIN — LEVETIRACETAM 750 MG: 500 TABLET, FILM COATED ORAL at 09:48

## 2018-10-14 RX ADMIN — ATORVASTATIN CALCIUM 20 MG: 20 TABLET, FILM COATED ORAL at 09:48

## 2018-10-14 RX ADMIN — CARVEDILOL 25 MG: 25 TABLET, FILM COATED ORAL at 09:48

## 2018-10-14 RX ADMIN — Medication 3 ML: at 09:00

## 2018-10-14 RX ADMIN — FOLIC ACID 1 MG: 1 TABLET ORAL at 09:48

## 2018-10-15 ENCOUNTER — TRANSITIONAL CARE MANAGEMENT TELEPHONE ENCOUNTER (OUTPATIENT)
Dept: INTERNAL MEDICINE | Facility: CLINIC | Age: 83
End: 2018-10-15

## 2018-10-15 ENCOUNTER — READMISSION MANAGEMENT (OUTPATIENT)
Dept: CALL CENTER | Facility: HOSPITAL | Age: 83
End: 2018-10-15

## 2018-10-16 LAB
APPEARANCE UR: ABNORMAL
BACTERIA #/AREA URNS HPF: ABNORMAL /HPF
BACTERIA UR CULT: ABNORMAL
BACTERIA UR CULT: ABNORMAL
BILIRUB UR QL STRIP: NEGATIVE
CASTS URNS MICRO: ABNORMAL
CASTS URNS QL MICRO: PRESENT /LPF
COLOR UR: YELLOW
CRYSTALS URNS MICRO: ABNORMAL
EPI CELLS #/AREA URNS HPF: ABNORMAL /HPF
GLUCOSE UR QL: NEGATIVE
HGB UR QL STRIP: ABNORMAL
KETONES UR QL STRIP: ABNORMAL
LEUKOCYTE ESTERASE UR QL STRIP: ABNORMAL
MICRO URNS: ABNORMAL
MUCOUS THREADS URNS QL MICRO: PRESENT /HPF
NITRITE UR QL STRIP: NEGATIVE
OTHER ANTIBIOTIC SUSC ISLT: ABNORMAL
PH UR STRIP: 5.5 [PH] (ref 5–7.5)
PROT UR QL STRIP: ABNORMAL
RBC #/AREA URNS HPF: ABNORMAL /HPF
SP GR UR: 1.02 (ref 1–1.03)
UNIDENT CRYS URNS QL MICRO: PRESENT /LPF
URINALYSIS REFLEX: ABNORMAL
UROBILINOGEN UR STRIP-MCNC: 1 MG/DL (ref 0.2–1)
WBC #/AREA URNS HPF: >30 /HPF

## 2018-10-17 LAB
BACTERIA SPEC AEROBE CULT: NORMAL
BACTERIA SPEC AEROBE CULT: NORMAL

## 2018-10-18 ENCOUNTER — OFFICE VISIT (OUTPATIENT)
Dept: INTERNAL MEDICINE | Facility: CLINIC | Age: 83
End: 2018-10-18

## 2018-10-18 ENCOUNTER — ANTICOAGULATION VISIT (OUTPATIENT)
Dept: PHARMACY | Facility: HOSPITAL | Age: 83
End: 2018-10-18

## 2018-10-18 VITALS
DIASTOLIC BLOOD PRESSURE: 72 MMHG | BODY MASS INDEX: 30.64 KG/M2 | HEIGHT: 70 IN | OXYGEN SATURATION: 98 % | WEIGHT: 214 LBS | TEMPERATURE: 97.9 F | SYSTOLIC BLOOD PRESSURE: 116 MMHG | HEART RATE: 60 BPM

## 2018-10-18 DIAGNOSIS — I48.19 PERSISTENT ATRIAL FIBRILLATION (HCC): ICD-10-CM

## 2018-10-18 DIAGNOSIS — D64.9 ACUTE ANEMIA: ICD-10-CM

## 2018-10-18 DIAGNOSIS — R18.8 INTRA-ABDOMINAL FLUID COLLECTION: Primary | ICD-10-CM

## 2018-10-18 DIAGNOSIS — F43.21 GRIEF: ICD-10-CM

## 2018-10-18 DIAGNOSIS — Z79.01 WARFARIN ANTICOAGULATION: ICD-10-CM

## 2018-10-18 PROBLEM — R10.11 RIGHT UPPER QUADRANT ABDOMINAL PAIN: Status: RESOLVED | Noted: 2018-10-12 | Resolved: 2018-10-18

## 2018-10-18 LAB
BASOPHILS # BLD AUTO: 0.02 10*3/MM3 (ref 0–0.2)
BASOPHILS NFR BLD AUTO: 0.4 % (ref 0–1.5)
EOSINOPHIL # BLD AUTO: 0.2 10*3/MM3 (ref 0–0.7)
EOSINOPHIL NFR BLD AUTO: 3.5 % (ref 0.3–6.2)
ERYTHROCYTE [DISTWIDTH] IN BLOOD BY AUTOMATED COUNT: 13.7 % (ref 11.5–14.5)
HCT VFR BLD AUTO: 36.2 % (ref 40.4–52.2)
HGB BLD-MCNC: 11.5 G/DL (ref 13.7–17.6)
IMM GRANULOCYTES # BLD: 0.03 10*3/MM3 (ref 0–0.03)
IMM GRANULOCYTES NFR BLD: 0.5 % (ref 0–0.5)
INR PPP: 1.3 (ref 0.91–1.09)
LYMPHOCYTES # BLD AUTO: 1.16 10*3/MM3 (ref 0.9–4.8)
LYMPHOCYTES NFR BLD AUTO: 20.5 % (ref 19.6–45.3)
MCH RBC QN AUTO: 32.8 PG (ref 27–32.7)
MCHC RBC AUTO-ENTMCNC: 31.8 G/DL (ref 32.6–36.4)
MCV RBC AUTO: 103.1 FL (ref 79.8–96.2)
MONOCYTES # BLD AUTO: 0.67 10*3/MM3 (ref 0.2–1.2)
MONOCYTES NFR BLD AUTO: 11.9 % (ref 5–12)
NEUTROPHILS # BLD AUTO: 3.6 10*3/MM3 (ref 1.9–8.1)
NEUTROPHILS NFR BLD AUTO: 63.7 % (ref 42.7–76)
PLATELET # BLD AUTO: 326 10*3/MM3 (ref 140–500)
PROTHROMBIN TIME: 15.3 SECONDS (ref 10–13.8)
RBC # BLD AUTO: 3.51 10*6/MM3 (ref 4.6–6)
WBC # BLD AUTO: 5.65 10*3/MM3 (ref 4.5–10.7)

## 2018-10-18 PROCEDURE — 99495 TRANSJ CARE MGMT MOD F2F 14D: CPT | Performed by: INTERNAL MEDICINE

## 2018-10-18 PROCEDURE — 36416 COLLJ CAPILLARY BLOOD SPEC: CPT

## 2018-10-18 PROCEDURE — 85610 PROTHROMBIN TIME: CPT

## 2018-10-18 PROCEDURE — G0463 HOSPITAL OUTPT CLINIC VISIT: HCPCS | Performed by: PHARMACIST

## 2018-10-18 RX ORDER — WARFARIN SODIUM 5 MG/1
TABLET ORAL
Qty: 90 TABLET | Refills: 0 | Status: SHIPPED | OUTPATIENT
Start: 2018-10-18 | End: 2018-12-05

## 2018-10-18 RX ORDER — WARFARIN SODIUM 5 MG/1
5 TABLET ORAL
COMMUNITY
End: 2018-12-05

## 2018-10-19 ENCOUNTER — READMISSION MANAGEMENT (OUTPATIENT)
Dept: CALL CENTER | Facility: HOSPITAL | Age: 83
End: 2018-10-19

## 2018-10-25 ENCOUNTER — APPOINTMENT (OUTPATIENT)
Dept: PHARMACY | Facility: HOSPITAL | Age: 83
End: 2018-10-25

## 2018-10-25 ENCOUNTER — READMISSION MANAGEMENT (OUTPATIENT)
Dept: CALL CENTER | Facility: HOSPITAL | Age: 83
End: 2018-10-25

## 2018-10-29 ENCOUNTER — ANTICOAGULATION VISIT (OUTPATIENT)
Dept: PHARMACY | Facility: HOSPITAL | Age: 83
End: 2018-10-29

## 2018-10-29 ENCOUNTER — TELEPHONE (OUTPATIENT)
Dept: PHARMACY | Facility: HOSPITAL | Age: 83
End: 2018-10-29

## 2018-10-29 DIAGNOSIS — I48.19 PERSISTENT ATRIAL FIBRILLATION (HCC): ICD-10-CM

## 2018-10-29 LAB
INR PPP: 1.7 (ref 0.91–1.09)
PROTHROMBIN TIME: 20.5 SECONDS (ref 10–13.8)

## 2018-10-29 PROCEDURE — 85610 PROTHROMBIN TIME: CPT

## 2018-10-29 PROCEDURE — 36416 COLLJ CAPILLARY BLOOD SPEC: CPT

## 2018-10-29 PROCEDURE — G0463 HOSPITAL OUTPT CLINIC VISIT: HCPCS | Performed by: PHARMACIST

## 2018-11-01 ENCOUNTER — READMISSION MANAGEMENT (OUTPATIENT)
Dept: CALL CENTER | Facility: HOSPITAL | Age: 83
End: 2018-11-01

## 2018-11-19 ENCOUNTER — CLINICAL SUPPORT NO REQUIREMENTS (OUTPATIENT)
Dept: CARDIOLOGY | Facility: CLINIC | Age: 83
End: 2018-11-19

## 2018-11-19 DIAGNOSIS — I47.20 VENTRICULAR TACHYCARDIA (HCC): Primary | ICD-10-CM

## 2018-11-19 PROCEDURE — 93296 REM INTERROG EVL PM/IDS: CPT | Performed by: INTERNAL MEDICINE

## 2018-11-19 PROCEDURE — 93295 DEV INTERROG REMOTE 1/2/MLT: CPT | Performed by: INTERNAL MEDICINE

## 2018-11-20 ENCOUNTER — TELEPHONE (OUTPATIENT)
Dept: CARDIOLOGY | Facility: CLINIC | Age: 83
End: 2018-11-20

## 2018-12-04 ENCOUNTER — TELEPHONE (OUTPATIENT)
Dept: PHARMACY | Facility: HOSPITAL | Age: 83
End: 2018-12-04

## 2018-12-05 ENCOUNTER — ANTICOAGULATION VISIT (OUTPATIENT)
Dept: PHARMACY | Facility: HOSPITAL | Age: 83
End: 2018-12-05

## 2018-12-05 LAB
INR PPP: 1.9 (ref 0.91–1.09)
PROTHROMBIN TIME: 23.4 SECONDS (ref 10–13.8)

## 2018-12-05 PROCEDURE — 85610 PROTHROMBIN TIME: CPT

## 2018-12-05 PROCEDURE — 36416 COLLJ CAPILLARY BLOOD SPEC: CPT

## 2018-12-05 RX ORDER — WARFARIN SODIUM 5 MG/1
TABLET ORAL
Qty: 90 TABLET | Refills: 0 | Status: SHIPPED | OUTPATIENT
Start: 2018-12-05 | End: 2019-04-05 | Stop reason: SDUPTHER

## 2018-12-07 DIAGNOSIS — R80.9 CONTROLLED TYPE 2 DIABETES MELLITUS WITH MICROALBUMINURIA, WITHOUT LONG-TERM CURRENT USE OF INSULIN (HCC): ICD-10-CM

## 2018-12-07 DIAGNOSIS — I10 ESSENTIAL HYPERTENSION: Primary | ICD-10-CM

## 2018-12-07 DIAGNOSIS — E11.29 CONTROLLED TYPE 2 DIABETES MELLITUS WITH MICROALBUMINURIA, WITHOUT LONG-TERM CURRENT USE OF INSULIN (HCC): ICD-10-CM

## 2018-12-14 ENCOUNTER — OFFICE VISIT (OUTPATIENT)
Dept: INTERNAL MEDICINE | Facility: CLINIC | Age: 83
End: 2018-12-14

## 2018-12-14 VITALS
HEART RATE: 81 BPM | OXYGEN SATURATION: 98 % | SYSTOLIC BLOOD PRESSURE: 114 MMHG | BODY MASS INDEX: 31.07 KG/M2 | TEMPERATURE: 98.3 F | WEIGHT: 217 LBS | DIASTOLIC BLOOD PRESSURE: 80 MMHG | HEIGHT: 70 IN

## 2018-12-14 DIAGNOSIS — R74.8 ELEVATED LIVER ENZYMES: ICD-10-CM

## 2018-12-14 DIAGNOSIS — Z87.898 H/O ABDOMINAL ABSCESS: ICD-10-CM

## 2018-12-14 DIAGNOSIS — I10 ESSENTIAL HYPERTENSION: Primary | ICD-10-CM

## 2018-12-14 DIAGNOSIS — N30.00 ACUTE CYSTITIS WITHOUT HEMATURIA: ICD-10-CM

## 2018-12-14 DIAGNOSIS — R80.9 CONTROLLED TYPE 2 DIABETES MELLITUS WITH MICROALBUMINURIA, WITHOUT LONG-TERM CURRENT USE OF INSULIN (HCC): ICD-10-CM

## 2018-12-14 DIAGNOSIS — E11.29 CONTROLLED TYPE 2 DIABETES MELLITUS WITH MICROALBUMINURIA, WITHOUT LONG-TERM CURRENT USE OF INSULIN (HCC): ICD-10-CM

## 2018-12-14 DIAGNOSIS — R41.3 MEMORY LOSS: ICD-10-CM

## 2018-12-14 LAB
ALBUMIN SERPL-MCNC: 4 G/DL (ref 3.5–5.2)
ALBUMIN/GLOB SERPL: 1.5 G/DL
ALP SERPL-CCNC: 107 U/L (ref 39–117)
ALT SERPL-CCNC: 145 U/L (ref 1–41)
APPEARANCE UR: CLEAR
AST SERPL-CCNC: 88 U/L (ref 1–40)
BACTERIA #/AREA URNS HPF: ABNORMAL /HPF
BACTERIA UR CULT: ABNORMAL
BACTERIA UR CULT: ABNORMAL
BASOPHILS # BLD AUTO: 0.01 10*3/MM3 (ref 0–0.2)
BASOPHILS NFR BLD AUTO: 0.2 % (ref 0–1.5)
BILIRUB SERPL-MCNC: 0.4 MG/DL (ref 0.1–1.2)
BILIRUB UR QL STRIP: NEGATIVE
BUN SERPL-MCNC: 19 MG/DL (ref 8–23)
BUN/CREAT SERPL: 20.7 (ref 7–25)
CALCIUM SERPL-MCNC: 10.1 MG/DL (ref 8.6–10.5)
CASTS URNS MICRO: ABNORMAL
CASTS URNS QL MICRO: PRESENT /LPF
CHLORIDE SERPL-SCNC: 101 MMOL/L (ref 98–107)
CO2 SERPL-SCNC: 28.4 MMOL/L (ref 22–29)
COLOR UR: YELLOW
CREAT SERPL-MCNC: 0.92 MG/DL (ref 0.76–1.27)
CRYSTALS URNS MICRO: ABNORMAL
EOSINOPHIL # BLD AUTO: 0.24 10*3/MM3 (ref 0–0.7)
EOSINOPHIL NFR BLD AUTO: 4.6 % (ref 0.3–6.2)
EPI CELLS #/AREA URNS HPF: ABNORMAL /HPF
ERYTHROCYTE [DISTWIDTH] IN BLOOD BY AUTOMATED COUNT: 13.7 % (ref 11.5–14.5)
GLOBULIN SER CALC-MCNC: 2.6 GM/DL
GLUCOSE SERPL-MCNC: 80 MG/DL (ref 65–99)
GLUCOSE UR QL: NEGATIVE
HBA1C MFR BLD: 5.7 % (ref 4.8–5.6)
HCT VFR BLD AUTO: 38.4 % (ref 40.4–52.2)
HGB BLD-MCNC: 12.6 G/DL (ref 13.7–17.6)
HGB UR QL STRIP: NEGATIVE
IMM GRANULOCYTES # BLD: 0.01 10*3/MM3 (ref 0–0.03)
IMM GRANULOCYTES NFR BLD: 0.2 % (ref 0–0.5)
KETONES UR QL STRIP: NEGATIVE
LEUKOCYTE ESTERASE UR QL STRIP: ABNORMAL
LYMPHOCYTES # BLD AUTO: 1.35 10*3/MM3 (ref 0.9–4.8)
LYMPHOCYTES NFR BLD AUTO: 25.8 % (ref 19.6–45.3)
MCH RBC QN AUTO: 33.4 PG (ref 27–32.7)
MCHC RBC AUTO-ENTMCNC: 32.8 G/DL (ref 32.6–36.4)
MCV RBC AUTO: 101.9 FL (ref 79.8–96.2)
MICRO URNS: ABNORMAL
MONOCYTES # BLD AUTO: 0.68 10*3/MM3 (ref 0.2–1.2)
MONOCYTES NFR BLD AUTO: 13 % (ref 5–12)
MUCOUS THREADS URNS QL MICRO: PRESENT /HPF
NEUTROPHILS # BLD AUTO: 2.95 10*3/MM3 (ref 1.9–8.1)
NEUTROPHILS NFR BLD AUTO: 56.4 % (ref 42.7–76)
NITRITE UR QL STRIP: POSITIVE
OTHER ANTIBIOTIC SUSC ISLT: ABNORMAL
PH UR STRIP: 5 [PH] (ref 5–7.5)
PLATELET # BLD AUTO: 173 10*3/MM3 (ref 140–500)
POTASSIUM SERPL-SCNC: 4.7 MMOL/L (ref 3.5–5.2)
PROT SERPL-MCNC: 6.6 G/DL (ref 6–8.5)
PROT UR QL STRIP: NEGATIVE
RBC # BLD AUTO: 3.77 10*6/MM3 (ref 4.6–6)
RBC #/AREA URNS HPF: ABNORMAL /HPF
SODIUM SERPL-SCNC: 143 MMOL/L (ref 136–145)
SP GR UR: 1.02 (ref 1–1.03)
UNIDENT CRYS URNS QL MICRO: PRESENT /LPF
URINALYSIS REFLEX: ABNORMAL
UROBILINOGEN UR STRIP-MCNC: 0.2 MG/DL (ref 0.2–1)
WBC # BLD AUTO: 5.23 10*3/MM3 (ref 4.5–10.7)
WBC #/AREA URNS HPF: >30 /HPF

## 2018-12-14 PROCEDURE — 99214 OFFICE O/P EST MOD 30 MIN: CPT | Performed by: INTERNAL MEDICINE

## 2018-12-14 RX ORDER — CIPROFLOXACIN 500 MG/1
500 TABLET, FILM COATED ORAL 2 TIMES DAILY
Qty: 14 TABLET | Refills: 0 | Status: SHIPPED | OUTPATIENT
Start: 2018-12-14 | End: 2018-12-21

## 2018-12-14 RX ORDER — FUROSEMIDE 20 MG/1
10 TABLET ORAL DAILY
Qty: 30 TABLET | Refills: 0 | Status: SHIPPED | OUTPATIENT
Start: 2018-12-14 | End: 2019-02-15 | Stop reason: SDUPTHER

## 2018-12-27 DIAGNOSIS — R39.9 UTI SYMPTOMS: Primary | ICD-10-CM

## 2019-01-02 ENCOUNTER — APPOINTMENT (OUTPATIENT)
Dept: ULTRASOUND IMAGING | Facility: HOSPITAL | Age: 84
End: 2019-01-02

## 2019-01-04 ENCOUNTER — TELEPHONE (OUTPATIENT)
Dept: INTERNAL MEDICINE | Facility: CLINIC | Age: 84
End: 2019-01-04

## 2019-01-04 LAB
APPEARANCE UR: CLEAR
BACTERIA #/AREA URNS HPF: ABNORMAL /HPF
BILIRUB UR QL STRIP: NEGATIVE
CASTS URNS MICRO: ABNORMAL
CASTS URNS QL MICRO: PRESENT /LPF
COLOR UR: YELLOW
CRYSTALS URNS MICRO: ABNORMAL
EPI CELLS #/AREA URNS HPF: ABNORMAL /HPF
GLUCOSE UR QL: NEGATIVE
HGB UR QL STRIP: NEGATIVE
KETONES UR QL STRIP: ABNORMAL
LEUKOCYTE ESTERASE UR QL STRIP: NEGATIVE
MICRO URNS: ABNORMAL
MICRO URNS: ABNORMAL
MUCOUS THREADS URNS QL MICRO: PRESENT /HPF
NITRITE UR QL STRIP: NEGATIVE
PH UR STRIP: 5 [PH] (ref 5–7.5)
PROT UR QL STRIP: NEGATIVE
RBC #/AREA URNS HPF: ABNORMAL /HPF
SP GR UR: >=1.03 (ref 1–1.03)
UNIDENT CRYS URNS QL MICRO: PRESENT /LPF
URINALYSIS REFLEX: ABNORMAL
UROBILINOGEN UR STRIP-MCNC: 0.2 MG/DL (ref 0.2–1)
WBC #/AREA URNS HPF: ABNORMAL /HPF

## 2019-01-09 ENCOUNTER — HOSPITAL ENCOUNTER (OUTPATIENT)
Dept: ULTRASOUND IMAGING | Facility: HOSPITAL | Age: 84
Discharge: HOME | End: 2019-01-09
Admitting: INTERNAL MEDICINE

## 2019-01-09 DIAGNOSIS — Z87.898 H/O ABDOMINAL ABSCESS: ICD-10-CM

## 2019-01-09 DIAGNOSIS — R74.8 ELEVATED LIVER ENZYMES: ICD-10-CM

## 2019-01-09 PROCEDURE — 76705 ECHO EXAM OF ABDOMEN: CPT

## 2019-01-21 DIAGNOSIS — R79.89 ELEVATED LIVER FUNCTION TESTS: Primary | ICD-10-CM

## 2019-01-22 ENCOUNTER — TELEPHONE (OUTPATIENT)
Dept: PHARMACY | Facility: HOSPITAL | Age: 84
End: 2019-01-22

## 2019-02-04 ENCOUNTER — ANTICOAGULATION VISIT (OUTPATIENT)
Dept: PHARMACY | Facility: HOSPITAL | Age: 84
End: 2019-02-04

## 2019-02-04 ENCOUNTER — OFFICE VISIT (OUTPATIENT)
Dept: INTERNAL MEDICINE | Facility: CLINIC | Age: 84
End: 2019-02-04

## 2019-02-04 VITALS
DIASTOLIC BLOOD PRESSURE: 78 MMHG | WEIGHT: 228 LBS | SYSTOLIC BLOOD PRESSURE: 128 MMHG | HEART RATE: 75 BPM | TEMPERATURE: 97.9 F | BODY MASS INDEX: 32.64 KG/M2 | HEIGHT: 70 IN | OXYGEN SATURATION: 97 %

## 2019-02-04 DIAGNOSIS — R19.01 RIGHT UPPER QUADRANT ABDOMINAL MASS: ICD-10-CM

## 2019-02-04 DIAGNOSIS — I10 ESSENTIAL HYPERTENSION: ICD-10-CM

## 2019-02-04 DIAGNOSIS — R79.89 LFT ELEVATION: Primary | ICD-10-CM

## 2019-02-04 DIAGNOSIS — N30.00 ACUTE CYSTITIS WITHOUT HEMATURIA: ICD-10-CM

## 2019-02-04 LAB
INR PPP: 1.6 (ref 0.91–1.09)
PROTHROMBIN TIME: 19.5 SECONDS (ref 10–13.8)

## 2019-02-04 PROCEDURE — G0463 HOSPITAL OUTPT CLINIC VISIT: HCPCS

## 2019-02-04 PROCEDURE — 99213 OFFICE O/P EST LOW 20 MIN: CPT | Performed by: INTERNAL MEDICINE

## 2019-02-04 PROCEDURE — 36416 COLLJ CAPILLARY BLOOD SPEC: CPT

## 2019-02-04 PROCEDURE — 85610 PROTHROMBIN TIME: CPT

## 2019-02-05 LAB
ALBUMIN SERPL-MCNC: 4 G/DL (ref 3.5–5.2)
ALP SERPL-CCNC: 254 U/L (ref 39–117)
ALT SERPL-CCNC: 569 U/L (ref 1–41)
APPEARANCE UR: CLEAR
AST SERPL-CCNC: 589 U/L (ref 1–40)
BACTERIA #/AREA URNS HPF: ABNORMAL /HPF
BILIRUB DIRECT SERPL-MCNC: 0.3 MG/DL (ref 0–0.3)
BILIRUB SERPL-MCNC: 0.7 MG/DL (ref 0.1–1.2)
BILIRUB UR QL STRIP: NEGATIVE
COLOR UR: YELLOW
CRYSTALS URNS MICRO: ABNORMAL
EPI CELLS #/AREA URNS HPF: ABNORMAL /HPF
GLUCOSE UR QL: NEGATIVE
HGB UR QL STRIP: NEGATIVE
KETONES UR QL STRIP: ABNORMAL
LEUKOCYTE ESTERASE UR QL STRIP: NEGATIVE
MICRO URNS: ABNORMAL
MICRO URNS: ABNORMAL
MUCOUS THREADS URNS QL MICRO: PRESENT /HPF
NITRITE UR QL STRIP: NEGATIVE
PH UR STRIP: 5.5 [PH] (ref 5–7.5)
PROT SERPL-MCNC: 6.4 G/DL (ref 6–8.5)
PROT UR QL STRIP: NEGATIVE
RBC #/AREA URNS HPF: ABNORMAL /HPF
SP GR UR: 1.02 (ref 1–1.03)
UNIDENT CRYS URNS QL MICRO: PRESENT /LPF
URINALYSIS REFLEX: ABNORMAL
UROBILINOGEN UR STRIP-MCNC: 1 MG/DL (ref 0.2–1)
WBC #/AREA URNS HPF: ABNORMAL /HPF

## 2019-02-05 RX ORDER — CARVEDILOL 25 MG/1
TABLET ORAL
Qty: 180 TABLET | Refills: 2 | Status: SHIPPED | OUTPATIENT
Start: 2019-02-05 | End: 2019-05-14 | Stop reason: SDUPTHER

## 2019-02-08 DIAGNOSIS — R74.8 LIVER ENZYME ELEVATION: ICD-10-CM

## 2019-02-08 DIAGNOSIS — K65.1 RIGHT UPPER QUADRANT ABDOMINAL ABSCESS (HCC): Primary | ICD-10-CM

## 2019-02-15 RX ORDER — FUROSEMIDE 20 MG/1
TABLET ORAL
Qty: 30 TABLET | Refills: 0 | Status: SHIPPED | OUTPATIENT
Start: 2019-02-15 | End: 2019-04-22 | Stop reason: SDUPTHER

## 2019-02-18 ENCOUNTER — HOSPITAL ENCOUNTER (OUTPATIENT)
Dept: CT IMAGING | Facility: HOSPITAL | Age: 84
Discharge: HOME | End: 2019-02-18
Admitting: INTERNAL MEDICINE

## 2019-02-18 DIAGNOSIS — R74.8 LIVER ENZYME ELEVATION: ICD-10-CM

## 2019-02-18 DIAGNOSIS — K65.1 RIGHT UPPER QUADRANT ABDOMINAL ABSCESS (HCC): ICD-10-CM

## 2019-02-18 LAB — CREAT BLDA-MCNC: 0.9 MG/DL (ref 0.6–1.3)

## 2019-02-18 PROCEDURE — 25010000002 IOPAMIDOL 61 % SOLUTION: Performed by: INTERNAL MEDICINE

## 2019-02-18 PROCEDURE — 74177 CT ABD & PELVIS W/CONTRAST: CPT

## 2019-02-18 PROCEDURE — 82565 ASSAY OF CREATININE: CPT

## 2019-02-18 RX ADMIN — IOPAMIDOL 85 ML: 612 INJECTION, SOLUTION INTRAVENOUS at 16:10

## 2019-02-19 ENCOUNTER — CLINICAL SUPPORT NO REQUIREMENTS (OUTPATIENT)
Dept: CARDIOLOGY | Facility: CLINIC | Age: 84
End: 2019-02-19

## 2019-02-19 ENCOUNTER — ANTICOAGULATION VISIT (OUTPATIENT)
Dept: PHARMACY | Facility: HOSPITAL | Age: 84
End: 2019-02-19

## 2019-02-19 DIAGNOSIS — I47.20 VENTRICULAR TACHYCARDIA (HCC): Primary | ICD-10-CM

## 2019-02-19 LAB
INR PPP: 1.5 (ref 0.91–1.09)
PROTHROMBIN TIME: 17.8 SECONDS (ref 10–13.8)

## 2019-02-19 PROCEDURE — G0463 HOSPITAL OUTPT CLINIC VISIT: HCPCS

## 2019-02-19 PROCEDURE — 93283 PRGRMG EVAL IMPLANTABLE DFB: CPT | Performed by: INTERNAL MEDICINE

## 2019-02-19 PROCEDURE — 85610 PROTHROMBIN TIME: CPT

## 2019-02-19 PROCEDURE — 36416 COLLJ CAPILLARY BLOOD SPEC: CPT

## 2019-03-01 DIAGNOSIS — R74.8 LIVER ENZYME ELEVATION: Primary | ICD-10-CM

## 2019-03-02 LAB
ALBUMIN SERPL-MCNC: 3.9 G/DL (ref 3.5–5.2)
ALP SERPL-CCNC: 156 U/L (ref 39–117)
ALT SERPL-CCNC: 137 U/L (ref 1–41)
AST SERPL-CCNC: 57 U/L (ref 1–40)
BILIRUB DIRECT SERPL-MCNC: <0.2 MG/DL (ref 0–0.3)
BILIRUB SERPL-MCNC: 0.4 MG/DL (ref 0.1–1.2)
FERRITIN SERPL-MCNC: 251 NG/ML (ref 30–400)
HAV IGM SERPL QL IA: NEGATIVE
HBV CORE IGM SERPL QL IA: NEGATIVE
HBV SURFACE AG SERPL QL IA: NEGATIVE
HCV AB S/CO SERPL IA: <0.1 S/CO RATIO (ref 0–0.9)
PROT SERPL-MCNC: 6.7 G/DL (ref 6–8.5)

## 2019-03-20 ENCOUNTER — TELEPHONE (OUTPATIENT)
Dept: PHARMACY | Facility: HOSPITAL | Age: 84
End: 2019-03-20

## 2019-03-21 ENCOUNTER — ANTICOAGULATION VISIT (OUTPATIENT)
Dept: PHARMACY | Facility: HOSPITAL | Age: 84
End: 2019-03-21

## 2019-03-21 LAB
INR PPP: 1.8 (ref 0.91–1.09)
PROTHROMBIN TIME: 21.2 SECONDS (ref 10–13.8)

## 2019-03-21 PROCEDURE — 36416 COLLJ CAPILLARY BLOOD SPEC: CPT

## 2019-03-21 PROCEDURE — 85610 PROTHROMBIN TIME: CPT

## 2019-04-08 DIAGNOSIS — R80.9 MICROALBUMINURIA: ICD-10-CM

## 2019-04-08 DIAGNOSIS — R74.8 LIVER ENZYME ELEVATION: ICD-10-CM

## 2019-04-08 DIAGNOSIS — I10 ESSENTIAL HYPERTENSION: ICD-10-CM

## 2019-04-08 DIAGNOSIS — Z00.00 HEALTHCARE MAINTENANCE: Primary | ICD-10-CM

## 2019-04-08 DIAGNOSIS — E11.29 CONTROLLED TYPE 2 DIABETES MELLITUS WITH MICROALBUMINURIA, WITHOUT LONG-TERM CURRENT USE OF INSULIN (HCC): ICD-10-CM

## 2019-04-08 DIAGNOSIS — E55.9 VITAMIN D DEFICIENCY: ICD-10-CM

## 2019-04-08 DIAGNOSIS — E79.0 ELEVATED BLOOD URIC ACID LEVEL: ICD-10-CM

## 2019-04-08 DIAGNOSIS — R80.9 CONTROLLED TYPE 2 DIABETES MELLITUS WITH MICROALBUMINURIA, WITHOUT LONG-TERM CURRENT USE OF INSULIN (HCC): ICD-10-CM

## 2019-04-09 RX ORDER — WARFARIN SODIUM 5 MG/1
TABLET ORAL
Qty: 90 TABLET | Refills: 0 | Status: SHIPPED | OUTPATIENT
Start: 2019-04-09 | End: 2019-06-11 | Stop reason: SDUPTHER

## 2019-04-11 LAB
25(OH)D3+25(OH)D2 SERPL-MCNC: 35.2 NG/ML (ref 30–100)
ALBUMIN SERPL-MCNC: 4.6 G/DL (ref 3.5–5.2)
ALBUMIN/CREAT UR: 41.7 MG/G CREAT (ref 0–30)
ALBUMIN/GLOB SERPL: 1.8 G/DL
ALP SERPL-CCNC: 237 U/L (ref 39–117)
ALT SERPL-CCNC: 250 U/L (ref 1–41)
APPEARANCE UR: CLEAR
AST SERPL-CCNC: 128 U/L (ref 1–40)
BACTERIA #/AREA URNS HPF: ABNORMAL /HPF
BASOPHILS # BLD AUTO: 0.02 10*3/MM3 (ref 0–0.2)
BASOPHILS NFR BLD AUTO: 0.5 % (ref 0–1.5)
BILIRUB SERPL-MCNC: 0.7 MG/DL (ref 0.2–1.2)
BILIRUB UR QL STRIP: NEGATIVE
BUN SERPL-MCNC: 18 MG/DL (ref 8–23)
BUN/CREAT SERPL: 17 (ref 7–25)
CALCIUM SERPL-MCNC: 9.9 MG/DL (ref 8.6–10.5)
CASTS URNS MICRO: ABNORMAL
CASTS URNS QL MICRO: PRESENT /LPF
CHLORIDE SERPL-SCNC: 104 MMOL/L (ref 98–107)
CHOLEST SERPL-MCNC: 102 MG/DL (ref 0–200)
CO2 SERPL-SCNC: 26.4 MMOL/L (ref 22–29)
COLOR UR: YELLOW
CREAT SERPL-MCNC: 1.06 MG/DL (ref 0.76–1.27)
CREAT UR-MCNC: 68.8 MG/DL
EOSINOPHIL # BLD AUTO: 0.32 10*3/MM3 (ref 0–0.4)
EOSINOPHIL NFR BLD AUTO: 7.2 % (ref 0.3–6.2)
EPI CELLS #/AREA URNS HPF: ABNORMAL /HPF
ERYTHROCYTE [DISTWIDTH] IN BLOOD BY AUTOMATED COUNT: 13.2 % (ref 12.3–15.4)
GLOBULIN SER CALC-MCNC: 2.5 GM/DL
GLUCOSE SERPL-MCNC: 130 MG/DL (ref 65–99)
GLUCOSE UR QL: NEGATIVE
HBA1C MFR BLD: 6.2 % (ref 4.8–5.6)
HCT VFR BLD AUTO: 42.1 % (ref 37.5–51)
HDLC SERPL-MCNC: 50 MG/DL (ref 40–60)
HGB BLD-MCNC: 13.8 G/DL (ref 13–17.7)
HGB UR QL STRIP: NEGATIVE
IMM GRANULOCYTES # BLD AUTO: 0.01 10*3/MM3 (ref 0–0.05)
IMM GRANULOCYTES NFR BLD AUTO: 0.2 % (ref 0–0.5)
KETONES UR QL STRIP: NEGATIVE
LDLC SERPL CALC-MCNC: 34 MG/DL (ref 0–100)
LEUKOCYTE ESTERASE UR QL STRIP: NEGATIVE
LYMPHOCYTES # BLD AUTO: 1.37 10*3/MM3 (ref 0.7–3.1)
LYMPHOCYTES NFR BLD AUTO: 31 % (ref 19.6–45.3)
MCH RBC QN AUTO: 33.3 PG (ref 26.6–33)
MCHC RBC AUTO-ENTMCNC: 32.8 G/DL (ref 31.5–35.7)
MCV RBC AUTO: 101.7 FL (ref 79–97)
MICRO URNS: NORMAL
MICRO URNS: NORMAL
MICROALBUMIN UR-MCNC: 28.7 UG/ML
MONOCYTES # BLD AUTO: 0.45 10*3/MM3 (ref 0.1–0.9)
MONOCYTES NFR BLD AUTO: 10.2 % (ref 5–12)
MUCOUS THREADS URNS QL MICRO: PRESENT /HPF
NEUTROPHILS # BLD AUTO: 2.25 10*3/MM3 (ref 1.4–7)
NEUTROPHILS NFR BLD AUTO: 50.9 % (ref 42.7–76)
NITRITE UR QL STRIP: NEGATIVE
NRBC BLD AUTO-RTO: 0 /100 WBC (ref 0–0)
PH UR STRIP: 5.5 [PH] (ref 5–7.5)
PLATELET # BLD AUTO: 140 10*3/MM3 (ref 140–450)
POTASSIUM SERPL-SCNC: 5 MMOL/L (ref 3.5–5.2)
PROT SERPL-MCNC: 7.1 G/DL (ref 6–8.5)
PROT UR QL STRIP: NEGATIVE
RBC # BLD AUTO: 4.14 10*6/MM3 (ref 4.14–5.8)
RBC #/AREA URNS HPF: ABNORMAL /HPF
SODIUM SERPL-SCNC: 142 MMOL/L (ref 136–145)
SP GR UR: 1.02 (ref 1–1.03)
TRIGL SERPL-MCNC: 92 MG/DL (ref 0–150)
URINALYSIS REFLEX: NORMAL
UROBILINOGEN UR STRIP-MCNC: 0.2 MG/DL (ref 0.2–1)
VLDLC SERPL CALC-MCNC: 18.4 MG/DL
WBC # BLD AUTO: 4.42 10*3/MM3 (ref 3.4–10.8)
WBC #/AREA URNS HPF: ABNORMAL /HPF

## 2019-04-16 ENCOUNTER — TELEPHONE (OUTPATIENT)
Dept: PHARMACY | Facility: HOSPITAL | Age: 84
End: 2019-04-16

## 2019-04-17 ENCOUNTER — ANTICOAGULATION VISIT (OUTPATIENT)
Dept: PHARMACY | Facility: HOSPITAL | Age: 84
End: 2019-04-17

## 2019-04-17 ENCOUNTER — APPOINTMENT (OUTPATIENT)
Dept: PHARMACY | Facility: HOSPITAL | Age: 84
End: 2019-04-17

## 2019-04-17 LAB
INR PPP: 1.6 (ref 0.91–1.09)
PROTHROMBIN TIME: 18.8 SECONDS (ref 10–13.8)

## 2019-04-17 PROCEDURE — 36416 COLLJ CAPILLARY BLOOD SPEC: CPT

## 2019-04-17 PROCEDURE — G0463 HOSPITAL OUTPT CLINIC VISIT: HCPCS

## 2019-04-17 PROCEDURE — 85610 PROTHROMBIN TIME: CPT

## 2019-04-22 RX ORDER — FUROSEMIDE 20 MG/1
TABLET ORAL
Qty: 30 TABLET | Refills: 0 | Status: SHIPPED | OUTPATIENT
Start: 2019-04-22 | End: 2019-06-14 | Stop reason: SDUPTHER

## 2019-05-01 ENCOUNTER — ANTICOAGULATION VISIT (OUTPATIENT)
Dept: PHARMACY | Facility: HOSPITAL | Age: 84
End: 2019-05-01

## 2019-05-01 LAB
INR PPP: 1.9 (ref 0.91–1.09)
PROTHROMBIN TIME: 23.1 SECONDS (ref 10–13.8)

## 2019-05-01 PROCEDURE — 85610 PROTHROMBIN TIME: CPT

## 2019-05-01 PROCEDURE — 36416 COLLJ CAPILLARY BLOOD SPEC: CPT

## 2019-05-07 ENCOUNTER — OFFICE VISIT (OUTPATIENT)
Dept: INTERNAL MEDICINE | Facility: CLINIC | Age: 84
End: 2019-05-07

## 2019-05-07 VITALS
DIASTOLIC BLOOD PRESSURE: 70 MMHG | OXYGEN SATURATION: 95 % | HEIGHT: 70 IN | BODY MASS INDEX: 33.36 KG/M2 | WEIGHT: 233 LBS | SYSTOLIC BLOOD PRESSURE: 122 MMHG | TEMPERATURE: 98.1 F | RESPIRATION RATE: 12 BRPM | HEART RATE: 83 BPM

## 2019-05-07 DIAGNOSIS — R80.9 MICROALBUMINURIA: ICD-10-CM

## 2019-05-07 DIAGNOSIS — K65.1 RIGHT UPPER QUADRANT ABDOMINAL ABSCESS (HCC): ICD-10-CM

## 2019-05-07 DIAGNOSIS — G47.33 OBSTRUCTIVE SLEEP APNEA SYNDROME: ICD-10-CM

## 2019-05-07 DIAGNOSIS — Z00.00 HEALTHCARE MAINTENANCE: Primary | ICD-10-CM

## 2019-05-07 DIAGNOSIS — C44.320 SCC (SQUAMOUS CELL CARCINOMA), FACE: ICD-10-CM

## 2019-05-07 DIAGNOSIS — R41.3 MEMORY LOSS: ICD-10-CM

## 2019-05-07 DIAGNOSIS — E55.9 VITAMIN D DEFICIENCY: ICD-10-CM

## 2019-05-07 DIAGNOSIS — N40.1 BENIGN NON-NODULAR PROSTATIC HYPERPLASIA WITH LOWER URINARY TRACT SYMPTOMS: ICD-10-CM

## 2019-05-07 DIAGNOSIS — E11.29 CONTROLLED TYPE 2 DIABETES MELLITUS WITH MICROALBUMINURIA, WITHOUT LONG-TERM CURRENT USE OF INSULIN (HCC): ICD-10-CM

## 2019-05-07 DIAGNOSIS — I63.40 CEREBROVASCULAR ACCIDENT (CVA) DUE TO EMBOLISM OF CEREBRAL ARTERY (HCC): ICD-10-CM

## 2019-05-07 DIAGNOSIS — C61 PROSTATE CANCER (HCC): ICD-10-CM

## 2019-05-07 DIAGNOSIS — C43.9 MALIGNANT MELANOMA, UNSPECIFIED SITE (HCC): ICD-10-CM

## 2019-05-07 DIAGNOSIS — G40.909 SEIZURE DISORDER (HCC): ICD-10-CM

## 2019-05-07 DIAGNOSIS — I49.5 SICK SINUS SYNDROME (HCC): ICD-10-CM

## 2019-05-07 DIAGNOSIS — H91.93 BILATERAL HEARING LOSS, UNSPECIFIED HEARING LOSS TYPE: ICD-10-CM

## 2019-05-07 DIAGNOSIS — I48.91 ATRIAL FIBRILLATION, UNSPECIFIED TYPE (HCC): ICD-10-CM

## 2019-05-07 DIAGNOSIS — D12.6 TUBULAR ADENOMA OF COLON: ICD-10-CM

## 2019-05-07 DIAGNOSIS — R80.9 CONTROLLED TYPE 2 DIABETES MELLITUS WITH MICROALBUMINURIA, WITHOUT LONG-TERM CURRENT USE OF INSULIN (HCC): ICD-10-CM

## 2019-05-07 DIAGNOSIS — E79.0 ELEVATED BLOOD URIC ACID LEVEL: ICD-10-CM

## 2019-05-07 DIAGNOSIS — I10 ESSENTIAL HYPERTENSION: ICD-10-CM

## 2019-05-07 DIAGNOSIS — R74.8 LIVER ENZYME ELEVATION: ICD-10-CM

## 2019-05-07 DIAGNOSIS — G40.209 PARTIAL EPILEPSY WITH IMPAIRMENT OF CONSCIOUSNESS (HCC): ICD-10-CM

## 2019-05-07 PROBLEM — N40.2 PROSTATE NODULE: Status: RESOLVED | Noted: 2017-12-01 | Resolved: 2019-05-07

## 2019-05-07 PROCEDURE — 99397 PER PM REEVAL EST PAT 65+ YR: CPT | Performed by: INTERNAL MEDICINE

## 2019-05-07 PROCEDURE — 96160 PT-FOCUSED HLTH RISK ASSMT: CPT | Performed by: INTERNAL MEDICINE

## 2019-05-07 PROCEDURE — G0439 PPPS, SUBSEQ VISIT: HCPCS | Performed by: INTERNAL MEDICINE

## 2019-05-14 RX ORDER — CARVEDILOL 25 MG/1
25 TABLET ORAL 2 TIMES DAILY WITH MEALS
Qty: 60 TABLET | Refills: 0 | Status: SHIPPED | OUTPATIENT
Start: 2019-05-14 | End: 2019-06-14 | Stop reason: SDUPTHER

## 2019-05-22 ENCOUNTER — CLINICAL SUPPORT NO REQUIREMENTS (OUTPATIENT)
Dept: CARDIOLOGY | Facility: CLINIC | Age: 84
End: 2019-05-22

## 2019-05-22 DIAGNOSIS — I47.20 VENTRICULAR TACHYCARDIA (HCC): Primary | ICD-10-CM

## 2019-05-22 PROCEDURE — 93295 DEV INTERROG REMOTE 1/2/MLT: CPT | Performed by: INTERNAL MEDICINE

## 2019-05-22 PROCEDURE — 93296 REM INTERROG EVL PM/IDS: CPT | Performed by: INTERNAL MEDICINE

## 2019-05-24 ENCOUNTER — ANTICOAGULATION VISIT (OUTPATIENT)
Dept: PHARMACY | Facility: HOSPITAL | Age: 84
End: 2019-05-24

## 2019-05-24 LAB
INR PPP: 3.6 (ref 0.91–1.09)
PROTHROMBIN TIME: 42.8 SECONDS (ref 10–13.8)

## 2019-05-24 PROCEDURE — 36416 COLLJ CAPILLARY BLOOD SPEC: CPT

## 2019-05-24 PROCEDURE — G0463 HOSPITAL OUTPT CLINIC VISIT: HCPCS

## 2019-05-24 PROCEDURE — 85610 PROTHROMBIN TIME: CPT

## 2019-06-04 ENCOUNTER — ANTICOAGULATION VISIT (OUTPATIENT)
Dept: PHARMACY | Facility: HOSPITAL | Age: 84
End: 2019-06-04

## 2019-06-04 LAB
INR PPP: 2 (ref 0.91–1.09)
PROTHROMBIN TIME: 23.5 SECONDS (ref 10–13.8)

## 2019-06-04 PROCEDURE — 85610 PROTHROMBIN TIME: CPT

## 2019-06-04 PROCEDURE — 36416 COLLJ CAPILLARY BLOOD SPEC: CPT

## 2019-06-10 ENCOUNTER — ANTICOAGULATION VISIT (OUTPATIENT)
Dept: PHARMACY | Facility: HOSPITAL | Age: 84
End: 2019-06-10

## 2019-06-10 LAB
INR PPP: 1.7 (ref 0.91–1.09)
PROTHROMBIN TIME: 20 SECONDS (ref 10–13.8)

## 2019-06-10 PROCEDURE — 85610 PROTHROMBIN TIME: CPT

## 2019-06-10 PROCEDURE — 36416 COLLJ CAPILLARY BLOOD SPEC: CPT

## 2019-06-11 RX ORDER — WARFARIN SODIUM 5 MG/1
TABLET ORAL
Qty: 90 TABLET | Refills: 0 | Status: SHIPPED | OUTPATIENT
Start: 2019-06-11 | End: 2019-08-14 | Stop reason: SDUPTHER

## 2019-06-14 RX ORDER — FUROSEMIDE 20 MG/1
TABLET ORAL
Qty: 30 TABLET | Refills: 3 | Status: SHIPPED | OUTPATIENT
Start: 2019-06-14 | End: 2020-03-02

## 2019-06-14 RX ORDER — CARVEDILOL 25 MG/1
TABLET ORAL
Qty: 60 TABLET | Refills: 3 | Status: SHIPPED | OUTPATIENT
Start: 2019-06-14 | End: 2019-11-07 | Stop reason: SDUPTHER

## 2019-06-18 ENCOUNTER — APPOINTMENT (OUTPATIENT)
Dept: PHARMACY | Facility: HOSPITAL | Age: 84
End: 2019-06-18

## 2019-06-18 ENCOUNTER — ANTICOAGULATION VISIT (OUTPATIENT)
Dept: PHARMACY | Facility: HOSPITAL | Age: 84
End: 2019-06-18

## 2019-06-18 LAB
INR PPP: 2 (ref 0.91–1.09)
PROTHROMBIN TIME: 23.7 SECONDS (ref 10–13.8)

## 2019-06-18 PROCEDURE — 85610 PROTHROMBIN TIME: CPT

## 2019-06-18 PROCEDURE — 36416 COLLJ CAPILLARY BLOOD SPEC: CPT

## 2019-07-02 RX ORDER — FOLIC ACID 1 MG/1
1 TABLET ORAL DAILY
Qty: 90 TABLET | Refills: 1 | Status: SHIPPED | OUTPATIENT
Start: 2019-07-02 | End: 2020-04-06

## 2019-07-17 ENCOUNTER — ANTICOAGULATION VISIT (OUTPATIENT)
Dept: PHARMACY | Facility: HOSPITAL | Age: 84
End: 2019-07-17

## 2019-07-17 LAB
INR PPP: 2.3 (ref 0.91–1.09)
PROTHROMBIN TIME: 27.5 SECONDS (ref 10–13.8)

## 2019-07-17 PROCEDURE — 36416 COLLJ CAPILLARY BLOOD SPEC: CPT

## 2019-07-17 PROCEDURE — 85610 PROTHROMBIN TIME: CPT

## 2019-08-15 RX ORDER — WARFARIN SODIUM 5 MG/1
TABLET ORAL
Qty: 90 TABLET | Refills: 0 | Status: SHIPPED | OUTPATIENT
Start: 2019-08-15 | End: 2019-10-22 | Stop reason: SDUPTHER

## 2019-08-29 DIAGNOSIS — E11.29 CONTROLLED TYPE 2 DIABETES MELLITUS WITH MICROALBUMINURIA, WITHOUT LONG-TERM CURRENT USE OF INSULIN (HCC): Primary | ICD-10-CM

## 2019-08-29 DIAGNOSIS — E79.0 ELEVATED BLOOD URIC ACID LEVEL: ICD-10-CM

## 2019-08-29 DIAGNOSIS — I10 ESSENTIAL HYPERTENSION: ICD-10-CM

## 2019-08-29 DIAGNOSIS — R80.9 CONTROLLED TYPE 2 DIABETES MELLITUS WITH MICROALBUMINURIA, WITHOUT LONG-TERM CURRENT USE OF INSULIN (HCC): Primary | ICD-10-CM

## 2019-09-11 ENCOUNTER — ANTICOAGULATION VISIT (OUTPATIENT)
Dept: PHARMACY | Facility: HOSPITAL | Age: 84
End: 2019-09-11

## 2019-09-11 LAB
INR PPP: 2.2 (ref 0.91–1.09)
PROTHROMBIN TIME: 25.9 SECONDS (ref 10–13.8)

## 2019-09-11 PROCEDURE — 85610 PROTHROMBIN TIME: CPT

## 2019-09-11 PROCEDURE — 36416 COLLJ CAPILLARY BLOOD SPEC: CPT

## 2019-09-12 LAB
ALBUMIN SERPL-MCNC: 4.1 G/DL (ref 3.5–5.2)
ALBUMIN/GLOB SERPL: 1.7 G/DL
ALP SERPL-CCNC: 62 U/L (ref 39–117)
ALT SERPL-CCNC: 29 U/L (ref 1–41)
APPEARANCE UR: CLEAR
AST SERPL-CCNC: 23 U/L (ref 1–40)
BACTERIA #/AREA URNS HPF: NORMAL /HPF
BASOPHILS # BLD AUTO: (no result) 10*3/UL
BASOPHILS # BLD MANUAL: 0.05 10*3/MM3 (ref 0–0.2)
BASOPHILS NFR BLD MANUAL: 1 % (ref 0–1.5)
BILIRUB SERPL-MCNC: 0.5 MG/DL (ref 0.2–1.2)
BILIRUB UR QL STRIP: NEGATIVE
BUN SERPL-MCNC: 14 MG/DL (ref 8–23)
BUN/CREAT SERPL: 13.3 (ref 7–25)
CALCIUM SERPL-MCNC: 9.9 MG/DL (ref 8.6–10.5)
CHLORIDE SERPL-SCNC: 104 MMOL/L (ref 98–107)
CO2 SERPL-SCNC: 30.8 MMOL/L (ref 22–29)
COLOR UR: YELLOW
CREAT SERPL-MCNC: 1.05 MG/DL (ref 0.76–1.27)
DIFFERENTIAL COMMENT: NORMAL
EOSINOPHIL # BLD AUTO: (no result) 10*3/UL
EOSINOPHIL # BLD MANUAL: 0.19 10*3/MM3 (ref 0–0.4)
EOSINOPHIL NFR BLD AUTO: (no result) %
EOSINOPHIL NFR BLD MANUAL: 4.1 % (ref 0.3–6.2)
EPI CELLS #/AREA URNS HPF: NORMAL /HPF
ERYTHROCYTE [DISTWIDTH] IN BLOOD BY AUTOMATED COUNT: 13.5 % (ref 12.3–15.4)
GLOBULIN SER CALC-MCNC: 2.4 GM/DL
GLUCOSE SERPL-MCNC: 107 MG/DL (ref 65–99)
GLUCOSE UR QL: NEGATIVE
HCT VFR BLD AUTO: 41 % (ref 37.5–51)
HGB BLD-MCNC: 12.7 G/DL (ref 13–17.7)
HGB UR QL STRIP: NEGATIVE
KETONES UR QL STRIP: NEGATIVE
LEUKOCYTE ESTERASE UR QL STRIP: NEGATIVE
LYMPHOCYTES # BLD AUTO: (no result) 10*3/UL
LYMPHOCYTES # BLD MANUAL: 0.94 10*3/MM3 (ref 0.7–3.1)
LYMPHOCYTES NFR BLD AUTO: (no result) %
LYMPHOCYTES NFR BLD MANUAL: 20.4 % (ref 19.6–45.3)
MCH RBC QN AUTO: 33.4 PG (ref 26.6–33)
MCHC RBC AUTO-ENTMCNC: 31 G/DL (ref 31.5–35.7)
MCV RBC AUTO: 107.9 FL (ref 79–97)
MICRO URNS: NORMAL
MICRO URNS: NORMAL
MONOCYTES # BLD MANUAL: 0.33 10*3/MM3 (ref 0.1–0.9)
MONOCYTES NFR BLD AUTO: (no result) %
MONOCYTES NFR BLD MANUAL: 7.1 % (ref 5–12)
MUCOUS THREADS URNS QL MICRO: PRESENT /HPF
NEUTROPHILS # BLD MANUAL: 3.09 10*3/MM3 (ref 1.7–7)
NEUTROPHILS NFR BLD AUTO: (no result) %
NEUTROPHILS NFR BLD MANUAL: 67.3 % (ref 42.7–76)
NITRITE UR QL STRIP: NEGATIVE
PH UR STRIP: 5.5 [PH] (ref 5–7.5)
PLATELET # BLD AUTO: 158 10*3/MM3 (ref 140–450)
PLATELET BLD QL SMEAR: NORMAL
POTASSIUM SERPL-SCNC: 5.1 MMOL/L (ref 3.5–5.2)
PROT SERPL-MCNC: 6.5 G/DL (ref 6–8.5)
PROT UR QL STRIP: NEGATIVE
RBC # BLD AUTO: 3.8 10*6/MM3 (ref 4.14–5.8)
RBC #/AREA URNS HPF: NORMAL /HPF
RBC MORPH BLD: NORMAL
SODIUM SERPL-SCNC: 146 MMOL/L (ref 136–145)
SP GR UR: 1.02 (ref 1–1.03)
URATE SERPL-MCNC: 5.9 MG/DL (ref 3.4–7)
URINALYSIS REFLEX: NORMAL
UROBILINOGEN UR STRIP-MCNC: 0.2 MG/DL (ref 0.2–1)
WBC # BLD AUTO: 4.59 10*3/MM3 (ref 3.4–10.8)
WBC #/AREA URNS HPF: NORMAL /HPF

## 2019-09-12 RX ORDER — ATORVASTATIN CALCIUM 20 MG/1
TABLET, FILM COATED ORAL
Qty: 90 TABLET | Refills: 3 | Status: SHIPPED | OUTPATIENT
Start: 2019-09-12 | End: 2020-08-05

## 2019-09-16 ENCOUNTER — OFFICE VISIT (OUTPATIENT)
Dept: INTERNAL MEDICINE | Facility: CLINIC | Age: 84
End: 2019-09-16

## 2019-09-16 VITALS
OXYGEN SATURATION: 96 % | HEART RATE: 75 BPM | WEIGHT: 237 LBS | BODY MASS INDEX: 33.93 KG/M2 | DIASTOLIC BLOOD PRESSURE: 80 MMHG | HEIGHT: 70 IN | TEMPERATURE: 98.4 F | SYSTOLIC BLOOD PRESSURE: 130 MMHG

## 2019-09-16 DIAGNOSIS — E11.29 CONTROLLED TYPE 2 DIABETES MELLITUS WITH MICROALBUMINURIA, WITHOUT LONG-TERM CURRENT USE OF INSULIN (HCC): ICD-10-CM

## 2019-09-16 DIAGNOSIS — E79.0 ELEVATED BLOOD URIC ACID LEVEL: ICD-10-CM

## 2019-09-16 DIAGNOSIS — R74.8 LIVER ENZYME ELEVATION: ICD-10-CM

## 2019-09-16 DIAGNOSIS — I42.1 HYPERTROPHIC OBSTRUCTIVE CARDIOMYOPATHY (HCC): ICD-10-CM

## 2019-09-16 DIAGNOSIS — I48.91 ATRIAL FIBRILLATION, UNSPECIFIED TYPE (HCC): ICD-10-CM

## 2019-09-16 DIAGNOSIS — G47.33 OBSTRUCTIVE SLEEP APNEA SYNDROME: ICD-10-CM

## 2019-09-16 DIAGNOSIS — Z00.00 HEALTHCARE MAINTENANCE: ICD-10-CM

## 2019-09-16 DIAGNOSIS — I10 ESSENTIAL HYPERTENSION: Primary | ICD-10-CM

## 2019-09-16 DIAGNOSIS — R80.9 CONTROLLED TYPE 2 DIABETES MELLITUS WITH MICROALBUMINURIA, WITHOUT LONG-TERM CURRENT USE OF INSULIN (HCC): ICD-10-CM

## 2019-09-16 PROBLEM — K65.1 RIGHT UPPER QUADRANT ABDOMINAL ABSCESS: Status: RESOLVED | Noted: 2019-02-08 | Resolved: 2019-09-16

## 2019-09-16 PROCEDURE — 99214 OFFICE O/P EST MOD 30 MIN: CPT | Performed by: INTERNAL MEDICINE

## 2019-10-02 ENCOUNTER — CLINICAL SUPPORT NO REQUIREMENTS (OUTPATIENT)
Dept: CARDIOLOGY | Facility: CLINIC | Age: 84
End: 2019-10-02

## 2019-10-02 DIAGNOSIS — I47.20 VENTRICULAR TACHYCARDIA (HCC): Primary | ICD-10-CM

## 2019-10-02 PROCEDURE — 93295 DEV INTERROG REMOTE 1/2/MLT: CPT | Performed by: INTERNAL MEDICINE

## 2019-10-02 PROCEDURE — 93296 REM INTERROG EVL PM/IDS: CPT | Performed by: INTERNAL MEDICINE

## 2019-10-25 RX ORDER — WARFARIN SODIUM 5 MG/1
TABLET ORAL
Qty: 90 TABLET | Refills: 0 | Status: SHIPPED | OUTPATIENT
Start: 2019-10-25 | End: 2020-02-17 | Stop reason: SDUPTHER

## 2019-11-04 ENCOUNTER — TELEPHONE (OUTPATIENT)
Dept: PHARMACY | Facility: HOSPITAL | Age: 84
End: 2019-11-04

## 2019-11-05 ENCOUNTER — ANTICOAGULATION VISIT (OUTPATIENT)
Dept: PHARMACY | Facility: HOSPITAL | Age: 84
End: 2019-11-05

## 2019-11-05 LAB
INR PPP: 2.1 (ref 0.91–1.09)
PROTHROMBIN TIME: 25 SECONDS (ref 10–13.8)

## 2019-11-05 PROCEDURE — 36416 COLLJ CAPILLARY BLOOD SPEC: CPT

## 2019-11-05 PROCEDURE — 85610 PROTHROMBIN TIME: CPT

## 2019-11-07 RX ORDER — CARVEDILOL 25 MG/1
TABLET ORAL
Qty: 60 TABLET | Refills: 2 | Status: SHIPPED | OUTPATIENT
Start: 2019-11-07 | End: 2020-03-02

## 2019-12-05 ENCOUNTER — ANTICOAGULATION VISIT (OUTPATIENT)
Dept: PHARMACY | Facility: HOSPITAL | Age: 84
End: 2019-12-05

## 2019-12-05 LAB
INR PPP: 2.6 (ref 0.91–1.09)
PROTHROMBIN TIME: 31.4 SECONDS (ref 10–13.8)

## 2019-12-05 PROCEDURE — G0463 HOSPITAL OUTPT CLINIC VISIT: HCPCS

## 2019-12-05 PROCEDURE — 85610 PROTHROMBIN TIME: CPT

## 2019-12-05 PROCEDURE — 36416 COLLJ CAPILLARY BLOOD SPEC: CPT

## 2020-01-09 DIAGNOSIS — R80.9 MICROALBUMINURIA: ICD-10-CM

## 2020-01-09 DIAGNOSIS — R80.9 CONTROLLED TYPE 2 DIABETES MELLITUS WITH MICROALBUMINURIA, WITHOUT LONG-TERM CURRENT USE OF INSULIN (HCC): Primary | ICD-10-CM

## 2020-01-09 DIAGNOSIS — I10 ESSENTIAL HYPERTENSION: ICD-10-CM

## 2020-01-09 DIAGNOSIS — E11.29 CONTROLLED TYPE 2 DIABETES MELLITUS WITH MICROALBUMINURIA, WITHOUT LONG-TERM CURRENT USE OF INSULIN (HCC): Primary | ICD-10-CM

## 2020-01-10 ENCOUNTER — ANTICOAGULATION VISIT (OUTPATIENT)
Dept: PHARMACY | Facility: HOSPITAL | Age: 85
End: 2020-01-10

## 2020-01-10 LAB
INR PPP: 2.2 (ref 0.91–1.09)
PROTHROMBIN TIME: 26.5 SECONDS (ref 10–13.8)

## 2020-01-10 PROCEDURE — 85610 PROTHROMBIN TIME: CPT

## 2020-01-10 PROCEDURE — 36416 COLLJ CAPILLARY BLOOD SPEC: CPT

## 2020-01-16 LAB
ALBUMIN SERPL-MCNC: 4.2 G/DL (ref 3.5–5.2)
ALBUMIN/GLOB SERPL: 1.8 G/DL
ALP SERPL-CCNC: 61 U/L (ref 39–117)
ALT SERPL-CCNC: 34 U/L (ref 1–41)
AST SERPL-CCNC: 37 U/L (ref 1–40)
BASOPHILS # BLD AUTO: 0.03 10*3/MM3 (ref 0–0.2)
BASOPHILS NFR BLD AUTO: 0.7 % (ref 0–1.5)
BILIRUB SERPL-MCNC: 0.7 MG/DL (ref 0.2–1.2)
BUN SERPL-MCNC: 16 MG/DL (ref 8–23)
BUN/CREAT SERPL: 15.4 (ref 7–25)
CALCIUM SERPL-MCNC: 9.2 MG/DL (ref 8.2–9.6)
CHLORIDE SERPL-SCNC: 103 MMOL/L (ref 98–107)
CO2 SERPL-SCNC: 28.2 MMOL/L (ref 22–29)
CREAT SERPL-MCNC: 1.04 MG/DL (ref 0.76–1.27)
EOSINOPHIL # BLD AUTO: 0.15 10*3/MM3 (ref 0–0.4)
EOSINOPHIL NFR BLD AUTO: 3.4 % (ref 0.3–6.2)
ERYTHROCYTE [DISTWIDTH] IN BLOOD BY AUTOMATED COUNT: 12.9 % (ref 12.3–15.4)
FOLATE SERPL-MCNC: >20 NG/ML (ref 4.78–24.2)
GLOBULIN SER CALC-MCNC: 2.4 GM/DL
GLUCOSE SERPL-MCNC: 120 MG/DL (ref 65–99)
HBA1C MFR BLD: 6.8 % (ref 4.8–5.6)
HCT VFR BLD AUTO: 38.3 % (ref 37.5–51)
HGB BLD-MCNC: 12.5 G/DL (ref 13–17.7)
IMM GRANULOCYTES # BLD AUTO: 0.02 10*3/MM3 (ref 0–0.05)
IMM GRANULOCYTES NFR BLD AUTO: 0.5 % (ref 0–0.5)
LYMPHOCYTES # BLD AUTO: 1.2 10*3/MM3 (ref 0.7–3.1)
LYMPHOCYTES NFR BLD AUTO: 27.3 % (ref 19.6–45.3)
MCH RBC QN AUTO: 33 PG (ref 26.6–33)
MCHC RBC AUTO-ENTMCNC: 32.6 G/DL (ref 31.5–35.7)
MCV RBC AUTO: 101.1 FL (ref 79–97)
MONOCYTES # BLD AUTO: 0.42 10*3/MM3 (ref 0.1–0.9)
MONOCYTES NFR BLD AUTO: 9.5 % (ref 5–12)
NEUTROPHILS # BLD AUTO: 2.58 10*3/MM3 (ref 1.7–7)
NEUTROPHILS NFR BLD AUTO: 58.6 % (ref 42.7–76)
NRBC BLD AUTO-RTO: 0 /100 WBC (ref 0–0.2)
PLATELET # BLD AUTO: 138 10*3/MM3 (ref 140–450)
POTASSIUM SERPL-SCNC: 5.2 MMOL/L (ref 3.5–5.2)
PROT SERPL-MCNC: 6.6 G/DL (ref 6–8.5)
RBC # BLD AUTO: 3.79 10*6/MM3 (ref 4.14–5.8)
SODIUM SERPL-SCNC: 141 MMOL/L (ref 136–145)
VIT B12 SERPL-MCNC: 1112 PG/ML (ref 211–946)
WBC # BLD AUTO: 4.4 10*3/MM3 (ref 3.4–10.8)

## 2020-02-17 ENCOUNTER — TELEPHONE (OUTPATIENT)
Dept: INTERNAL MEDICINE | Facility: CLINIC | Age: 85
End: 2020-02-17

## 2020-02-17 RX ORDER — WARFARIN SODIUM 5 MG/1
5 TABLET ORAL DAILY
Qty: 90 TABLET | Refills: 0 | Status: SHIPPED | OUTPATIENT
Start: 2020-02-17 | End: 2020-03-26 | Stop reason: SDUPTHER

## 2020-02-25 ENCOUNTER — TELEPHONE (OUTPATIENT)
Dept: PHARMACY | Facility: HOSPITAL | Age: 85
End: 2020-02-25

## 2020-03-02 RX ORDER — CARVEDILOL 25 MG/1
TABLET ORAL
Qty: 60 TABLET | Refills: 1 | Status: SHIPPED | OUTPATIENT
Start: 2020-03-02 | End: 2020-05-08

## 2020-03-02 RX ORDER — FUROSEMIDE 20 MG/1
TABLET ORAL
Qty: 30 TABLET | Refills: 2 | Status: SHIPPED | OUTPATIENT
Start: 2020-03-02 | End: 2020-09-21

## 2020-03-03 ENCOUNTER — APPOINTMENT (OUTPATIENT)
Dept: LAB | Facility: HOSPITAL | Age: 85
End: 2020-03-03

## 2020-03-03 DIAGNOSIS — I48.91 ATRIAL FIBRILLATION, UNSPECIFIED TYPE (HCC): Primary | ICD-10-CM

## 2020-03-03 LAB
INR PPP: 2.31 (ref 0.9–1.1)
PROTHROMBIN TIME: 25.1 SECONDS (ref 11.7–14.2)

## 2020-03-03 PROCEDURE — 85610 PROTHROMBIN TIME: CPT | Performed by: INTERNAL MEDICINE

## 2020-03-03 PROCEDURE — 36415 COLL VENOUS BLD VENIPUNCTURE: CPT

## 2020-03-04 ENCOUNTER — ANTICOAGULATION VISIT (OUTPATIENT)
Dept: PHARMACY | Facility: HOSPITAL | Age: 85
End: 2020-03-04

## 2020-03-04 DIAGNOSIS — I48.91 ATRIAL FIBRILLATION, UNSPECIFIED TYPE (HCC): ICD-10-CM

## 2020-03-26 ENCOUNTER — ANTICOAGULATION VISIT (OUTPATIENT)
Dept: PHARMACY | Facility: HOSPITAL | Age: 85
End: 2020-03-26

## 2020-03-26 DIAGNOSIS — I48.19 PERSISTENT ATRIAL FIBRILLATION (HCC): Primary | ICD-10-CM

## 2020-03-26 LAB
INR PPP: 2.7 (ref 0.91–1.09)
PROTHROMBIN TIME: 33 SECONDS (ref 10–13.8)

## 2020-03-26 PROCEDURE — G0463 HOSPITAL OUTPT CLINIC VISIT: HCPCS

## 2020-03-26 PROCEDURE — 36416 COLLJ CAPILLARY BLOOD SPEC: CPT

## 2020-03-26 PROCEDURE — 85610 PROTHROMBIN TIME: CPT

## 2020-03-26 RX ORDER — WARFARIN SODIUM 5 MG/1
TABLET ORAL
Qty: 110 TABLET | Refills: 1 | Status: SHIPPED | OUTPATIENT
Start: 2020-03-26 | End: 2020-12-21

## 2020-03-27 RX ORDER — WARFARIN SODIUM 5 MG/1
TABLET ORAL
Qty: 90 TABLET | OUTPATIENT
Start: 2020-03-27

## 2020-04-06 RX ORDER — FOLIC ACID 1 MG/1
TABLET ORAL
Qty: 90 TABLET | Refills: 0 | Status: SHIPPED | OUTPATIENT
Start: 2020-04-06 | End: 2020-07-23

## 2020-04-09 ENCOUNTER — TELEPHONE (OUTPATIENT)
Dept: CARDIOLOGY | Facility: CLINIC | Age: 85
End: 2020-04-09

## 2020-04-16 ENCOUNTER — ANTICOAGULATION VISIT (OUTPATIENT)
Dept: PHARMACY | Facility: HOSPITAL | Age: 85
End: 2020-04-16

## 2020-04-16 ENCOUNTER — TELEPHONE (OUTPATIENT)
Dept: PHARMACY | Facility: HOSPITAL | Age: 85
End: 2020-04-16

## 2020-04-16 LAB — INR PPP: 2.5

## 2020-05-07 ENCOUNTER — TELEPHONE (OUTPATIENT)
Dept: PHARMACY | Facility: HOSPITAL | Age: 85
End: 2020-05-07

## 2020-05-07 ENCOUNTER — ANTICOAGULATION VISIT (OUTPATIENT)
Dept: PHARMACY | Facility: HOSPITAL | Age: 85
End: 2020-05-07

## 2020-05-07 LAB — INR PPP: 2.4

## 2020-05-08 RX ORDER — CARVEDILOL 25 MG/1
TABLET ORAL
Qty: 60 TABLET | Refills: 0 | Status: SHIPPED | OUTPATIENT
Start: 2020-05-08 | End: 2020-06-15

## 2020-05-14 LAB — INR PPP: 1

## 2020-05-15 ENCOUNTER — ANTICOAGULATION VISIT (OUTPATIENT)
Dept: PHARMACY | Facility: HOSPITAL | Age: 85
End: 2020-05-15

## 2020-05-23 LAB — INR PPP: 1.6

## 2020-05-26 ENCOUNTER — ANTICOAGULATION VISIT (OUTPATIENT)
Dept: PHARMACY | Facility: HOSPITAL | Age: 85
End: 2020-05-26

## 2020-05-27 LAB — INR PPP: 1.8

## 2020-05-28 ENCOUNTER — ANTICOAGULATION VISIT (OUTPATIENT)
Dept: PHARMACY | Facility: HOSPITAL | Age: 85
End: 2020-05-28

## 2020-06-13 LAB — INR PPP: 2

## 2020-06-15 ENCOUNTER — ANTICOAGULATION VISIT (OUTPATIENT)
Dept: PHARMACY | Facility: HOSPITAL | Age: 85
End: 2020-06-15

## 2020-06-15 RX ORDER — CARVEDILOL 25 MG/1
TABLET ORAL
Qty: 60 TABLET | Refills: 0 | Status: SHIPPED | OUTPATIENT
Start: 2020-06-15 | End: 2020-07-23

## 2020-07-07 LAB — INR PPP: 1.6

## 2020-07-08 ENCOUNTER — ANTICOAGULATION VISIT (OUTPATIENT)
Dept: PHARMACY | Facility: HOSPITAL | Age: 85
End: 2020-07-08

## 2020-07-13 RX ORDER — FOLIC ACID 1 MG/1
TABLET ORAL
Qty: 90 TABLET | Refills: 0 | OUTPATIENT
Start: 2020-07-13

## 2020-07-23 RX ORDER — FOLIC ACID 1 MG/1
TABLET ORAL
Qty: 90 TABLET | Refills: 0 | Status: SHIPPED | OUTPATIENT
Start: 2020-07-23 | End: 2020-10-13

## 2020-07-23 RX ORDER — CARVEDILOL 25 MG/1
TABLET ORAL
Qty: 60 TABLET | Refills: 0 | Status: SHIPPED | OUTPATIENT
Start: 2020-07-23 | End: 2020-08-25

## 2020-07-24 LAB — INR PPP: 2.4

## 2020-07-27 ENCOUNTER — ANTICOAGULATION VISIT (OUTPATIENT)
Dept: PHARMACY | Facility: HOSPITAL | Age: 85
End: 2020-07-27

## 2020-07-31 ENCOUNTER — OFFICE VISIT (OUTPATIENT)
Dept: INTERNAL MEDICINE | Facility: CLINIC | Age: 85
End: 2020-07-31

## 2020-07-31 VITALS
DIASTOLIC BLOOD PRESSURE: 64 MMHG | BODY MASS INDEX: 34.12 KG/M2 | TEMPERATURE: 97.8 F | HEIGHT: 70 IN | WEIGHT: 238.3 LBS | HEART RATE: 64 BPM | SYSTOLIC BLOOD PRESSURE: 124 MMHG | RESPIRATION RATE: 18 BRPM | OXYGEN SATURATION: 96 %

## 2020-07-31 DIAGNOSIS — R80.9 MICROALBUMINURIA: ICD-10-CM

## 2020-07-31 DIAGNOSIS — I10 ESSENTIAL HYPERTENSION: ICD-10-CM

## 2020-07-31 DIAGNOSIS — E11.29 CONTROLLED TYPE 2 DIABETES MELLITUS WITH MICROALBUMINURIA, WITHOUT LONG-TERM CURRENT USE OF INSULIN (HCC): ICD-10-CM

## 2020-07-31 DIAGNOSIS — Z85.828 HISTORY OF SCC (SQUAMOUS CELL CARCINOMA) OF SKIN: ICD-10-CM

## 2020-07-31 DIAGNOSIS — L57.0 ACTINIC KERATOSIS: ICD-10-CM

## 2020-07-31 DIAGNOSIS — G47.33 OBSTRUCTIVE SLEEP APNEA SYNDROME: Primary | ICD-10-CM

## 2020-07-31 DIAGNOSIS — C61 PROSTATE CANCER (HCC): ICD-10-CM

## 2020-07-31 DIAGNOSIS — K92.2 CHRONIC GI BLEEDING: ICD-10-CM

## 2020-07-31 DIAGNOSIS — R80.9 CONTROLLED TYPE 2 DIABETES MELLITUS WITH MICROALBUMINURIA, WITHOUT LONG-TERM CURRENT USE OF INSULIN (HCC): ICD-10-CM

## 2020-07-31 DIAGNOSIS — I48.91 ATRIAL FIBRILLATION, UNSPECIFIED TYPE (HCC): ICD-10-CM

## 2020-07-31 PROCEDURE — 99214 OFFICE O/P EST MOD 30 MIN: CPT | Performed by: INTERNAL MEDICINE

## 2020-08-01 LAB
ALBUMIN SERPL-MCNC: 4.2 G/DL (ref 3.5–5.2)
ALBUMIN/CREAT UR: 15 MG/G CREAT (ref 0–29)
ALBUMIN/GLOB SERPL: 2.1 G/DL
ALP SERPL-CCNC: 58 U/L (ref 39–117)
ALT SERPL-CCNC: 27 U/L (ref 1–41)
APPEARANCE UR: CLEAR
AST SERPL-CCNC: 21 U/L (ref 1–40)
BACTERIA #/AREA URNS HPF: NORMAL /HPF
BASOPHILS # BLD AUTO: 0.02 10*3/MM3 (ref 0–0.2)
BASOPHILS NFR BLD AUTO: 0.4 % (ref 0–1.5)
BILIRUB SERPL-MCNC: 0.4 MG/DL (ref 0–1.2)
BILIRUB UR QL STRIP: NEGATIVE
BUN SERPL-MCNC: 17 MG/DL (ref 8–23)
BUN/CREAT SERPL: 15.3 (ref 7–25)
CALCIUM SERPL-MCNC: 9.4 MG/DL (ref 8.2–9.6)
CHLORIDE SERPL-SCNC: 108 MMOL/L (ref 98–107)
CO2 SERPL-SCNC: 28.9 MMOL/L (ref 22–29)
COLOR UR: YELLOW
CREAT SERPL-MCNC: 1.11 MG/DL (ref 0.76–1.27)
CREAT UR-MCNC: 145 MG/DL
EOSINOPHIL # BLD AUTO: 0.2 10*3/MM3 (ref 0–0.4)
EOSINOPHIL NFR BLD AUTO: 4.2 % (ref 0.3–6.2)
EPI CELLS #/AREA URNS HPF: NORMAL /HPF (ref 0–10)
ERYTHROCYTE [DISTWIDTH] IN BLOOD BY AUTOMATED COUNT: 12.9 % (ref 12.3–15.4)
FERRITIN SERPL-MCNC: 157 NG/ML (ref 30–400)
GLOBULIN SER CALC-MCNC: 2 GM/DL
GLUCOSE SERPL-MCNC: 124 MG/DL (ref 65–99)
GLUCOSE UR QL: NEGATIVE
HBA1C MFR BLD: 6.1 % (ref 4.8–5.6)
HCT VFR BLD AUTO: 36.8 % (ref 37.5–51)
HGB BLD-MCNC: 11.7 G/DL (ref 13–17.7)
HGB UR QL STRIP: NEGATIVE
IMM GRANULOCYTES # BLD AUTO: 0.02 10*3/MM3 (ref 0–0.05)
IMM GRANULOCYTES NFR BLD AUTO: 0.4 % (ref 0–0.5)
KETONES UR QL STRIP: NEGATIVE
LEUKOCYTE ESTERASE UR QL STRIP: NEGATIVE
LYMPHOCYTES # BLD AUTO: 1.3 10*3/MM3 (ref 0.7–3.1)
LYMPHOCYTES NFR BLD AUTO: 27.3 % (ref 19.6–45.3)
MCH RBC QN AUTO: 33.2 PG (ref 26.6–33)
MCHC RBC AUTO-ENTMCNC: 31.8 G/DL (ref 31.5–35.7)
MCV RBC AUTO: 104.5 FL (ref 79–97)
MICRO URNS: NORMAL
MICRO URNS: NORMAL
MICROALBUMIN UR-MCNC: 21.7 UG/ML
MONOCYTES # BLD AUTO: 0.6 10*3/MM3 (ref 0.1–0.9)
MONOCYTES NFR BLD AUTO: 12.6 % (ref 5–12)
MUCOUS THREADS URNS QL MICRO: PRESENT /HPF
NEUTROPHILS # BLD AUTO: 2.63 10*3/MM3 (ref 1.7–7)
NEUTROPHILS NFR BLD AUTO: 55.1 % (ref 42.7–76)
NITRITE UR QL STRIP: NEGATIVE
NRBC BLD AUTO-RTO: 0 /100 WBC (ref 0–0.2)
PH UR STRIP: 5 [PH] (ref 5–7.5)
PLATELET # BLD AUTO: 175 10*3/MM3 (ref 140–450)
POTASSIUM SERPL-SCNC: 5.6 MMOL/L (ref 3.5–5.2)
PROT SERPL-MCNC: 6.2 G/DL (ref 6–8.5)
PROT UR QL STRIP: NEGATIVE
RBC # BLD AUTO: 3.52 10*6/MM3 (ref 4.14–5.8)
RBC #/AREA URNS HPF: NORMAL /HPF (ref 0–2)
SODIUM SERPL-SCNC: 145 MMOL/L (ref 136–145)
SP GR UR: 1.02 (ref 1–1.03)
URINALYSIS REFLEX: NORMAL
UROBILINOGEN UR STRIP-MCNC: 0.2 MG/DL (ref 0.2–1)
WBC # BLD AUTO: 4.77 10*3/MM3 (ref 3.4–10.8)
WBC #/AREA URNS HPF: NORMAL /HPF (ref 0–5)

## 2020-08-04 DIAGNOSIS — E87.5 HYPERKALEMIA: Primary | ICD-10-CM

## 2020-08-05 RX ORDER — ATORVASTATIN CALCIUM 20 MG/1
TABLET, FILM COATED ORAL
Qty: 90 TABLET | Refills: 0 | Status: SHIPPED | OUTPATIENT
Start: 2020-08-05 | End: 2020-10-20

## 2020-08-06 ENCOUNTER — TELEPHONE (OUTPATIENT)
Dept: INTERNAL MEDICINE | Facility: CLINIC | Age: 85
End: 2020-08-06

## 2020-08-16 LAB — INR PPP: 1.8

## 2020-08-17 ENCOUNTER — ANTICOAGULATION VISIT (OUTPATIENT)
Dept: PHARMACY | Facility: HOSPITAL | Age: 85
End: 2020-08-17

## 2020-08-25 RX ORDER — CARVEDILOL 25 MG/1
TABLET ORAL
Qty: 60 TABLET | Refills: 3 | Status: SHIPPED | OUTPATIENT
Start: 2020-08-25 | End: 2021-01-04

## 2020-09-04 ENCOUNTER — ANTICOAGULATION VISIT (OUTPATIENT)
Dept: PHARMACY | Facility: HOSPITAL | Age: 85
End: 2020-09-04

## 2020-09-04 LAB
INR PPP: 1.5 (ref 0.91–1.09)
PROTHROMBIN TIME: 17.8 SECONDS (ref 10–13.8)

## 2020-09-04 PROCEDURE — 85610 PROTHROMBIN TIME: CPT

## 2020-09-04 PROCEDURE — 36416 COLLJ CAPILLARY BLOOD SPEC: CPT

## 2020-09-04 PROCEDURE — G0463 HOSPITAL OUTPT CLINIC VISIT: HCPCS

## 2020-09-21 RX ORDER — FUROSEMIDE 20 MG/1
TABLET ORAL
Qty: 90 TABLET | Refills: 1 | Status: SHIPPED | OUTPATIENT
Start: 2020-09-21 | End: 2021-07-30 | Stop reason: SDUPTHER

## 2020-10-13 RX ORDER — FOLIC ACID 1 MG/1
TABLET ORAL
Qty: 90 TABLET | Refills: 1 | Status: SHIPPED | OUTPATIENT
Start: 2020-10-13 | End: 2020-10-21

## 2020-10-20 RX ORDER — ATORVASTATIN CALCIUM 20 MG/1
TABLET, FILM COATED ORAL
Qty: 90 TABLET | Refills: 2 | Status: SHIPPED | OUTPATIENT
Start: 2020-10-20 | End: 2021-06-04

## 2020-10-21 ENCOUNTER — TELEPHONE (OUTPATIENT)
Dept: PHARMACY | Facility: HOSPITAL | Age: 85
End: 2020-10-21

## 2020-10-21 RX ORDER — FOLIC ACID 1 MG/1
TABLET ORAL
Qty: 90 TABLET | Refills: 0 | Status: SHIPPED | OUTPATIENT
Start: 2020-10-21 | End: 2021-07-26

## 2020-11-03 ENCOUNTER — TELEPHONE (OUTPATIENT)
Dept: PHARMACY | Facility: HOSPITAL | Age: 85
End: 2020-11-03

## 2020-11-15 LAB — INR PPP: 1.8

## 2020-11-16 ENCOUNTER — ANTICOAGULATION VISIT (OUTPATIENT)
Dept: PHARMACY | Facility: HOSPITAL | Age: 85
End: 2020-11-16

## 2020-11-19 DIAGNOSIS — E55.9 VITAMIN D DEFICIENCY: ICD-10-CM

## 2020-11-19 DIAGNOSIS — E79.0 ELEVATED BLOOD URIC ACID LEVEL: ICD-10-CM

## 2020-11-19 DIAGNOSIS — I10 ESSENTIAL HYPERTENSION: ICD-10-CM

## 2020-11-19 DIAGNOSIS — E11.29 CONTROLLED TYPE 2 DIABETES MELLITUS WITH MICROALBUMINURIA, WITHOUT LONG-TERM CURRENT USE OF INSULIN (HCC): ICD-10-CM

## 2020-11-19 DIAGNOSIS — Z00.00 HEALTHCARE MAINTENANCE: Primary | ICD-10-CM

## 2020-11-19 DIAGNOSIS — R80.9 CONTROLLED TYPE 2 DIABETES MELLITUS WITH MICROALBUMINURIA, WITHOUT LONG-TERM CURRENT USE OF INSULIN (HCC): ICD-10-CM

## 2020-12-21 RX ORDER — WARFARIN SODIUM 5 MG/1
TABLET ORAL
Qty: 110 TABLET | Refills: 0 | Status: SHIPPED | OUTPATIENT
Start: 2020-12-21 | End: 2021-03-31

## 2020-12-26 LAB — INR PPP: 1.3

## 2020-12-28 ENCOUNTER — ANTICOAGULATION VISIT (OUTPATIENT)
Dept: PHARMACY | Facility: HOSPITAL | Age: 85
End: 2020-12-28

## 2021-01-04 RX ORDER — CARVEDILOL 25 MG/1
TABLET ORAL
Qty: 60 TABLET | Refills: 2 | Status: SHIPPED | OUTPATIENT
Start: 2021-01-04 | End: 2021-01-05

## 2021-01-05 RX ORDER — CARVEDILOL 25 MG/1
TABLET ORAL
Qty: 60 TABLET | Refills: 2 | Status: SHIPPED | OUTPATIENT
Start: 2021-01-05 | End: 2021-07-19

## 2021-01-09 LAB — INR PPP: 1.8

## 2021-01-11 ENCOUNTER — ANTICOAGULATION VISIT (OUTPATIENT)
Dept: PHARMACY | Facility: HOSPITAL | Age: 86
End: 2021-01-11

## 2021-01-30 LAB — INR PPP: 1.7

## 2021-02-01 ENCOUNTER — ANTICOAGULATION VISIT (OUTPATIENT)
Dept: PHARMACY | Facility: HOSPITAL | Age: 86
End: 2021-02-01

## 2021-03-15 ENCOUNTER — BULK ORDERING (OUTPATIENT)
Dept: CASE MANAGEMENT | Facility: OTHER | Age: 86
End: 2021-03-15

## 2021-03-15 ENCOUNTER — TELEPHONE (OUTPATIENT)
Dept: PHARMACY | Facility: HOSPITAL | Age: 86
End: 2021-03-15

## 2021-03-15 DIAGNOSIS — Z23 IMMUNIZATION DUE: ICD-10-CM

## 2021-03-22 ENCOUNTER — TELEPHONE (OUTPATIENT)
Dept: PHARMACY | Facility: HOSPITAL | Age: 86
End: 2021-03-22

## 2021-03-30 LAB — INR PPP: 1.9

## 2021-03-31 ENCOUNTER — ANTICOAGULATION VISIT (OUTPATIENT)
Dept: PHARMACY | Facility: HOSPITAL | Age: 86
End: 2021-03-31

## 2021-03-31 RX ORDER — WARFARIN SODIUM 5 MG/1
TABLET ORAL
Qty: 97 TABLET | Refills: 0 | Status: SHIPPED | OUTPATIENT
Start: 2021-03-31 | End: 2021-07-12 | Stop reason: SDUPTHER

## 2021-04-15 ENCOUNTER — OFFICE VISIT (OUTPATIENT)
Dept: INTERNAL MEDICINE | Facility: CLINIC | Age: 86
End: 2021-04-15

## 2021-04-15 VITALS
HEIGHT: 70 IN | OXYGEN SATURATION: 95 % | DIASTOLIC BLOOD PRESSURE: 60 MMHG | BODY MASS INDEX: 34.07 KG/M2 | HEART RATE: 91 BPM | WEIGHT: 238 LBS | TEMPERATURE: 97.3 F | SYSTOLIC BLOOD PRESSURE: 130 MMHG

## 2021-04-15 DIAGNOSIS — R35.1 NOCTURIA: ICD-10-CM

## 2021-04-15 DIAGNOSIS — G47.33 OBSTRUCTIVE SLEEP APNEA SYNDROME: ICD-10-CM

## 2021-04-15 DIAGNOSIS — I10 ESSENTIAL HYPERTENSION: Primary | ICD-10-CM

## 2021-04-15 DIAGNOSIS — E11.29 CONTROLLED TYPE 2 DIABETES MELLITUS WITH MICROALBUMINURIA, WITHOUT LONG-TERM CURRENT USE OF INSULIN (HCC): ICD-10-CM

## 2021-04-15 DIAGNOSIS — H91.93 BILATERAL HEARING LOSS, UNSPECIFIED HEARING LOSS TYPE: ICD-10-CM

## 2021-04-15 DIAGNOSIS — Z00.00 HEALTHCARE MAINTENANCE: ICD-10-CM

## 2021-04-15 DIAGNOSIS — R80.9 CONTROLLED TYPE 2 DIABETES MELLITUS WITH MICROALBUMINURIA, WITHOUT LONG-TERM CURRENT USE OF INSULIN (HCC): ICD-10-CM

## 2021-04-15 DIAGNOSIS — C61 PROSTATE CANCER (HCC): ICD-10-CM

## 2021-04-15 PROCEDURE — 99214 OFFICE O/P EST MOD 30 MIN: CPT | Performed by: INTERNAL MEDICINE

## 2021-04-30 ENCOUNTER — TELEPHONE (OUTPATIENT)
Dept: PHARMACY | Facility: HOSPITAL | Age: 86
End: 2021-04-30

## 2021-05-12 ENCOUNTER — TELEPHONE (OUTPATIENT)
Dept: PHARMACY | Facility: HOSPITAL | Age: 86
End: 2021-05-12

## 2021-06-04 RX ORDER — ATORVASTATIN CALCIUM 20 MG/1
TABLET, FILM COATED ORAL
Qty: 90 TABLET | Refills: 2 | Status: SHIPPED | OUTPATIENT
Start: 2021-06-04 | End: 2022-02-01

## 2021-06-24 ENCOUNTER — TELEPHONE (OUTPATIENT)
Dept: PHARMACY | Facility: HOSPITAL | Age: 86
End: 2021-06-24

## 2021-06-29 RX ORDER — WARFARIN SODIUM 5 MG/1
TABLET ORAL
Qty: 97 TABLET | Refills: 0 | OUTPATIENT
Start: 2021-06-29

## 2021-07-01 ENCOUNTER — TELEPHONE (OUTPATIENT)
Dept: PHARMACY | Facility: HOSPITAL | Age: 86
End: 2021-07-01

## 2021-07-06 DIAGNOSIS — I10 ESSENTIAL HYPERTENSION: Primary | ICD-10-CM

## 2021-07-06 DIAGNOSIS — R80.9 CONTROLLED TYPE 2 DIABETES MELLITUS WITH MICROALBUMINURIA, WITHOUT LONG-TERM CURRENT USE OF INSULIN (HCC): ICD-10-CM

## 2021-07-06 DIAGNOSIS — E11.29 CONTROLLED TYPE 2 DIABETES MELLITUS WITH MICROALBUMINURIA, WITHOUT LONG-TERM CURRENT USE OF INSULIN (HCC): ICD-10-CM

## 2021-07-06 DIAGNOSIS — E55.9 VITAMIN D DEFICIENCY: ICD-10-CM

## 2021-07-09 ENCOUNTER — TELEPHONE (OUTPATIENT)
Dept: CARDIOLOGY | Facility: CLINIC | Age: 86
End: 2021-07-09

## 2021-07-09 RX ORDER — WARFARIN SODIUM 5 MG/1
TABLET ORAL
Qty: 97 TABLET | Refills: 0 | OUTPATIENT
Start: 2021-07-09

## 2021-07-10 LAB
ALBUMIN SERPL-MCNC: 4.2 G/DL (ref 3.5–5.2)
ALBUMIN/GLOB SERPL: 1.9 G/DL
ALP SERPL-CCNC: 75 U/L (ref 39–117)
ALT SERPL-CCNC: 23 U/L (ref 1–41)
APPEARANCE UR: CLEAR
AST SERPL-CCNC: 20 U/L (ref 1–40)
BACTERIA #/AREA URNS HPF: NORMAL /HPF
BASOPHILS # BLD AUTO: 0.02 10*3/MM3 (ref 0–0.2)
BASOPHILS NFR BLD AUTO: 0.5 % (ref 0–1.5)
BILIRUB SERPL-MCNC: 0.8 MG/DL (ref 0–1.2)
BILIRUB UR QL STRIP: NEGATIVE
BUN SERPL-MCNC: 21 MG/DL (ref 8–23)
BUN/CREAT SERPL: 19.6 (ref 7–25)
CALCIUM SERPL-MCNC: 10.5 MG/DL (ref 8.2–9.6)
CASTS URNS QL MICRO: NORMAL /LPF
CHLORIDE SERPL-SCNC: 106 MMOL/L (ref 98–107)
CHOLEST SERPL-MCNC: 79 MG/DL (ref 0–200)
CO2 SERPL-SCNC: 28.1 MMOL/L (ref 22–29)
COLOR UR: YELLOW
CREAT SERPL-MCNC: 1.07 MG/DL (ref 0.76–1.27)
EOSINOPHIL # BLD AUTO: 0.13 10*3/MM3 (ref 0–0.4)
EOSINOPHIL NFR BLD AUTO: 2.9 % (ref 0.3–6.2)
EPI CELLS #/AREA URNS HPF: NORMAL /HPF (ref 0–10)
ERYTHROCYTE [DISTWIDTH] IN BLOOD BY AUTOMATED COUNT: 13.1 % (ref 12.3–15.4)
GLOBULIN SER CALC-MCNC: 2.2 GM/DL
GLUCOSE SERPL-MCNC: 135 MG/DL (ref 65–99)
GLUCOSE UR QL: NEGATIVE
HBA1C MFR BLD: 6.3 % (ref 4.8–5.6)
HCT VFR BLD AUTO: 34.5 % (ref 37.5–51)
HDLC SERPL-MCNC: 38 MG/DL (ref 40–60)
HGB BLD-MCNC: 11.2 G/DL (ref 13–17.7)
HGB UR QL STRIP: NEGATIVE
IMM GRANULOCYTES # BLD AUTO: 0.01 10*3/MM3 (ref 0–0.05)
IMM GRANULOCYTES NFR BLD AUTO: 0.2 % (ref 0–0.5)
KETONES UR QL STRIP: NEGATIVE
LDLC SERPL CALC-MCNC: 24 MG/DL (ref 0–100)
LEUKOCYTE ESTERASE UR QL STRIP: NEGATIVE
LYMPHOCYTES # BLD AUTO: 1 10*3/MM3 (ref 0.7–3.1)
LYMPHOCYTES NFR BLD AUTO: 22.7 % (ref 19.6–45.3)
MCH RBC QN AUTO: 33 PG (ref 26.6–33)
MCHC RBC AUTO-ENTMCNC: 32.5 G/DL (ref 31.5–35.7)
MCV RBC AUTO: 101.8 FL (ref 79–97)
MICRO URNS: NORMAL
MICRO URNS: NORMAL
MONOCYTES # BLD AUTO: 0.47 10*3/MM3 (ref 0.1–0.9)
MONOCYTES NFR BLD AUTO: 10.7 % (ref 5–12)
NEUTROPHILS # BLD AUTO: 2.78 10*3/MM3 (ref 1.7–7)
NEUTROPHILS NFR BLD AUTO: 63 % (ref 42.7–76)
NITRITE UR QL STRIP: NEGATIVE
NRBC BLD AUTO-RTO: 0 /100 WBC (ref 0–0.2)
PH UR STRIP: 5.5 [PH] (ref 5–7.5)
PLATELET # BLD AUTO: 161 10*3/MM3 (ref 140–450)
POTASSIUM SERPL-SCNC: 5.6 MMOL/L (ref 3.5–5.2)
PROT SERPL-MCNC: 6.4 G/DL (ref 6–8.5)
PROT UR QL STRIP: NEGATIVE
RBC # BLD AUTO: 3.39 10*6/MM3 (ref 4.14–5.8)
RBC #/AREA URNS HPF: NORMAL /HPF (ref 0–2)
SODIUM SERPL-SCNC: 142 MMOL/L (ref 136–145)
SP GR UR: 1.02 (ref 1–1.03)
TRIGL SERPL-MCNC: 86 MG/DL (ref 0–150)
TSH SERPL DL<=0.005 MIU/L-ACNC: 2 UIU/ML (ref 0.27–4.2)
URINALYSIS REFLEX: NORMAL
UROBILINOGEN UR STRIP-MCNC: 0.2 MG/DL (ref 0.2–1)
VLDLC SERPL CALC-MCNC: 17 MG/DL (ref 5–40)
WBC # BLD AUTO: 4.41 10*3/MM3 (ref 3.4–10.8)
WBC #/AREA URNS HPF: NORMAL /HPF (ref 0–5)

## 2021-07-11 LAB — INR PPP: 1.2

## 2021-07-12 ENCOUNTER — ANTICOAGULATION VISIT (OUTPATIENT)
Dept: PHARMACY | Facility: HOSPITAL | Age: 86
End: 2021-07-12

## 2021-07-12 RX ORDER — WARFARIN SODIUM 5 MG/1
TABLET ORAL
Qty: 97 TABLET | Refills: 0 | Status: SHIPPED | OUTPATIENT
Start: 2021-07-12 | End: 2021-10-11

## 2021-07-16 ENCOUNTER — OFFICE VISIT (OUTPATIENT)
Dept: INTERNAL MEDICINE | Facility: CLINIC | Age: 86
End: 2021-07-16

## 2021-07-16 VITALS
HEART RATE: 95 BPM | HEIGHT: 70 IN | WEIGHT: 234 LBS | DIASTOLIC BLOOD PRESSURE: 76 MMHG | SYSTOLIC BLOOD PRESSURE: 124 MMHG | TEMPERATURE: 97.1 F | OXYGEN SATURATION: 98 % | BODY MASS INDEX: 33.5 KG/M2

## 2021-07-16 DIAGNOSIS — I49.5 SICK SINUS SYNDROME (HCC): ICD-10-CM

## 2021-07-16 DIAGNOSIS — L57.0 ACTINIC KERATOSIS: ICD-10-CM

## 2021-07-16 DIAGNOSIS — E83.52 HYPERCALCEMIA: ICD-10-CM

## 2021-07-16 DIAGNOSIS — G40.909 SEIZURE DISORDER (HCC): ICD-10-CM

## 2021-07-16 DIAGNOSIS — C44.320 SCC (SQUAMOUS CELL CARCINOMA), FACE: ICD-10-CM

## 2021-07-16 DIAGNOSIS — I42.1 HYPERTROPHIC OBSTRUCTIVE CARDIOMYOPATHY (HCC): ICD-10-CM

## 2021-07-16 DIAGNOSIS — I71.21 ANEURYSM OF AORTIC ROOT (HCC): ICD-10-CM

## 2021-07-16 DIAGNOSIS — H91.93 BILATERAL HEARING LOSS, UNSPECIFIED HEARING LOSS TYPE: ICD-10-CM

## 2021-07-16 DIAGNOSIS — I99.9 VASCULAR DISORDER: ICD-10-CM

## 2021-07-16 DIAGNOSIS — R80.9 CONTROLLED TYPE 2 DIABETES MELLITUS WITH MICROALBUMINURIA, WITHOUT LONG-TERM CURRENT USE OF INSULIN (HCC): ICD-10-CM

## 2021-07-16 DIAGNOSIS — D12.6 TUBULAR ADENOMA OF COLON: ICD-10-CM

## 2021-07-16 DIAGNOSIS — I63.40 CEREBROVASCULAR ACCIDENT (CVA) DUE TO EMBOLISM OF CEREBRAL ARTERY (HCC): ICD-10-CM

## 2021-07-16 DIAGNOSIS — Z79.01 WARFARIN ANTICOAGULATION: ICD-10-CM

## 2021-07-16 DIAGNOSIS — E79.0 ELEVATED BLOOD URIC ACID LEVEL: ICD-10-CM

## 2021-07-16 DIAGNOSIS — I10 ESSENTIAL HYPERTENSION: ICD-10-CM

## 2021-07-16 DIAGNOSIS — E11.29 CONTROLLED TYPE 2 DIABETES MELLITUS WITH MICROALBUMINURIA, WITHOUT LONG-TERM CURRENT USE OF INSULIN (HCC): ICD-10-CM

## 2021-07-16 DIAGNOSIS — E87.5 HYPERKALEMIA: ICD-10-CM

## 2021-07-16 DIAGNOSIS — C61 PROSTATE CANCER (HCC): ICD-10-CM

## 2021-07-16 DIAGNOSIS — K21.9 GASTROESOPHAGEAL REFLUX DISEASE, UNSPECIFIED WHETHER ESOPHAGITIS PRESENT: ICD-10-CM

## 2021-07-16 DIAGNOSIS — Z00.00 HEALTHCARE MAINTENANCE: Primary | ICD-10-CM

## 2021-07-16 DIAGNOSIS — R41.3 MEMORY LOSS: ICD-10-CM

## 2021-07-16 DIAGNOSIS — G47.33 OBSTRUCTIVE SLEEP APNEA SYNDROME: ICD-10-CM

## 2021-07-16 DIAGNOSIS — I48.91 ATRIAL FIBRILLATION, UNSPECIFIED TYPE (HCC): ICD-10-CM

## 2021-07-16 DIAGNOSIS — N40.1 BENIGN NON-NODULAR PROSTATIC HYPERPLASIA WITH LOWER URINARY TRACT SYMPTOMS: ICD-10-CM

## 2021-07-16 PROBLEM — R40.0 DAYTIME SOMNOLENCE: Status: RESOLVED | Noted: 2017-05-18 | Resolved: 2021-07-16

## 2021-07-16 LAB — INR PPP: 1.6

## 2021-07-16 PROCEDURE — 96160 PT-FOCUSED HLTH RISK ASSMT: CPT | Performed by: INTERNAL MEDICINE

## 2021-07-16 PROCEDURE — 1159F MED LIST DOCD IN RCRD: CPT | Performed by: INTERNAL MEDICINE

## 2021-07-16 PROCEDURE — G0439 PPPS, SUBSEQ VISIT: HCPCS | Performed by: INTERNAL MEDICINE

## 2021-07-16 PROCEDURE — 99397 PER PM REEVAL EST PAT 65+ YR: CPT | Performed by: INTERNAL MEDICINE

## 2021-07-16 PROCEDURE — 1170F FXNL STATUS ASSESSED: CPT | Performed by: INTERNAL MEDICINE

## 2021-07-16 PROCEDURE — 1126F AMNT PAIN NOTED NONE PRSNT: CPT | Performed by: INTERNAL MEDICINE

## 2021-07-19 ENCOUNTER — ANTICOAGULATION VISIT (OUTPATIENT)
Dept: PHARMACY | Facility: HOSPITAL | Age: 86
End: 2021-07-19

## 2021-07-19 RX ORDER — CARVEDILOL 25 MG/1
TABLET ORAL
Qty: 60 TABLET | Refills: 5 | Status: SHIPPED | OUTPATIENT
Start: 2021-07-19 | End: 2021-07-23 | Stop reason: SDUPTHER

## 2021-07-23 RX ORDER — CARVEDILOL 25 MG/1
25 TABLET ORAL 2 TIMES DAILY WITH MEALS
Qty: 180 TABLET | Refills: 1 | Status: SHIPPED | OUTPATIENT
Start: 2021-07-23 | End: 2021-12-16

## 2021-07-26 RX ORDER — FOLIC ACID 1 MG/1
TABLET ORAL
Qty: 90 TABLET | Refills: 2 | Status: SHIPPED | OUTPATIENT
Start: 2021-07-26 | End: 2021-07-30 | Stop reason: SDUPTHER

## 2021-07-30 ENCOUNTER — TELEPHONE (OUTPATIENT)
Dept: INTERNAL MEDICINE | Facility: CLINIC | Age: 86
End: 2021-07-30

## 2021-07-30 RX ORDER — FUROSEMIDE 20 MG/1
10 TABLET ORAL DAILY
Qty: 90 TABLET | Refills: 1 | Status: SHIPPED | OUTPATIENT
Start: 2021-07-30 | End: 2022-05-31

## 2021-07-30 RX ORDER — FOLIC ACID 1 MG/1
1000 TABLET ORAL DAILY
Qty: 90 TABLET | Refills: 2 | Status: SHIPPED | OUTPATIENT
Start: 2021-07-30 | End: 2022-03-29

## 2021-08-30 ENCOUNTER — TELEPHONE (OUTPATIENT)
Dept: PHARMACY | Facility: HOSPITAL | Age: 86
End: 2021-08-30

## 2021-09-02 ENCOUNTER — TELEPHONE (OUTPATIENT)
Dept: PHARMACY | Facility: HOSPITAL | Age: 86
End: 2021-09-02

## 2021-10-11 RX ORDER — WARFARIN SODIUM 5 MG/1
TABLET ORAL
Qty: 30 TABLET | Refills: 0 | Status: SHIPPED | OUTPATIENT
Start: 2021-10-11 | End: 2021-11-15

## 2021-10-13 ENCOUNTER — ANTICOAGULATION VISIT (OUTPATIENT)
Dept: PHARMACY | Facility: HOSPITAL | Age: 86
End: 2021-10-13

## 2021-10-13 ENCOUNTER — LAB (OUTPATIENT)
Dept: LAB | Facility: HOSPITAL | Age: 86
End: 2021-10-13

## 2021-10-13 ENCOUNTER — TELEPHONE (OUTPATIENT)
Dept: CARDIOLOGY | Facility: CLINIC | Age: 86
End: 2021-10-13

## 2021-10-13 ENCOUNTER — TELEPHONE (OUTPATIENT)
Dept: PHARMACY | Facility: HOSPITAL | Age: 86
End: 2021-10-13

## 2021-10-13 DIAGNOSIS — I48.91 ATRIAL FIBRILLATION, UNSPECIFIED TYPE (HCC): Primary | ICD-10-CM

## 2021-10-13 LAB
INR PPP: 5.25 (ref 0.9–1.1)
INR PPP: 6 (ref 0.91–1.09)
INR PPP: 6.1 (ref 0.91–1.09)
PROTHROMBIN TIME: 48 SECONDS (ref 11.7–14.2)
PROTHROMBIN TIME: 72.3 SECONDS (ref 10–13.8)
PROTHROMBIN TIME: 72.7 SECONDS (ref 10–13.8)

## 2021-10-13 PROCEDURE — 80048 BASIC METABOLIC PNL TOTAL CA: CPT | Performed by: INTERNAL MEDICINE

## 2021-10-13 PROCEDURE — 82306 VITAMIN D 25 HYDROXY: CPT | Performed by: INTERNAL MEDICINE

## 2021-10-13 PROCEDURE — 84244 ASSAY OF RENIN: CPT | Performed by: INTERNAL MEDICINE

## 2021-10-13 PROCEDURE — 85610 PROTHROMBIN TIME: CPT

## 2021-10-13 PROCEDURE — 82088 ASSAY OF ALDOSTERONE: CPT | Performed by: INTERNAL MEDICINE

## 2021-10-13 PROCEDURE — G0463 HOSPITAL OUTPT CLINIC VISIT: HCPCS

## 2021-10-13 PROCEDURE — 36416 COLLJ CAPILLARY BLOOD SPEC: CPT

## 2021-10-13 PROCEDURE — 82330 ASSAY OF CALCIUM: CPT | Performed by: INTERNAL MEDICINE

## 2021-10-13 PROCEDURE — 83970 ASSAY OF PARATHORMONE: CPT | Performed by: INTERNAL MEDICINE

## 2021-10-19 ENCOUNTER — ANTICOAGULATION VISIT (OUTPATIENT)
Dept: PHARMACY | Facility: HOSPITAL | Age: 86
End: 2021-10-19

## 2021-10-19 LAB
INR PPP: 1.9 (ref 0.91–1.09)
PROTHROMBIN TIME: 23.1 SECONDS (ref 10–13.8)

## 2021-10-19 PROCEDURE — G0463 HOSPITAL OUTPT CLINIC VISIT: HCPCS

## 2021-10-19 PROCEDURE — 85610 PROTHROMBIN TIME: CPT

## 2021-10-19 PROCEDURE — 36416 COLLJ CAPILLARY BLOOD SPEC: CPT

## 2021-11-15 RX ORDER — WARFARIN SODIUM 5 MG/1
TABLET ORAL
Qty: 30 TABLET | Refills: 0 | Status: SHIPPED | OUTPATIENT
Start: 2021-11-15 | End: 2021-12-16

## 2021-11-16 ENCOUNTER — IMMUNIZATION (OUTPATIENT)
Dept: VACCINE CLINIC | Facility: HOSPITAL | Age: 86
End: 2021-11-16

## 2021-11-16 ENCOUNTER — ANTICOAGULATION VISIT (OUTPATIENT)
Dept: PHARMACY | Facility: HOSPITAL | Age: 86
End: 2021-11-16

## 2021-11-16 LAB
INR PPP: 2.4 (ref 0.91–1.09)
PROTHROMBIN TIME: 28.8 SECONDS (ref 10–13.8)

## 2021-11-16 PROCEDURE — 36416 COLLJ CAPILLARY BLOOD SPEC: CPT

## 2021-11-16 PROCEDURE — 0004A ADM SARSCOV2 30MCG/0.3ML BOOSTER: CPT | Performed by: INTERNAL MEDICINE

## 2021-11-16 PROCEDURE — 91300 HC SARSCOV02 VAC 30MCG/0.3ML IM: CPT | Performed by: INTERNAL MEDICINE

## 2021-11-16 PROCEDURE — 85610 PROTHROMBIN TIME: CPT

## 2021-12-16 RX ORDER — CARVEDILOL 25 MG/1
TABLET ORAL
Qty: 180 TABLET | Refills: 1 | Status: SHIPPED | OUTPATIENT
Start: 2021-12-16 | End: 2022-07-13

## 2021-12-16 RX ORDER — WARFARIN SODIUM 5 MG/1
TABLET ORAL
Qty: 85 TABLET | Refills: 0 | Status: SHIPPED | OUTPATIENT
Start: 2021-12-16 | End: 2022-03-15 | Stop reason: SDUPTHER

## 2021-12-17 ENCOUNTER — ANTICOAGULATION VISIT (OUTPATIENT)
Dept: PHARMACY | Facility: HOSPITAL | Age: 86
End: 2021-12-17

## 2021-12-17 LAB
INR PPP: 2.4 (ref 0.91–1.09)
PROTHROMBIN TIME: 29.3 SECONDS (ref 10–13.8)

## 2021-12-17 PROCEDURE — 85610 PROTHROMBIN TIME: CPT

## 2021-12-17 PROCEDURE — 36416 COLLJ CAPILLARY BLOOD SPEC: CPT

## 2021-12-29 ENCOUNTER — CLINICAL SUPPORT NO REQUIREMENTS (OUTPATIENT)
Dept: CARDIOLOGY | Facility: CLINIC | Age: 86
End: 2021-12-29

## 2021-12-29 ENCOUNTER — OFFICE VISIT (OUTPATIENT)
Dept: CARDIOLOGY | Facility: CLINIC | Age: 86
End: 2021-12-29

## 2021-12-29 VITALS
BODY MASS INDEX: 32.21 KG/M2 | DIASTOLIC BLOOD PRESSURE: 60 MMHG | HEART RATE: 85 BPM | SYSTOLIC BLOOD PRESSURE: 120 MMHG | WEIGHT: 225 LBS | HEIGHT: 70 IN

## 2021-12-29 DIAGNOSIS — I71.20 THORACIC AORTIC ANEURYSM WITHOUT RUPTURE: ICD-10-CM

## 2021-12-29 DIAGNOSIS — R06.00 PAROXYSMAL NOCTURNAL DYSPNEA: ICD-10-CM

## 2021-12-29 DIAGNOSIS — I71.21 ANEURYSM OF AORTIC ROOT: ICD-10-CM

## 2021-12-29 DIAGNOSIS — I48.91 ATRIAL FIBRILLATION, UNSPECIFIED TYPE (HCC): Primary | ICD-10-CM

## 2021-12-29 DIAGNOSIS — I10 ESSENTIAL HYPERTENSION: ICD-10-CM

## 2021-12-29 DIAGNOSIS — Z79.01 WARFARIN ANTICOAGULATION: ICD-10-CM

## 2021-12-29 DIAGNOSIS — I48.91 ATRIAL FIBRILLATION, UNSPECIFIED TYPE: Primary | ICD-10-CM

## 2021-12-29 DIAGNOSIS — I49.5 SICK SINUS SYNDROME (HCC): ICD-10-CM

## 2021-12-29 PROCEDURE — 99213 OFFICE O/P EST LOW 20 MIN: CPT | Performed by: INTERNAL MEDICINE

## 2021-12-29 PROCEDURE — 93283 PRGRMG EVAL IMPLANTABLE DFB: CPT | Performed by: INTERNAL MEDICINE

## 2021-12-29 PROCEDURE — 93000 ELECTROCARDIOGRAM COMPLETE: CPT | Performed by: INTERNAL MEDICINE

## 2022-01-04 DIAGNOSIS — I10 ESSENTIAL HYPERTENSION: ICD-10-CM

## 2022-01-04 DIAGNOSIS — E11.29 CONTROLLED TYPE 2 DIABETES MELLITUS WITH MICROALBUMINURIA, WITHOUT LONG-TERM CURRENT USE OF INSULIN: Primary | ICD-10-CM

## 2022-01-04 DIAGNOSIS — I42.1 HYPERTROPHIC OBSTRUCTIVE CARDIOMYOPATHY: ICD-10-CM

## 2022-01-04 DIAGNOSIS — R80.9 CONTROLLED TYPE 2 DIABETES MELLITUS WITH MICROALBUMINURIA, WITHOUT LONG-TERM CURRENT USE OF INSULIN: Primary | ICD-10-CM

## 2022-01-12 LAB
ALBUMIN SERPL-MCNC: 4.2 G/DL (ref 3.5–4.6)
ALBUMIN/GLOB SERPL: 1.9 {RATIO} (ref 1.2–2.2)
ALP SERPL-CCNC: 82 IU/L (ref 44–121)
ALT SERPL-CCNC: 17 IU/L (ref 0–44)
AST SERPL-CCNC: 21 IU/L (ref 0–40)
BASOPHILS # BLD AUTO: 0 X10E3/UL (ref 0–0.2)
BASOPHILS NFR BLD AUTO: 1 %
BILIRUB SERPL-MCNC: 0.6 MG/DL (ref 0–1.2)
BUN SERPL-MCNC: 15 MG/DL (ref 10–36)
BUN/CREAT SERPL: 14 (ref 10–24)
CA-I SERPL ISE-MCNC: 5.3 MG/DL (ref 4.5–5.6)
CALCIUM SERPL-MCNC: 9.8 MG/DL (ref 8.6–10.2)
CHLORIDE SERPL-SCNC: 103 MMOL/L (ref 96–106)
CO2 SERPL-SCNC: 25 MMOL/L (ref 20–29)
CREAT SERPL-MCNC: 1.04 MG/DL (ref 0.76–1.27)
EOSINOPHIL # BLD AUTO: 0.2 X10E3/UL (ref 0–0.4)
EOSINOPHIL NFR BLD AUTO: 4 %
ERYTHROCYTE [DISTWIDTH] IN BLOOD BY AUTOMATED COUNT: 13.2 % (ref 11.6–15.4)
GLOBULIN SER CALC-MCNC: 2.2 G/DL (ref 1.5–4.5)
GLUCOSE SERPL-MCNC: 72 MG/DL (ref 65–99)
HBA1C MFR BLD: 6.8 % (ref 4.8–5.6)
HCT VFR BLD AUTO: 33.6 % (ref 37.5–51)
HGB BLD-MCNC: 11.4 G/DL (ref 13–17.7)
IMM GRANULOCYTES # BLD AUTO: 0 X10E3/UL (ref 0–0.1)
IMM GRANULOCYTES NFR BLD AUTO: 0 %
LYMPHOCYTES # BLD AUTO: 1.3 X10E3/UL (ref 0.7–3.1)
LYMPHOCYTES NFR BLD AUTO: 23 %
MCH RBC QN AUTO: 33.3 PG (ref 26.6–33)
MCHC RBC AUTO-ENTMCNC: 33.9 G/DL (ref 31.5–35.7)
MCV RBC AUTO: 98 FL (ref 79–97)
MONOCYTES # BLD AUTO: 0.7 X10E3/UL (ref 0.1–0.9)
MONOCYTES NFR BLD AUTO: 12 %
NEUTROPHILS # BLD AUTO: 3.5 X10E3/UL (ref 1.4–7)
NEUTROPHILS NFR BLD AUTO: 60 %
PLATELET # BLD AUTO: 174 X10E3/UL (ref 150–450)
POTASSIUM SERPL-SCNC: 5.1 MMOL/L (ref 3.5–5.2)
PROT SERPL-MCNC: 6.4 G/DL (ref 6–8.5)
RBC # BLD AUTO: 3.42 X10E6/UL (ref 4.14–5.8)
SODIUM SERPL-SCNC: 142 MMOL/L (ref 134–144)
WBC # BLD AUTO: 5.8 X10E3/UL (ref 3.4–10.8)

## 2022-01-15 LAB
FERRITIN SERPL-MCNC: 93 NG/ML (ref 30–400)
FOLATE SERPL-MCNC: >20 NG/ML
IRON SATN MFR SERPL: 29 % (ref 15–55)
IRON SERPL-MCNC: 91 UG/DL (ref 38–169)
Lab: NORMAL
TIBC SERPL-MCNC: 313 UG/DL (ref 250–450)
UIBC SERPL-MCNC: 222 UG/DL (ref 111–343)
VIT B12 SERPL-MCNC: 1114 PG/ML (ref 232–1245)
WRITTEN AUTHORIZATION: NORMAL

## 2022-01-18 ENCOUNTER — HOSPITAL ENCOUNTER (OUTPATIENT)
Dept: CT IMAGING | Facility: HOSPITAL | Age: 87
Discharge: HOME | End: 2022-01-18
Admitting: INTERNAL MEDICINE

## 2022-01-18 ENCOUNTER — OFFICE VISIT (OUTPATIENT)
Dept: INTERNAL MEDICINE | Facility: CLINIC | Age: 87
End: 2022-01-18

## 2022-01-18 VITALS
WEIGHT: 226 LBS | BODY MASS INDEX: 32.35 KG/M2 | HEIGHT: 70 IN | SYSTOLIC BLOOD PRESSURE: 130 MMHG | TEMPERATURE: 97.1 F | OXYGEN SATURATION: 97 % | HEART RATE: 84 BPM | DIASTOLIC BLOOD PRESSURE: 80 MMHG

## 2022-01-18 DIAGNOSIS — S01.01XA SCALP LACERATION, INITIAL ENCOUNTER: ICD-10-CM

## 2022-01-18 DIAGNOSIS — Z79.01 ANTICOAGULATED ON COUMADIN: ICD-10-CM

## 2022-01-18 DIAGNOSIS — Z79.01 WARFARIN ANTICOAGULATION: ICD-10-CM

## 2022-01-18 DIAGNOSIS — G96.08 TRAUMATIC SUBDURAL HYGROMA: ICD-10-CM
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
## 2022-01-18 DIAGNOSIS — I10 ESSENTIAL HYPERTENSION: ICD-10-CM
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
## 2022-01-18 DIAGNOSIS — E11.29 CONTROLLED TYPE 2 DIABETES MELLITUS WITH MICROALBUMINURIA, WITHOUT LONG-TERM CURRENT USE OF INSULIN: ICD-10-CM

## 2022-01-18 DIAGNOSIS — I48.91 ATRIAL FIBRILLATION, UNSPECIFIED TYPE: ICD-10-CM

## 2022-01-18 DIAGNOSIS — R80.9 CONTROLLED TYPE 2 DIABETES MELLITUS WITH MICROALBUMINURIA, WITHOUT LONG-TERM CURRENT USE OF INSULIN: ICD-10-CM

## 2022-01-18 PROCEDURE — 99214 OFFICE O/P EST MOD 30 MIN: CPT | Performed by: INTERNAL MEDICINE

## 2022-01-18 PROCEDURE — 70450 CT HEAD/BRAIN W/O DYE: CPT

## 2022-01-20 ENCOUNTER — HOSPITAL ENCOUNTER (OUTPATIENT)
Dept: CT IMAGING | Facility: HOSPITAL | Age: 87
Discharge: HOME | End: 2022-01-20
Admitting: INTERNAL MEDICINE

## 2022-01-20 DIAGNOSIS — S01.01XA SCALP LACERATION, INITIAL ENCOUNTER: ICD-10-CM
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
## 2022-01-20 DIAGNOSIS — G96.08 TRAUMATIC SUBDURAL HYGROMA: ICD-10-CM

## 2022-01-20 PROCEDURE — 70450 CT HEAD/BRAIN W/O DYE: CPT

## 2022-01-21 DIAGNOSIS — G96.08 SUBDURAL HYGROMA: Primary | ICD-10-CM

## 2022-02-01 RX ORDER — ATORVASTATIN CALCIUM 20 MG/1
TABLET, FILM COATED ORAL
Qty: 90 TABLET | Refills: 2 | Status: SHIPPED | OUTPATIENT
Start: 2022-02-01

## 2022-03-11 ENCOUNTER — TELEPHONE (OUTPATIENT)
Dept: PHARMACY | Facility: HOSPITAL | Age: 87
End: 2022-03-11

## 2022-03-15 ENCOUNTER — ANTICOAGULATION VISIT (OUTPATIENT)
Dept: PHARMACY | Facility: HOSPITAL | Age: 87
End: 2022-03-15

## 2022-03-15 LAB
INR PPP: 2.8 (ref 0.91–1.09)
PROTHROMBIN TIME: 34 SECONDS (ref 10–13.8)

## 2022-03-15 PROCEDURE — 36416 COLLJ CAPILLARY BLOOD SPEC: CPT

## 2022-03-15 PROCEDURE — 85610 PROTHROMBIN TIME: CPT

## 2022-03-15 PROCEDURE — G0463 HOSPITAL OUTPT CLINIC VISIT: HCPCS

## 2022-03-15 RX ORDER — WARFARIN SODIUM 5 MG/1
5 TABLET ORAL DAILY
Qty: 90 TABLET | Refills: 0 | Status: SHIPPED | OUTPATIENT
Start: 2022-03-15 | End: 2022-05-19 | Stop reason: SDUPTHER

## 2022-03-15 RX ORDER — WARFARIN SODIUM 5 MG/1
TABLET ORAL
Qty: 85 TABLET | Refills: 0 | OUTPATIENT
Start: 2022-03-15

## 2022-03-29 ENCOUNTER — ANTICOAGULATION VISIT (OUTPATIENT)
Dept: PHARMACY | Facility: HOSPITAL | Age: 87
End: 2022-03-29

## 2022-03-29 LAB
INR PPP: 2.1 (ref 0.91–1.09)
PROTHROMBIN TIME: 25.3 SECONDS (ref 10–13.8)

## 2022-03-29 PROCEDURE — 85610 PROTHROMBIN TIME: CPT

## 2022-03-29 PROCEDURE — 36416 COLLJ CAPILLARY BLOOD SPEC: CPT

## 2022-03-29 RX ORDER — FOLIC ACID 1 MG/1
TABLET ORAL
Qty: 90 TABLET | Refills: 2 | Status: SHIPPED | OUTPATIENT
Start: 2022-03-29 | End: 2023-01-06 | Stop reason: SDUPTHER

## 2022-04-20 ENCOUNTER — TELEPHONE (OUTPATIENT)
Dept: PHARMACY | Facility: HOSPITAL | Age: 87
End: 2022-04-20

## 2022-04-26 ENCOUNTER — TELEPHONE (OUTPATIENT)
Dept: PHARMACY | Facility: HOSPITAL | Age: 87
End: 2022-04-26

## 2022-05-03 ENCOUNTER — TELEPHONE (OUTPATIENT)
Dept: PHARMACY | Facility: HOSPITAL | Age: 87
End: 2022-05-03

## 2022-05-04 ENCOUNTER — ANTICOAGULATION VISIT (OUTPATIENT)
Dept: PHARMACY | Facility: HOSPITAL | Age: 87
End: 2022-05-04

## 2022-05-04 LAB
INR PPP: 2.4 (ref 0.91–1.09)
PROTHROMBIN TIME: 28.8 SECONDS (ref 10–13.8)

## 2022-05-04 PROCEDURE — 85610 PROTHROMBIN TIME: CPT

## 2022-05-04 PROCEDURE — 36416 COLLJ CAPILLARY BLOOD SPEC: CPT

## 2022-05-19 RX ORDER — WARFARIN SODIUM 5 MG/1
TABLET ORAL
Qty: 80 TABLET | Refills: 0 | Status: SHIPPED | OUTPATIENT
Start: 2022-05-19 | End: 2022-08-04

## 2022-05-25 ENCOUNTER — ANTICOAGULATION VISIT (OUTPATIENT)
Dept: PHARMACY | Facility: HOSPITAL | Age: 87
End: 2022-05-25

## 2022-05-25 LAB
INR PPP: 3 (ref 0.91–1.09)
PROTHROMBIN TIME: 35.8 SECONDS (ref 10–13.8)

## 2022-05-25 PROCEDURE — G0463 HOSPITAL OUTPT CLINIC VISIT: HCPCS

## 2022-05-25 PROCEDURE — 36416 COLLJ CAPILLARY BLOOD SPEC: CPT

## 2022-05-25 PROCEDURE — 85610 PROTHROMBIN TIME: CPT

## 2022-05-31 RX ORDER — FUROSEMIDE 20 MG/1
TABLET ORAL
Qty: 45 TABLET | Refills: 2 | Status: SHIPPED | OUTPATIENT
Start: 2022-05-31

## 2022-06-08 ENCOUNTER — APPOINTMENT (OUTPATIENT)
Dept: PHARMACY | Facility: HOSPITAL | Age: 87
End: 2022-06-08

## 2022-06-16 ENCOUNTER — TELEPHONE (OUTPATIENT)
Dept: PHARMACY | Facility: HOSPITAL | Age: 87
End: 2022-06-16

## 2022-06-23 ENCOUNTER — ANTICOAGULATION VISIT (OUTPATIENT)
Dept: PHARMACY | Facility: HOSPITAL | Age: 87
End: 2022-06-23

## 2022-06-23 LAB
INR PPP: 3.1 (ref 0.91–1.09)
PROTHROMBIN TIME: 37.4 SECONDS (ref 10–13.8)

## 2022-06-23 PROCEDURE — 36416 COLLJ CAPILLARY BLOOD SPEC: CPT

## 2022-06-23 PROCEDURE — 85610 PROTHROMBIN TIME: CPT

## 2022-06-23 PROCEDURE — G0463 HOSPITAL OUTPT CLINIC VISIT: HCPCS

## 2022-06-29 ENCOUNTER — CLINICAL SUPPORT NO REQUIREMENTS (OUTPATIENT)
Dept: CARDIOLOGY | Facility: CLINIC | Age: 87
End: 2022-06-29

## 2022-06-29 ENCOUNTER — OFFICE VISIT (OUTPATIENT)
Dept: CARDIOLOGY | Facility: CLINIC | Age: 87
End: 2022-06-29

## 2022-06-29 VITALS
HEIGHT: 70 IN | OXYGEN SATURATION: 97 % | DIASTOLIC BLOOD PRESSURE: 76 MMHG | WEIGHT: 220 LBS | HEART RATE: 67 BPM | BODY MASS INDEX: 31.5 KG/M2 | SYSTOLIC BLOOD PRESSURE: 134 MMHG

## 2022-06-29 DIAGNOSIS — I48.91 ATRIAL FIBRILLATION, UNSPECIFIED TYPE: ICD-10-CM

## 2022-06-29 DIAGNOSIS — I49.5 SICK SINUS SYNDROME: Primary | ICD-10-CM

## 2022-06-29 DIAGNOSIS — I35.1 NONRHEUMATIC AORTIC VALVE INSUFFICIENCY: ICD-10-CM

## 2022-06-29 DIAGNOSIS — I10 ESSENTIAL HYPERTENSION: ICD-10-CM

## 2022-06-29 DIAGNOSIS — I48.11 LONGSTANDING PERSISTENT ATRIAL FIBRILLATION: Primary | ICD-10-CM

## 2022-06-29 DIAGNOSIS — I47.29 NSVT (NONSUSTAINED VENTRICULAR TACHYCARDIA): ICD-10-CM

## 2022-06-29 PROCEDURE — 93000 ELECTROCARDIOGRAM COMPLETE: CPT | Performed by: NURSE PRACTITIONER

## 2022-06-29 PROCEDURE — 93289 INTERROG DEVICE EVAL HEART: CPT | Performed by: INTERNAL MEDICINE

## 2022-06-29 PROCEDURE — 99214 OFFICE O/P EST MOD 30 MIN: CPT | Performed by: NURSE PRACTITIONER

## 2022-06-29 PROCEDURE — 93290 INTERROG DEV EVAL ICPMS IP: CPT | Performed by: INTERNAL MEDICINE

## 2022-07-13 RX ORDER — CARVEDILOL 25 MG/1
TABLET ORAL
Qty: 180 TABLET | Refills: 1 | Status: SHIPPED | OUTPATIENT
Start: 2022-07-13 | End: 2022-12-07 | Stop reason: SDUPTHER

## 2022-07-28 ENCOUNTER — ANTICOAGULATION VISIT (OUTPATIENT)
Dept: PHARMACY | Facility: HOSPITAL | Age: 87
End: 2022-07-28

## 2022-07-28 LAB
INR PPP: 2.9 (ref 0.91–1.09)
PROTHROMBIN TIME: 34.5 SECONDS (ref 10–13.8)

## 2022-07-28 PROCEDURE — G0463 HOSPITAL OUTPT CLINIC VISIT: HCPCS

## 2022-07-28 PROCEDURE — 85610 PROTHROMBIN TIME: CPT

## 2022-07-28 PROCEDURE — 36416 COLLJ CAPILLARY BLOOD SPEC: CPT

## 2022-08-04 RX ORDER — WARFARIN SODIUM 5 MG/1
TABLET ORAL
Qty: 80 TABLET | Refills: 0 | Status: SHIPPED | OUTPATIENT
Start: 2022-08-04 | End: 2022-12-01

## 2022-08-11 ENCOUNTER — ANTICOAGULATION VISIT (OUTPATIENT)
Dept: PHARMACY | Facility: HOSPITAL | Age: 87
End: 2022-08-11

## 2022-08-11 LAB
INR PPP: 2.1 (ref 0.91–1.09)
PROTHROMBIN TIME: 24.8 SECONDS (ref 10–13.8)

## 2022-08-11 PROCEDURE — 85610 PROTHROMBIN TIME: CPT

## 2022-08-11 PROCEDURE — 36416 COLLJ CAPILLARY BLOOD SPEC: CPT

## 2022-08-25 ENCOUNTER — ANTICOAGULATION VISIT (OUTPATIENT)
Dept: PHARMACY | Facility: HOSPITAL | Age: 87
End: 2022-08-25

## 2022-08-25 LAB
INR PPP: 2.3 (ref 0.91–1.09)
PROTHROMBIN TIME: 27.9 SECONDS (ref 10–13.8)

## 2022-08-25 PROCEDURE — 85610 PROTHROMBIN TIME: CPT

## 2022-08-25 PROCEDURE — 36416 COLLJ CAPILLARY BLOOD SPEC: CPT

## 2022-09-21 ENCOUNTER — ANTICOAGULATION VISIT (OUTPATIENT)
Dept: PHARMACY | Facility: HOSPITAL | Age: 87
End: 2022-09-21

## 2022-09-21 LAB
INR PPP: 1.7 (ref 0.91–1.09)
PROTHROMBIN TIME: 20.2 SECONDS (ref 10–13.8)

## 2022-09-21 PROCEDURE — 85610 PROTHROMBIN TIME: CPT

## 2022-09-21 PROCEDURE — 36416 COLLJ CAPILLARY BLOOD SPEC: CPT

## 2022-10-12 ENCOUNTER — TELEPHONE (OUTPATIENT)
Dept: PHARMACY | Facility: HOSPITAL | Age: 87
End: 2022-10-12

## 2022-11-07 ENCOUNTER — TELEPHONE (OUTPATIENT)
Dept: PHARMACY | Facility: HOSPITAL | Age: 87
End: 2022-11-07

## 2022-11-15 ENCOUNTER — TELEPHONE (OUTPATIENT)
Dept: PHARMACY | Facility: HOSPITAL | Age: 87
End: 2022-11-15

## 2022-12-01 RX ORDER — WARFARIN SODIUM 5 MG/1
TABLET ORAL
Qty: 20 TABLET | Refills: 0 | Status: SHIPPED | OUTPATIENT
Start: 2022-12-01 | End: 2022-12-29

## 2022-12-02 ENCOUNTER — TELEPHONE (OUTPATIENT)
Dept: PHARMACY | Facility: HOSPITAL | Age: 87
End: 2022-12-02

## 2022-12-06 ENCOUNTER — ANTICOAGULATION VISIT (OUTPATIENT)
Dept: PHARMACY | Facility: HOSPITAL | Age: 87
End: 2022-12-06

## 2022-12-06 DIAGNOSIS — I48.91 ATRIAL FIBRILLATION, UNSPECIFIED TYPE: Primary | ICD-10-CM

## 2022-12-06 LAB
INR PPP: 2.9 (ref 0.91–1.09)
PROTHROMBIN TIME: 34.5 SECONDS (ref 10–13.8)

## 2022-12-06 PROCEDURE — 36416 COLLJ CAPILLARY BLOOD SPEC: CPT

## 2022-12-06 PROCEDURE — 85610 PROTHROMBIN TIME: CPT

## 2022-12-06 PROCEDURE — G0463 HOSPITAL OUTPT CLINIC VISIT: HCPCS

## 2022-12-07 RX ORDER — CARVEDILOL 25 MG/1
25 TABLET ORAL 2 TIMES DAILY WITH MEALS
Qty: 180 TABLET | Refills: 1 | Status: SHIPPED | OUTPATIENT
Start: 2022-12-07

## 2022-12-27 ENCOUNTER — TELEPHONE (OUTPATIENT)
Dept: PHARMACY | Facility: HOSPITAL | Age: 87
End: 2022-12-27

## 2022-12-29 RX ORDER — WARFARIN SODIUM 5 MG/1
TABLET ORAL
Qty: 60 TABLET | Refills: 1 | Status: SHIPPED | OUTPATIENT
Start: 2022-12-29 | End: 2023-01-06 | Stop reason: SDUPTHER

## 2023-01-06 ENCOUNTER — ANTICOAGULATION VISIT (OUTPATIENT)
Dept: PHARMACY | Facility: HOSPITAL | Age: 88
End: 2023-01-06
Payer: MEDICARE

## 2023-01-06 ENCOUNTER — OFFICE VISIT (OUTPATIENT)
Dept: CARDIOLOGY | Facility: CLINIC | Age: 88
End: 2023-01-06
Payer: MEDICARE

## 2023-01-06 VITALS
SYSTOLIC BLOOD PRESSURE: 140 MMHG | HEIGHT: 70 IN | DIASTOLIC BLOOD PRESSURE: 80 MMHG | WEIGHT: 220 LBS | BODY MASS INDEX: 31.5 KG/M2 | HEART RATE: 69 BPM | OXYGEN SATURATION: 93 %

## 2023-01-06 DIAGNOSIS — I10 ESSENTIAL HYPERTENSION: ICD-10-CM

## 2023-01-06 DIAGNOSIS — I48.91 ATRIAL FIBRILLATION, UNSPECIFIED TYPE: Primary | ICD-10-CM

## 2023-01-06 DIAGNOSIS — G47.33 OBSTRUCTIVE SLEEP APNEA SYNDROME: ICD-10-CM

## 2023-01-06 DIAGNOSIS — I42.1 HYPERTROPHIC OBSTRUCTIVE CARDIOMYOPATHY: ICD-10-CM

## 2023-01-06 DIAGNOSIS — I35.1 NONRHEUMATIC AORTIC VALVE INSUFFICIENCY: ICD-10-CM

## 2023-01-06 DIAGNOSIS — Z86.73 HISTORY OF STROKE: ICD-10-CM

## 2023-01-06 LAB
INR PPP: 2.1 (ref 0.91–1.09)
PROTHROMBIN TIME: 25.7 SECONDS (ref 10–13.8)

## 2023-01-06 PROCEDURE — 36416 COLLJ CAPILLARY BLOOD SPEC: CPT

## 2023-01-06 PROCEDURE — 93000 ELECTROCARDIOGRAM COMPLETE: CPT | Performed by: NURSE PRACTITIONER

## 2023-01-06 PROCEDURE — 85610 PROTHROMBIN TIME: CPT

## 2023-01-06 PROCEDURE — 99214 OFFICE O/P EST MOD 30 MIN: CPT | Performed by: NURSE PRACTITIONER

## 2023-01-06 RX ORDER — WARFARIN SODIUM 5 MG/1
TABLET ORAL
Qty: 60 TABLET | Refills: 1 | Status: SHIPPED | OUTPATIENT
Start: 2023-01-06

## 2023-01-06 RX ORDER — FOLIC ACID 1 MG/1
1000 TABLET ORAL DAILY
Qty: 90 TABLET | Refills: 2 | Status: SHIPPED | OUTPATIENT
Start: 2023-01-06

## 2023-01-06 RX ORDER — FOLIC ACID 1 MG/1
1000 TABLET ORAL DAILY
Qty: 90 TABLET | Refills: 2 | Status: SHIPPED | OUTPATIENT
Start: 2023-01-06 | End: 2023-01-06

## 2023-01-06 RX ORDER — WARFARIN SODIUM 5 MG/1
TABLET ORAL
Qty: 60 TABLET | Refills: 1 | Status: SHIPPED | OUTPATIENT
Start: 2023-01-06 | End: 2023-01-06

## 2023-02-16 ENCOUNTER — TELEPHONE (OUTPATIENT)
Dept: PHARMACY | Facility: HOSPITAL | Age: 88
End: 2023-02-16
Payer: MEDICARE

## 2023-02-24 ENCOUNTER — ANTICOAGULATION VISIT (OUTPATIENT)
Dept: PHARMACY | Facility: HOSPITAL | Age: 88
End: 2023-02-24
Payer: MEDICARE

## 2023-02-24 LAB
INR PPP: 1.2 (ref 0.91–1.09)
PROTHROMBIN TIME: 14.6 SECONDS (ref 10–13.8)

## 2023-02-24 PROCEDURE — 36416 COLLJ CAPILLARY BLOOD SPEC: CPT

## 2023-02-24 PROCEDURE — 85610 PROTHROMBIN TIME: CPT

## 2023-02-24 PROCEDURE — G0463 HOSPITAL OUTPT CLINIC VISIT: HCPCS

## 2023-03-22 ENCOUNTER — ANTICOAGULATION VISIT (OUTPATIENT)
Dept: PHARMACY | Facility: HOSPITAL | Age: 88
End: 2023-03-22
Payer: MEDICARE

## 2023-03-22 LAB
INR PPP: 1.2 (ref 0.91–1.09)
PROTHROMBIN TIME: 14.1 SECONDS (ref 10–13.8)

## 2023-03-22 PROCEDURE — G0463 HOSPITAL OUTPT CLINIC VISIT: HCPCS

## 2023-03-22 PROCEDURE — 36416 COLLJ CAPILLARY BLOOD SPEC: CPT

## 2023-03-22 PROCEDURE — 85610 PROTHROMBIN TIME: CPT

## 2023-04-14 ENCOUNTER — ANTICOAGULATION VISIT (OUTPATIENT)
Dept: PHARMACY | Facility: HOSPITAL | Age: 88
End: 2023-04-14
Payer: MEDICARE

## 2023-04-14 DIAGNOSIS — I48.91 ATRIAL FIBRILLATION, UNSPECIFIED TYPE: Primary | ICD-10-CM

## 2023-04-14 LAB
INR PPP: 1.4 (ref 0.91–1.09)
PROTHROMBIN TIME: 16.2 SECONDS (ref 10–13.8)

## 2023-04-14 PROCEDURE — 36416 COLLJ CAPILLARY BLOOD SPEC: CPT

## 2023-04-14 PROCEDURE — G0463 HOSPITAL OUTPT CLINIC VISIT: HCPCS

## 2023-04-14 PROCEDURE — 85610 PROTHROMBIN TIME: CPT

## 2023-05-02 ENCOUNTER — ANTICOAGULATION VISIT (OUTPATIENT)
Dept: PHARMACY | Facility: HOSPITAL | Age: 88
End: 2023-05-02
Payer: MEDICARE

## 2023-05-02 LAB
INR PPP: 1.4 (ref 0.91–1.09)
PROTHROMBIN TIME: 16.2 SECONDS (ref 10–13.8)

## 2023-05-02 PROCEDURE — G0463 HOSPITAL OUTPT CLINIC VISIT: HCPCS

## 2023-05-02 PROCEDURE — 85610 PROTHROMBIN TIME: CPT

## 2023-05-02 PROCEDURE — 36416 COLLJ CAPILLARY BLOOD SPEC: CPT

## 2023-05-02 RX ORDER — WARFARIN SODIUM 5 MG/1
TABLET ORAL
Qty: 75 TABLET | Refills: 1 | Status: SHIPPED | OUTPATIENT
Start: 2023-05-02

## 2023-05-15 ENCOUNTER — ANTICOAGULATION VISIT (OUTPATIENT)
Dept: PHARMACY | Facility: HOSPITAL | Age: 88
End: 2023-05-15
Payer: MEDICARE

## 2023-05-15 LAB
INR PPP: 1.4 (ref 0.91–1.09)
PROTHROMBIN TIME: 16.6 SECONDS (ref 10–13.8)

## 2023-05-15 PROCEDURE — G0463 HOSPITAL OUTPT CLINIC VISIT: HCPCS

## 2023-05-15 PROCEDURE — 36416 COLLJ CAPILLARY BLOOD SPEC: CPT

## 2023-05-15 PROCEDURE — 85610 PROTHROMBIN TIME: CPT

## 2023-05-25 RX ORDER — FUROSEMIDE 20 MG/1
TABLET ORAL
Qty: 45 TABLET | Refills: 2 | Status: SHIPPED | OUTPATIENT
Start: 2023-05-25

## 2023-06-02 ENCOUNTER — ANTICOAGULATION VISIT (OUTPATIENT)
Dept: PHARMACY | Facility: HOSPITAL | Age: 88
End: 2023-06-02

## 2023-06-02 DIAGNOSIS — I48.91 ATRIAL FIBRILLATION, UNSPECIFIED TYPE: Primary | ICD-10-CM

## 2023-06-02 LAB
INR PPP: 1.2 (ref 0.91–1.09)
PROTHROMBIN TIME: 14.2 SECONDS (ref 10–13.8)

## 2023-06-02 PROCEDURE — G0463 HOSPITAL OUTPT CLINIC VISIT: HCPCS

## 2023-06-02 PROCEDURE — 85610 PROTHROMBIN TIME: CPT

## 2023-06-02 PROCEDURE — 36416 COLLJ CAPILLARY BLOOD SPEC: CPT

## 2023-06-02 RX ORDER — WARFARIN SODIUM 5 MG/1
TABLET ORAL
Qty: 75 TABLET | Refills: 1 | Status: SHIPPED | OUTPATIENT
Start: 2023-06-02

## 2023-06-05 RX ORDER — CARVEDILOL 25 MG/1
25 TABLET ORAL 2 TIMES DAILY WITH MEALS
Qty: 180 TABLET | Refills: 1 | Status: SHIPPED | OUTPATIENT
Start: 2023-06-05

## 2023-06-05 RX ORDER — ATORVASTATIN CALCIUM 20 MG/1
20 TABLET, FILM COATED ORAL DAILY
Qty: 90 TABLET | Refills: 2 | Status: SHIPPED | OUTPATIENT
Start: 2023-06-05

## 2023-06-19 ENCOUNTER — ANTICOAGULATION VISIT (OUTPATIENT)
Dept: PHARMACY | Facility: HOSPITAL | Age: 88
End: 2023-06-19
Payer: MEDICARE

## 2023-06-19 DIAGNOSIS — I48.91 ATRIAL FIBRILLATION, UNSPECIFIED TYPE: Primary | ICD-10-CM

## 2023-06-19 LAB
INR PPP: 1.5 (ref 0.91–1.09)
PROTHROMBIN TIME: 18.3 SECONDS (ref 10–13.8)

## 2023-06-19 PROCEDURE — 85610 PROTHROMBIN TIME: CPT

## 2023-06-19 PROCEDURE — G0463 HOSPITAL OUTPT CLINIC VISIT: HCPCS

## 2023-06-19 PROCEDURE — 36416 COLLJ CAPILLARY BLOOD SPEC: CPT

## 2023-08-07 ENCOUNTER — OFFICE VISIT (OUTPATIENT)
Dept: CARDIOLOGY | Facility: CLINIC | Age: 88
End: 2023-08-07
Payer: MEDICARE

## 2023-08-07 ENCOUNTER — ANTICOAGULATION VISIT (OUTPATIENT)
Dept: PHARMACY | Facility: HOSPITAL | Age: 88
End: 2023-08-07
Payer: MEDICARE

## 2023-08-07 ENCOUNTER — CLINICAL SUPPORT NO REQUIREMENTS (OUTPATIENT)
Dept: CARDIOLOGY | Facility: CLINIC | Age: 88
End: 2023-08-07
Payer: MEDICARE

## 2023-08-07 VITALS
BODY MASS INDEX: 31.07 KG/M2 | WEIGHT: 217 LBS | SYSTOLIC BLOOD PRESSURE: 128 MMHG | HEIGHT: 70 IN | DIASTOLIC BLOOD PRESSURE: 68 MMHG

## 2023-08-07 DIAGNOSIS — I35.1 NONRHEUMATIC AORTIC VALVE INSUFFICIENCY: ICD-10-CM

## 2023-08-07 DIAGNOSIS — I47.29 NSVT (NONSUSTAINED VENTRICULAR TACHYCARDIA): ICD-10-CM

## 2023-08-07 DIAGNOSIS — I42.1 HYPERTROPHIC OBSTRUCTIVE CARDIOMYOPATHY: Primary | ICD-10-CM

## 2023-08-07 DIAGNOSIS — I48.11 LONGSTANDING PERSISTENT ATRIAL FIBRILLATION: ICD-10-CM

## 2023-08-07 DIAGNOSIS — G47.33 OBSTRUCTIVE SLEEP APNEA SYNDROME: ICD-10-CM

## 2023-08-07 DIAGNOSIS — I49.5 SICK SINUS SYNDROME: Primary | ICD-10-CM

## 2023-08-07 DIAGNOSIS — Z86.73 HISTORY OF STROKE: ICD-10-CM

## 2023-08-07 LAB
INR PPP: 1.5 (ref 0.91–1.09)
PROTHROMBIN TIME: 17.8 SECONDS (ref 10–13.8)

## 2023-08-07 PROCEDURE — 1160F RVW MEDS BY RX/DR IN RCRD: CPT | Performed by: NURSE PRACTITIONER

## 2023-08-07 PROCEDURE — G0463 HOSPITAL OUTPT CLINIC VISIT: HCPCS

## 2023-08-07 PROCEDURE — 93282 PRGRMG EVAL IMPLANTABLE DFB: CPT | Performed by: INTERNAL MEDICINE

## 2023-08-07 PROCEDURE — 99214 OFFICE O/P EST MOD 30 MIN: CPT | Performed by: NURSE PRACTITIONER

## 2023-08-07 PROCEDURE — 85610 PROTHROMBIN TIME: CPT

## 2023-08-07 PROCEDURE — 93290 INTERROG DEV EVAL ICPMS IP: CPT | Performed by: INTERNAL MEDICINE

## 2023-08-07 PROCEDURE — 93000 ELECTROCARDIOGRAM COMPLETE: CPT | Performed by: NURSE PRACTITIONER

## 2023-08-07 PROCEDURE — 36416 COLLJ CAPILLARY BLOOD SPEC: CPT

## 2023-08-07 PROCEDURE — 1159F MED LIST DOCD IN RCRD: CPT | Performed by: NURSE PRACTITIONER

## 2023-09-28 ENCOUNTER — ANTICOAGULATION VISIT (OUTPATIENT)
Dept: PHARMACY | Facility: HOSPITAL | Age: 88
End: 2023-09-28
Payer: MEDICARE

## 2023-09-28 DIAGNOSIS — I48.91 ATRIAL FIBRILLATION, UNSPECIFIED TYPE: Primary | ICD-10-CM

## 2023-09-28 LAB
INR PPP: 1.8 (ref 0.91–1.09)
PROTHROMBIN TIME: 21.5 SECONDS (ref 10–13.8)

## 2023-09-28 PROCEDURE — 36416 COLLJ CAPILLARY BLOOD SPEC: CPT

## 2023-09-28 PROCEDURE — 85610 PROTHROMBIN TIME: CPT

## 2023-10-04 DIAGNOSIS — R80.9 MICROALBUMINURIA: ICD-10-CM

## 2023-10-04 DIAGNOSIS — R80.9 CONTROLLED TYPE 2 DIABETES MELLITUS WITH MICROALBUMINURIA, WITHOUT LONG-TERM CURRENT USE OF INSULIN: ICD-10-CM

## 2023-10-04 DIAGNOSIS — E79.0 ELEVATED BLOOD URIC ACID LEVEL: ICD-10-CM

## 2023-10-04 DIAGNOSIS — I10 ESSENTIAL HYPERTENSION: Primary | ICD-10-CM

## 2023-10-04 DIAGNOSIS — E11.29 CONTROLLED TYPE 2 DIABETES MELLITUS WITH MICROALBUMINURIA, WITHOUT LONG-TERM CURRENT USE OF INSULIN: ICD-10-CM

## 2023-10-04 DIAGNOSIS — R82.90 NONSPECIFIC FINDING ON EXAMINATION OF URINE: ICD-10-CM

## 2023-10-13 ENCOUNTER — ANTICOAGULATION VISIT (OUTPATIENT)
Dept: PHARMACY | Facility: HOSPITAL | Age: 88
End: 2023-10-13
Payer: MEDICARE

## 2023-10-13 LAB
INR PPP: 1.8 (ref 0.91–1.09)
PROTHROMBIN TIME: 21.7 SECONDS (ref 10–13.8)

## 2023-10-13 PROCEDURE — 36416 COLLJ CAPILLARY BLOOD SPEC: CPT

## 2023-10-13 PROCEDURE — 85610 PROTHROMBIN TIME: CPT

## 2023-11-10 ENCOUNTER — ANTICOAGULATION VISIT (OUTPATIENT)
Dept: PHARMACY | Facility: HOSPITAL | Age: 88
End: 2023-11-10
Payer: MEDICARE

## 2023-11-10 DIAGNOSIS — I48.91 ATRIAL FIBRILLATION, UNSPECIFIED TYPE: Primary | ICD-10-CM

## 2023-11-10 LAB
INR PPP: 1.7 (ref 0.91–1.09)
PROTHROMBIN TIME: 20.2 SECONDS (ref 10–13.8)

## 2023-11-10 PROCEDURE — 85610 PROTHROMBIN TIME: CPT

## 2023-11-10 PROCEDURE — 36416 COLLJ CAPILLARY BLOOD SPEC: CPT

## 2023-11-17 RX ORDER — WARFARIN SODIUM 5 MG/1
TABLET ORAL
Qty: 85 TABLET | Refills: 1 | Status: SHIPPED | OUTPATIENT
Start: 2023-11-17

## 2023-12-04 RX ORDER — FOLIC ACID 1 MG/1
1000 TABLET ORAL DAILY
Qty: 90 TABLET | Refills: 2 | Status: SHIPPED | OUTPATIENT
Start: 2023-12-04

## 2023-12-11 ENCOUNTER — ANTICOAGULATION VISIT (OUTPATIENT)
Dept: PHARMACY | Facility: HOSPITAL | Age: 88
End: 2023-12-11
Payer: MEDICARE

## 2023-12-11 LAB
INR PPP: 1.6 (ref 0.91–1.09)
PROTHROMBIN TIME: 19.2 SECONDS (ref 10–13.8)

## 2023-12-11 PROCEDURE — 36416 COLLJ CAPILLARY BLOOD SPEC: CPT

## 2023-12-11 PROCEDURE — G0463 HOSPITAL OUTPT CLINIC VISIT: HCPCS

## 2023-12-11 PROCEDURE — 85610 PROTHROMBIN TIME: CPT

## 2023-12-29 ENCOUNTER — ANTICOAGULATION VISIT (OUTPATIENT)
Dept: PHARMACY | Facility: HOSPITAL | Age: 88
End: 2023-12-29
Payer: MEDICARE

## 2023-12-29 LAB
INR PPP: 2 (ref 0.91–1.09)
PROTHROMBIN TIME: 24.6 SECONDS (ref 10–13.8)

## 2023-12-29 PROCEDURE — 85610 PROTHROMBIN TIME: CPT

## 2023-12-29 PROCEDURE — 36416 COLLJ CAPILLARY BLOOD SPEC: CPT

## 2024-01-26 ENCOUNTER — ANTICOAGULATION VISIT (OUTPATIENT)
Dept: PHARMACY | Facility: HOSPITAL | Age: 89
End: 2024-01-26
Payer: MEDICARE

## 2024-01-26 LAB
INR PPP: 2 (ref 0.91–1.09)
PROTHROMBIN TIME: 23.7 SECONDS (ref 10–13.8)

## 2024-01-26 PROCEDURE — G0463 HOSPITAL OUTPT CLINIC VISIT: HCPCS

## 2024-01-26 PROCEDURE — 85610 PROTHROMBIN TIME: CPT

## 2024-01-26 PROCEDURE — 36416 COLLJ CAPILLARY BLOOD SPEC: CPT

## 2024-03-04 RX ORDER — CARVEDILOL 25 MG/1
25 TABLET ORAL 2 TIMES DAILY WITH MEALS
Qty: 180 TABLET | Refills: 1 | OUTPATIENT
Start: 2024-03-04

## 2024-03-04 RX ORDER — ATORVASTATIN CALCIUM 20 MG/1
20 TABLET, FILM COATED ORAL DAILY
Qty: 90 TABLET | Refills: 2 | OUTPATIENT
Start: 2024-03-04

## 2024-03-08 ENCOUNTER — ANTICOAGULATION VISIT (OUTPATIENT)
Dept: PHARMACY | Facility: HOSPITAL | Age: 89
End: 2024-03-08
Payer: MEDICARE

## 2024-03-08 LAB
INR PPP: 1.6 (ref 0.91–1.09)
PROTHROMBIN TIME: 19.1 SECONDS (ref 10–13.8)

## 2024-03-08 PROCEDURE — 36416 COLLJ CAPILLARY BLOOD SPEC: CPT

## 2024-03-08 PROCEDURE — 85610 PROTHROMBIN TIME: CPT

## 2024-03-08 PROCEDURE — G0463 HOSPITAL OUTPT CLINIC VISIT: HCPCS

## 2024-03-14 RX ORDER — FUROSEMIDE 20 MG/1
TABLET ORAL
Qty: 45 TABLET | Refills: 0 | Status: SHIPPED | OUTPATIENT
Start: 2024-03-14

## 2024-03-15 RX ORDER — CARVEDILOL 25 MG/1
25 TABLET ORAL 2 TIMES DAILY WITH MEALS
Qty: 180 TABLET | Refills: 0 | Status: SHIPPED | OUTPATIENT
Start: 2024-03-15

## 2024-03-15 RX ORDER — ATORVASTATIN CALCIUM 20 MG/1
20 TABLET, FILM COATED ORAL DAILY
Qty: 90 TABLET | Refills: 0 | Status: SHIPPED | OUTPATIENT
Start: 2024-03-15

## 2024-03-15 RX ORDER — FOLIC ACID 1 MG/1
1000 TABLET ORAL DAILY
Qty: 90 TABLET | Refills: 0 | Status: SHIPPED | OUTPATIENT
Start: 2024-03-15

## 2024-03-22 ENCOUNTER — ANTICOAGULATION VISIT (OUTPATIENT)
Dept: PHARMACY | Facility: HOSPITAL | Age: 89
End: 2024-03-22
Payer: MEDICARE

## 2024-03-22 LAB
INR PPP: 1.5 (ref 0.91–1.09)
PROTHROMBIN TIME: 18.5 SECONDS (ref 10–13.8)

## 2024-03-22 PROCEDURE — G0463 HOSPITAL OUTPT CLINIC VISIT: HCPCS

## 2024-03-22 PROCEDURE — 36416 COLLJ CAPILLARY BLOOD SPEC: CPT

## 2024-03-22 PROCEDURE — 85610 PROTHROMBIN TIME: CPT

## 2024-04-05 ENCOUNTER — ANTICOAGULATION VISIT (OUTPATIENT)
Dept: PHARMACY | Facility: HOSPITAL | Age: 89
End: 2024-04-05
Payer: MEDICARE

## 2024-04-05 LAB
INR PPP: 1.9 (ref 0.91–1.09)
PROTHROMBIN TIME: 23.3 SECONDS (ref 10–13.8)

## 2024-04-05 PROCEDURE — 85610 PROTHROMBIN TIME: CPT

## 2024-04-05 PROCEDURE — G0463 HOSPITAL OUTPT CLINIC VISIT: HCPCS

## 2024-04-05 PROCEDURE — 36416 COLLJ CAPILLARY BLOOD SPEC: CPT

## 2024-04-11 RX ORDER — WARFARIN SODIUM 5 MG/1
TABLET ORAL
Qty: 90 TABLET | Refills: 0 | Status: SHIPPED | OUTPATIENT
Start: 2024-04-11

## 2024-04-19 ENCOUNTER — ANTICOAGULATION VISIT (OUTPATIENT)
Dept: PHARMACY | Facility: HOSPITAL | Age: 89
End: 2024-04-19
Payer: MEDICARE

## 2024-04-19 DIAGNOSIS — I48.91 ATRIAL FIBRILLATION, UNSPECIFIED TYPE: Primary | ICD-10-CM

## 2024-04-19 LAB
INR PPP: 2 (ref 0.91–1.09)
PROTHROMBIN TIME: 23.6 SECONDS (ref 10–13.8)

## 2024-04-19 PROCEDURE — G0463 HOSPITAL OUTPT CLINIC VISIT: HCPCS

## 2024-04-19 PROCEDURE — 36416 COLLJ CAPILLARY BLOOD SPEC: CPT

## 2024-04-19 PROCEDURE — 85610 PROTHROMBIN TIME: CPT

## 2024-05-15 DIAGNOSIS — E87.5 HYPERKALEMIA: Primary | ICD-10-CM

## 2024-05-17 ENCOUNTER — ANTICOAGULATION VISIT (OUTPATIENT)
Dept: PHARMACY | Facility: HOSPITAL | Age: 89
End: 2024-05-17
Payer: MEDICARE

## 2024-05-17 LAB
INR PPP: 2.2 (ref 0.91–1.09)
PROTHROMBIN TIME: 26.6 SECONDS (ref 10–13.8)

## 2024-05-17 PROCEDURE — G0463 HOSPITAL OUTPT CLINIC VISIT: HCPCS

## 2024-05-17 PROCEDURE — 85610 PROTHROMBIN TIME: CPT

## 2024-05-17 PROCEDURE — 36416 COLLJ CAPILLARY BLOOD SPEC: CPT

## 2024-05-28 RX ORDER — FUROSEMIDE 20 MG/1
TABLET ORAL
Qty: 45 TABLET | Refills: 3 | Status: SHIPPED | OUTPATIENT
Start: 2024-05-28

## 2024-06-10 RX ORDER — CARVEDILOL 25 MG/1
TABLET ORAL
Qty: 60 TABLET | Refills: 0 | Status: SHIPPED | OUTPATIENT
Start: 2024-06-10

## 2024-06-10 RX ORDER — ATORVASTATIN CALCIUM 20 MG/1
TABLET, FILM COATED ORAL
Qty: 30 TABLET | Refills: 0 | Status: SHIPPED | OUTPATIENT
Start: 2024-06-10

## 2024-06-14 ENCOUNTER — ANTICOAGULATION VISIT (OUTPATIENT)
Dept: PHARMACY | Facility: HOSPITAL | Age: 89
End: 2024-06-14
Payer: MEDICARE

## 2024-06-14 LAB
INR PPP: 2.2 (ref 0.91–1.09)
PROTHROMBIN TIME: 26.7 SECONDS (ref 10–13.8)

## 2024-06-14 PROCEDURE — G0463 HOSPITAL OUTPT CLINIC VISIT: HCPCS

## 2024-06-14 PROCEDURE — 85610 PROTHROMBIN TIME: CPT

## 2024-06-14 PROCEDURE — 36416 COLLJ CAPILLARY BLOOD SPEC: CPT

## 2024-06-24 RX ORDER — WARFARIN SODIUM 5 MG/1
TABLET ORAL
Qty: 90 TABLET | Refills: 0 | Status: SHIPPED | OUTPATIENT
Start: 2024-06-24

## 2024-07-12 ENCOUNTER — ANTICOAGULATION VISIT (OUTPATIENT)
Dept: PHARMACY | Facility: HOSPITAL | Age: 89
End: 2024-07-12
Payer: MEDICARE

## 2024-07-12 LAB
INR PPP: 2.9 (ref 0.91–1.09)
PROTHROMBIN TIME: 34.9 SECONDS (ref 10–13.8)

## 2024-07-12 PROCEDURE — 36416 COLLJ CAPILLARY BLOOD SPEC: CPT

## 2024-07-12 PROCEDURE — 85610 PROTHROMBIN TIME: CPT

## 2024-07-12 PROCEDURE — G0463 HOSPITAL OUTPT CLINIC VISIT: HCPCS

## 2024-07-23 RX ORDER — ATORVASTATIN CALCIUM 20 MG/1
TABLET, FILM COATED ORAL
Qty: 30 TABLET | Refills: 0 | OUTPATIENT
Start: 2024-07-23

## 2024-07-23 RX ORDER — CARVEDILOL 25 MG/1
TABLET ORAL
Qty: 60 TABLET | Refills: 0 | OUTPATIENT
Start: 2024-07-23

## 2024-07-24 ENCOUNTER — TELEPHONE (OUTPATIENT)
Dept: INTERNAL MEDICINE | Facility: CLINIC | Age: 89
End: 2024-07-24

## 2024-07-26 ENCOUNTER — ANTICOAGULATION VISIT (OUTPATIENT)
Dept: PHARMACY | Facility: HOSPITAL | Age: 89
End: 2024-07-26
Payer: MEDICARE

## 2024-07-26 LAB
INR PPP: 2.5 (ref 0.91–1.09)
PROTHROMBIN TIME: 30.2 SECONDS (ref 10–13.8)

## 2024-07-26 PROCEDURE — 36416 COLLJ CAPILLARY BLOOD SPEC: CPT

## 2024-07-26 PROCEDURE — 85610 PROTHROMBIN TIME: CPT

## 2024-07-26 PROCEDURE — G0463 HOSPITAL OUTPT CLINIC VISIT: HCPCS

## 2024-08-14 PROCEDURE — 93297 REM INTERROG DEV EVAL ICPMS: CPT | Performed by: INTERNAL MEDICINE

## 2024-08-16 ENCOUNTER — ANTICOAGULATION VISIT (OUTPATIENT)
Dept: PHARMACY | Facility: HOSPITAL | Age: 89
End: 2024-08-16
Payer: MEDICARE

## 2024-08-16 LAB
INR PPP: 2 (ref 0.91–1.09)
PROTHROMBIN TIME: 24.4 SECONDS (ref 10–13.8)

## 2024-08-16 PROCEDURE — 36416 COLLJ CAPILLARY BLOOD SPEC: CPT

## 2024-08-16 PROCEDURE — G0463 HOSPITAL OUTPT CLINIC VISIT: HCPCS

## 2024-08-16 PROCEDURE — 85610 PROTHROMBIN TIME: CPT

## 2024-08-30 ENCOUNTER — ANTICOAGULATION VISIT (OUTPATIENT)
Dept: PHARMACY | Facility: HOSPITAL | Age: 89
End: 2024-08-30
Payer: MEDICARE

## 2024-08-30 LAB
INR PPP: 1.8 (ref 0.91–1.09)
PROTHROMBIN TIME: 21.9 SECONDS (ref 10–13.8)

## 2024-08-30 PROCEDURE — 85610 PROTHROMBIN TIME: CPT

## 2024-08-30 PROCEDURE — 36416 COLLJ CAPILLARY BLOOD SPEC: CPT

## 2024-08-30 PROCEDURE — G0463 HOSPITAL OUTPT CLINIC VISIT: HCPCS

## 2024-09-05 RX ORDER — WARFARIN SODIUM 5 MG/1
TABLET ORAL
Qty: 90 TABLET | Refills: 3 | Status: SHIPPED | OUTPATIENT
Start: 2024-09-05

## 2024-09-10 ENCOUNTER — TELEPHONE (OUTPATIENT)
Age: 89
End: 2024-09-10
Payer: MEDICARE

## 2024-09-27 ENCOUNTER — ANTICOAGULATION VISIT (OUTPATIENT)
Dept: PHARMACY | Facility: HOSPITAL | Age: 89
End: 2024-09-27
Payer: MEDICARE

## 2024-09-27 LAB
INR PPP: 2.3 (ref 0.91–1.09)
PROTHROMBIN TIME: 27.4 SECONDS (ref 10–13.8)

## 2024-09-27 PROCEDURE — 85610 PROTHROMBIN TIME: CPT

## 2024-09-27 PROCEDURE — G0463 HOSPITAL OUTPT CLINIC VISIT: HCPCS

## 2024-09-27 PROCEDURE — 36416 COLLJ CAPILLARY BLOOD SPEC: CPT

## 2024-10-17 ENCOUNTER — CLINICAL SUPPORT NO REQUIREMENTS (OUTPATIENT)
Age: 89
End: 2024-10-17
Payer: MEDICARE

## 2024-10-17 ENCOUNTER — OFFICE VISIT (OUTPATIENT)
Dept: CARDIOLOGY | Facility: CLINIC | Age: 89
End: 2024-10-17
Payer: MEDICARE

## 2024-10-17 VITALS
WEIGHT: 217.6 LBS | HEIGHT: 70 IN | SYSTOLIC BLOOD PRESSURE: 130 MMHG | HEART RATE: 61 BPM | BODY MASS INDEX: 31.15 KG/M2 | DIASTOLIC BLOOD PRESSURE: 58 MMHG

## 2024-10-17 DIAGNOSIS — I48.91 ATRIAL FIBRILLATION, UNSPECIFIED TYPE: Primary | ICD-10-CM

## 2024-10-17 DIAGNOSIS — I34.0 NONRHEUMATIC MITRAL VALVE REGURGITATION: ICD-10-CM

## 2024-10-17 DIAGNOSIS — I70.1 RENAL ARTERY STENOSIS: ICD-10-CM

## 2024-10-17 DIAGNOSIS — Z95.810 AICD (AUTOMATIC CARDIOVERTER/DEFIBRILLATOR) PRESENT: ICD-10-CM

## 2024-10-17 DIAGNOSIS — I71.20 THORACIC AORTIC ANEURYSM WITHOUT RUPTURE, UNSPECIFIED PART: ICD-10-CM

## 2024-10-17 DIAGNOSIS — I42.1 HYPERTROPHIC OBSTRUCTIVE CARDIOMYOPATHY: ICD-10-CM

## 2024-10-17 DIAGNOSIS — Z79.01 WARFARIN ANTICOAGULATION: ICD-10-CM

## 2024-10-17 DIAGNOSIS — I35.1 NONRHEUMATIC AORTIC VALVE INSUFFICIENCY: ICD-10-CM

## 2024-10-17 DIAGNOSIS — I49.5 SICK SINUS SYNDROME: ICD-10-CM

## 2024-10-25 ENCOUNTER — ANTICOAGULATION VISIT (OUTPATIENT)
Dept: PHARMACY | Facility: HOSPITAL | Age: 89
End: 2024-10-25
Payer: MEDICARE

## 2024-10-25 LAB
INR PPP: 2.3 (ref 0.91–1.09)
PROTHROMBIN TIME: 27.2 SECONDS (ref 10–13.8)

## 2024-10-25 PROCEDURE — 36416 COLLJ CAPILLARY BLOOD SPEC: CPT

## 2024-10-25 PROCEDURE — 85610 PROTHROMBIN TIME: CPT

## 2024-10-25 PROCEDURE — G0463 HOSPITAL OUTPT CLINIC VISIT: HCPCS

## 2024-11-15 PROCEDURE — 93297 REM INTERROG DEV EVAL ICPMS: CPT | Performed by: INTERNAL MEDICINE

## 2024-11-22 ENCOUNTER — ANTICOAGULATION VISIT (OUTPATIENT)
Dept: PHARMACY | Facility: HOSPITAL | Age: 89
End: 2024-11-22
Payer: MEDICARE

## 2024-11-22 LAB
INR PPP: 2.3 (ref 0.91–1.09)
PROTHROMBIN TIME: 27.5 SECONDS (ref 10–13.8)

## 2024-11-22 PROCEDURE — 36416 COLLJ CAPILLARY BLOOD SPEC: CPT

## 2024-11-22 PROCEDURE — 85610 PROTHROMBIN TIME: CPT

## 2024-11-22 PROCEDURE — G0463 HOSPITAL OUTPT CLINIC VISIT: HCPCS

## 2024-11-22 NOTE — PROGRESS NOTES
I have supervised and reviewed the notes, assessments, and/or procedures performed. I concur with the documentation of this patient encounter.    Farrukh Cutler, PharmD

## 2024-11-22 NOTE — PROGRESS NOTES
Anticoagulation Clinic Progress Note    Anticoagulation Summary  As of 2024      INR goal:  1.8-2.3   TTR:  46.2% (6.1 y)   INR used for dosin.3 (2024)   Warfarin maintenance plan:  2.5 mg every Tue; 5 mg all other days   Weekly warfarin total:  32.5 mg   No change documented:  Emilia Jackson, CASI   Plan last modified:  Piper Ochoa, Pharmacy Technician (2024)   Next INR check:  2024   Priority:  High   Target end date:  --    Indications    Atrial fibrillation [I48.91]                 Anticoagulation Episode Summary       INR check location:  --    Preferred lab:  --    Send INR reminders to:   [facility]    Comments:  **INR goal listed as 1.8-2.3 per [facility]**          Anticoagulation Care Providers       Provider Role Specialty Phone number    Shannan Red MD Referring Cardiology [phone]            Clinic Interview:  Patient Findings     Negatives:  Signs/symptoms of thrombosis, Signs/symptoms of bleeding,   Laboratory test error suspected, Change in health, Change in alcohol use,   Change in activity, Upcoming invasive procedure, Emergency department   visit, Upcoming dental procedure, Missed doses, Extra doses, Change in   medications, Change in diet/appetite, Hospital admission, Bruising, Other   complaints    Comments:  no changes reported, had skin tag removed last week with no   complications and healing well      Clinical Outcomes     Negatives:  Major bleeding event, Thromboembolic event,   Anticoagulation-related hospital admission, Anticoagulation-related ED   visit, Anticoagulation-related fatality    Comments:  no changes reported, had skin tag removed last week with no   complications and healing well        INR History:      2024    10:30 AM 2024    10:30 AM 2024    10:45 AM 2024    10:45 AM 2024    11:00 AM 10/25/2024    11:00 AM 2024    11:00 AM   Anticoagulation Monitoring   INR 2.9 2.5 2.0 1.8 2.3 2.3 2.3    INR Date 7/12/2024 7/26/2024 8/16/2024 8/30/2024 9/27/2024 10/25/2024 11/22/2024   INR Goal 1.8-2.3 1.8-2.3 1.8-2.3 1.8-2.3 1.8-2.3 1.8-2.3 1.8-2.3   Trend Same Down Same Same Same Same Same   Last Week Total 35 mg 35 mg 32.5 mg 32.5 mg 32.5 mg 32.5 mg 32.5 mg   Next Week Total 32.5 mg 32.5 mg 32.5 mg 32.5 mg 32.5 mg 32.5 mg 32.5 mg   Sun 5 mg 5 mg 5 mg 5 mg 5 mg 5 mg 5 mg   Mon 5 mg 5 mg 5 mg 5 mg 5 mg 5 mg 5 mg   Tue 5 mg 5 mg 2.5 mg 2.5 mg 2.5 mg 2.5 mg 2.5 mg   Wed 5 mg 5 mg 5 mg 5 mg 5 mg 5 mg 5 mg   Thu 5 mg 5 mg 5 mg 5 mg 5 mg 5 mg 5 mg   Fri 2.5 mg (7/12); Otherwise 5 mg 2.5 mg 5 mg 5 mg 5 mg 5 mg 5 mg   Sat 5 mg 5 mg 5 mg 5 mg 5 mg 5 mg 5 mg       Plan:  1. INR is Therapeutic today- see above in Anticoagulation Summary.  Will instruct Sacha Whitehead to Continue their warfarin regimen- see above in Anticoagulation Summary.  2. Follow up in 4 weeks  3. Patient declines warfarin refills.  4. Verbal and written information provided. Patient expresses understanding and has no further questions at this time.    Emilia Jackson [facility]

## 2024-12-20 ENCOUNTER — ANTICOAGULATION VISIT (OUTPATIENT)
Dept: PHARMACY | Facility: HOSPITAL | Age: 89
End: 2024-12-20
Payer: MEDICARE

## 2024-12-20 LAB
INR PPP: 2.7 (ref 0.91–1.09)
PROTHROMBIN TIME: 32.6 SECONDS (ref 10–13.8)

## 2024-12-20 PROCEDURE — 85610 PROTHROMBIN TIME: CPT

## 2024-12-20 PROCEDURE — 36416 COLLJ CAPILLARY BLOOD SPEC: CPT

## 2024-12-20 PROCEDURE — G0463 HOSPITAL OUTPT CLINIC VISIT: HCPCS

## 2024-12-20 NOTE — PROGRESS NOTES
Anticoagulation Clinic Progress Note    Anticoagulation Summary  As of 2024      INR goal:  1.8-2.3   TTR:  45.6% (6.2 y)   INR used for dosin.7 (2024)   Warfarin maintenance plan:  2.5 mg every Tue; 5 mg all other days   Weekly warfarin total:  32.5 mg   Plan last modified:  Piper Ochoa, Pharmacy Technician (2024)   Next INR check:  1/10/2025   Priority:  High   Target end date:  --    Indications    Atrial fibrillation [I48.91]                 Anticoagulation Episode Summary       INR check location:  --    Preferred lab:  --    Send INR reminders to:   [facility]    Comments:  **INR goal listed as 1.8-2.3 per [facility]**          Anticoagulation Care Providers       Provider Role Specialty Phone number    Shannan Red MD Referring Cardiology [phone]            Clinic Interview:  Patient Findings     Negatives:  Signs/symptoms of thrombosis, Signs/symptoms of bleeding,   Laboratory test error suspected, Change in health, Change in alcohol use,   Change in activity, Upcoming invasive procedure, Emergency department   visit, Upcoming dental procedure, Missed doses, Extra doses, Change in   medications, Change in diet/appetite, Hospital admission, Bruising, Other   complaints      Clinical Outcomes     Negatives:  Major bleeding event, Thromboembolic event,   Anticoagulation-related hospital admission, Anticoagulation-related ED   visit, Anticoagulation-related fatality        INR History:      2024    10:30 AM 2024    10:45 AM 2024    10:45 AM 2024    11:00 AM 10/25/2024    11:00 AM 2024    11:00 AM 2024    11:15 AM   Anticoagulation Monitoring   INR 2.5 2.0 1.8 2.3 2.3 2.3 2.7   INR Date 2024 2024 2024 2024 10/25/2024 2024 2024   INR Goal 1.8-2.3 1.8-2.3 1.8-2.3 1.8-2.3 1.8-2.3 1.8-2.3 1.8-2.3   Trend Down Same Same Same Same Same Same   Last Week Total 35 mg 32.5 mg 32.5 mg 32.5 mg 32.5 mg 32.5 mg  32.5 mg   Next Week Total 32.5 mg 32.5 mg 32.5 mg 32.5 mg 32.5 mg 32.5 mg 30 mg   Sun 5 mg 5 mg 5 mg 5 mg 5 mg 5 mg 5 mg   Mon 5 mg 5 mg 5 mg 5 mg 5 mg 5 mg 5 mg   Tue 5 mg 2.5 mg 2.5 mg 2.5 mg 2.5 mg 2.5 mg 2.5 mg   Wed 5 mg 5 mg 5 mg 5 mg 5 mg 5 mg 5 mg   Thu 5 mg 5 mg 5 mg 5 mg 5 mg 5 mg 5 mg   Fri 2.5 mg 5 mg 5 mg 5 mg 5 mg 5 mg 2.5 mg (12/20); Otherwise 5 mg   Sat 5 mg 5 mg 5 mg 5 mg 5 mg 5 mg 5 mg       Plan:  1. INR is Supratherapeutic today- see above in Anticoagulation Summary.  Will instruct Sacha EVANGELISTA Whitehead to Change their warfarin regimen (2.5 mg today, then resume 5 mg daily) - see above in Anticoagulation Summary.  2. Follow up in 3 weeks.  3. Patient declines warfarin refills.  4. Verbal and written information provided. Patient expresses understanding and has no further questions at this time.    Farrukh Cutler, PharmD

## 2025-01-01 ENCOUNTER — ANTICOAGULATION VISIT (OUTPATIENT)
Dept: PHARMACY | Facility: HOSPITAL | Age: OVER 89
End: 2025-01-01
Payer: MEDICARE

## 2025-01-01 ENCOUNTER — HOSPITAL ENCOUNTER (OUTPATIENT)
Facility: HOSPITAL | Age: OVER 89
Setting detail: OBSERVATION
End: 2025-06-27
Attending: EMERGENCY MEDICINE | Admitting: HOSPITALIST
Payer: MEDICARE

## 2025-01-01 VITALS
WEIGHT: 207.67 LBS | HEIGHT: 70 IN | TEMPERATURE: 97.4 F | RESPIRATION RATE: 15 BRPM | SYSTOLIC BLOOD PRESSURE: 130 MMHG | BODY MASS INDEX: 29.73 KG/M2 | DIASTOLIC BLOOD PRESSURE: 72 MMHG | HEART RATE: 90 BPM | OXYGEN SATURATION: 84 %

## 2025-01-01 DIAGNOSIS — N17.9 AKI (ACUTE KIDNEY INJURY): Primary | ICD-10-CM

## 2025-01-01 LAB
ALBUMIN SERPL-MCNC: 3.5 G/DL (ref 3.5–5.2)
ALBUMIN/GLOB SERPL: 1.3 G/DL
ALP SERPL-CCNC: 86 U/L (ref 39–117)
ALT SERPL W P-5'-P-CCNC: 11 U/L (ref 1–41)
ANION GAP SERPL CALCULATED.3IONS-SCNC: 10.8 MMOL/L (ref 5–15)
ANION GAP SERPL CALCULATED.3IONS-SCNC: 13.7 MMOL/L (ref 5–15)
AST SERPL-CCNC: 18 U/L (ref 1–40)
BASOPHILS # BLD AUTO: 0.01 10*3/MM3 (ref 0–0.2)
BASOPHILS # BLD AUTO: 0.02 10*3/MM3 (ref 0–0.2)
BASOPHILS NFR BLD AUTO: 0.2 % (ref 0–1.5)
BASOPHILS NFR BLD AUTO: 0.3 % (ref 0–1.5)
BILIRUB SERPL-MCNC: 0.5 MG/DL (ref 0–1.2)
BUN SERPL-MCNC: 49 MG/DL (ref 8–23)
BUN SERPL-MCNC: 50 MG/DL (ref 8–23)
BUN/CREAT SERPL: 22.4 (ref 7–25)
BUN/CREAT SERPL: 24.4 (ref 7–25)
CALCIUM SPEC-SCNC: 9.2 MG/DL (ref 8.2–9.6)
CALCIUM SPEC-SCNC: 9.2 MG/DL (ref 8.2–9.6)
CHLORIDE SERPL-SCNC: 107 MMOL/L (ref 98–107)
CHLORIDE SERPL-SCNC: 107 MMOL/L (ref 98–107)
CO2 SERPL-SCNC: 24.3 MMOL/L (ref 22–29)
CO2 SERPL-SCNC: 25.2 MMOL/L (ref 22–29)
CREAT SERPL-MCNC: 2.01 MG/DL (ref 0.76–1.27)
CREAT SERPL-MCNC: 2.23 MG/DL (ref 0.76–1.27)
DEPRECATED RDW RBC AUTO: 68.5 FL (ref 37–54)
DEPRECATED RDW RBC AUTO: 70.5 FL (ref 37–54)
EGFRCR SERPLBLD CKD-EPI 2021: 26.5 ML/MIN/1.73
EGFRCR SERPLBLD CKD-EPI 2021: 30 ML/MIN/1.73
EOSINOPHIL # BLD AUTO: 0.02 10*3/MM3 (ref 0–0.4)
EOSINOPHIL # BLD AUTO: 0.05 10*3/MM3 (ref 0–0.4)
EOSINOPHIL NFR BLD AUTO: 0.3 % (ref 0.3–6.2)
EOSINOPHIL NFR BLD AUTO: 0.9 % (ref 0.3–6.2)
ERYTHROCYTE [DISTWIDTH] IN BLOOD BY AUTOMATED COUNT: 18.4 % (ref 12.3–15.4)
ERYTHROCYTE [DISTWIDTH] IN BLOOD BY AUTOMATED COUNT: 18.5 % (ref 12.3–15.4)
GLOBULIN UR ELPH-MCNC: 2.7 GM/DL
GLUCOSE SERPL-MCNC: 137 MG/DL (ref 65–99)
GLUCOSE SERPL-MCNC: 140 MG/DL (ref 65–99)
HCT VFR BLD AUTO: 29.3 % (ref 37.5–51)
HCT VFR BLD AUTO: 30.8 % (ref 37.5–51)
HGB BLD-MCNC: 9.1 G/DL (ref 13–17.7)
HGB BLD-MCNC: 9.7 G/DL (ref 13–17.7)
IMM GRANULOCYTES # BLD AUTO: 0.03 10*3/MM3 (ref 0–0.05)
IMM GRANULOCYTES # BLD AUTO: 0.08 10*3/MM3 (ref 0–0.05)
IMM GRANULOCYTES NFR BLD AUTO: 0.5 % (ref 0–0.5)
IMM GRANULOCYTES NFR BLD AUTO: 1.2 % (ref 0–0.5)
LYMPHOCYTES # BLD AUTO: 0.42 10*3/MM3 (ref 0.7–3.1)
LYMPHOCYTES # BLD AUTO: 0.45 10*3/MM3 (ref 0.7–3.1)
LYMPHOCYTES NFR BLD AUTO: 6.1 % (ref 19.6–45.3)
LYMPHOCYTES NFR BLD AUTO: 8.2 % (ref 19.6–45.3)
MCH RBC QN AUTO: 32 PG (ref 26.6–33)
MCH RBC QN AUTO: 32.4 PG (ref 26.6–33)
MCHC RBC AUTO-ENTMCNC: 31.1 G/DL (ref 31.5–35.7)
MCHC RBC AUTO-ENTMCNC: 31.5 G/DL (ref 31.5–35.7)
MCV RBC AUTO: 103 FL (ref 79–97)
MCV RBC AUTO: 103.2 FL (ref 79–97)
MONOCYTES # BLD AUTO: 0.53 10*3/MM3 (ref 0.1–0.9)
MONOCYTES # BLD AUTO: 0.71 10*3/MM3 (ref 0.1–0.9)
MONOCYTES NFR BLD AUTO: 10.3 % (ref 5–12)
MONOCYTES NFR BLD AUTO: 9.6 % (ref 5–12)
NEUTROPHILS NFR BLD AUTO: 4.45 10*3/MM3 (ref 1.7–7)
NEUTROPHILS NFR BLD AUTO: 5.65 10*3/MM3 (ref 1.7–7)
NEUTROPHILS NFR BLD AUTO: 80.6 % (ref 42.7–76)
NEUTROPHILS NFR BLD AUTO: 81.8 % (ref 42.7–76)
NRBC BLD AUTO-RTO: 0 /100 WBC (ref 0–0.2)
NRBC BLD AUTO-RTO: 0 /100 WBC (ref 0–0.2)
NT-PROBNP SERPL-MCNC: 4840 PG/ML (ref 0–1800)
PLATELET # BLD AUTO: 169 10*3/MM3 (ref 140–450)
PLATELET # BLD AUTO: 175 10*3/MM3 (ref 140–450)
PMV BLD AUTO: 10.3 FL (ref 6–12)
PMV BLD AUTO: 9.9 FL (ref 6–12)
POTASSIUM SERPL-SCNC: 3.7 MMOL/L (ref 3.5–5.2)
POTASSIUM SERPL-SCNC: 3.8 MMOL/L (ref 3.5–5.2)
PROT SERPL-MCNC: 6.2 G/DL (ref 6–8.5)
RBC # BLD AUTO: 2.84 10*6/MM3 (ref 4.14–5.8)
RBC # BLD AUTO: 2.99 10*6/MM3 (ref 4.14–5.8)
SODIUM SERPL-SCNC: 143 MMOL/L (ref 136–145)
SODIUM SERPL-SCNC: 145 MMOL/L (ref 136–145)
WBC NRBC COR # BLD AUTO: 5.52 10*3/MM3 (ref 3.4–10.8)
WBC NRBC COR # BLD AUTO: 6.9 10*3/MM3 (ref 3.4–10.8)

## 2025-01-01 PROCEDURE — 36415 COLL VENOUS BLD VENIPUNCTURE: CPT

## 2025-01-01 PROCEDURE — G0378 HOSPITAL OBSERVATION PER HR: HCPCS

## 2025-01-01 PROCEDURE — 85025 COMPLETE CBC W/AUTO DIFF WBC: CPT | Performed by: EMERGENCY MEDICINE

## 2025-01-01 PROCEDURE — 85025 COMPLETE CBC W/AUTO DIFF WBC: CPT | Performed by: HOSPITALIST

## 2025-01-01 PROCEDURE — 80048 BASIC METABOLIC PNL TOTAL CA: CPT | Performed by: HOSPITALIST

## 2025-01-01 PROCEDURE — 25810000003 SODIUM CHLORIDE 0.9 % SOLUTION: Performed by: EMERGENCY MEDICINE

## 2025-01-01 PROCEDURE — 80053 COMPREHEN METABOLIC PANEL: CPT | Performed by: EMERGENCY MEDICINE

## 2025-01-01 PROCEDURE — 83880 ASSAY OF NATRIURETIC PEPTIDE: CPT | Performed by: EMERGENCY MEDICINE

## 2025-01-01 PROCEDURE — 99285 EMERGENCY DEPT VISIT HI MDM: CPT

## 2025-01-01 RX ORDER — IPRATROPIUM BROMIDE AND ALBUTEROL SULFATE 2.5; .5 MG/3ML; MG/3ML
3 SOLUTION RESPIRATORY (INHALATION) EVERY 4 HOURS PRN
Status: DISCONTINUED | OUTPATIENT
Start: 2025-01-01 | End: 2025-01-01

## 2025-01-01 RX ORDER — FAMOTIDINE 20 MG/1
20 TABLET, FILM COATED ORAL 2 TIMES DAILY
Status: DISCONTINUED | OUTPATIENT
Start: 2025-01-01 | End: 2025-01-01

## 2025-01-01 RX ORDER — AMMONIUM LACTATE 12 G/100G
1 CREAM TOPICAL 2 TIMES DAILY
COMMUNITY

## 2025-01-01 RX ORDER — MULTIVITAMIN WITH IRON
1000 TABLET ORAL DAILY
Status: DISCONTINUED | OUTPATIENT
Start: 2025-01-01 | End: 2025-01-01

## 2025-01-01 RX ORDER — FERROUS SULFATE 325(65) MG
325 TABLET ORAL 2 TIMES DAILY
COMMUNITY

## 2025-01-01 RX ORDER — FERROUS SULFATE 325(65) MG
325 TABLET ORAL 2 TIMES DAILY
Status: DISCONTINUED | OUTPATIENT
Start: 2025-01-01 | End: 2025-01-01

## 2025-01-01 RX ORDER — SODIUM CHLORIDE 0.9 % (FLUSH) 0.9 %
10 SYRINGE (ML) INJECTION AS NEEDED
Status: DISCONTINUED | OUTPATIENT
Start: 2025-01-01 | End: 2025-01-01

## 2025-01-01 RX ORDER — WARFARIN SODIUM 5 MG/1
TABLET ORAL
Qty: 90 TABLET | Refills: 3 | Status: CANCELLED | OUTPATIENT
Start: 2025-01-01

## 2025-01-01 RX ORDER — LORAZEPAM 2 MG/ML
0.5 INJECTION INTRAMUSCULAR
Status: DISCONTINUED | OUTPATIENT
Start: 2025-01-01 | End: 2025-01-01 | Stop reason: HOSPADM

## 2025-01-01 RX ORDER — IPRATROPIUM BROMIDE AND ALBUTEROL SULFATE 2.5; .5 MG/3ML; MG/3ML
3 SOLUTION RESPIRATORY (INHALATION) EVERY 4 HOURS PRN
COMMUNITY

## 2025-01-01 RX ORDER — MORPHINE SULFATE 2 MG/ML
1 INJECTION, SOLUTION INTRAMUSCULAR; INTRAVENOUS
Status: DISCONTINUED | OUTPATIENT
Start: 2025-01-01 | End: 2025-01-01 | Stop reason: HOSPADM

## 2025-01-01 RX ORDER — ATORVASTATIN CALCIUM 10 MG/1
10 TABLET, FILM COATED ORAL NIGHTLY
Status: DISCONTINUED | OUTPATIENT
Start: 2025-01-01 | End: 2025-01-01

## 2025-01-01 RX ORDER — ASCORBIC ACID 500 MG
500 TABLET ORAL DAILY
Status: DISCONTINUED | OUTPATIENT
Start: 2025-01-01 | End: 2025-01-01

## 2025-01-01 RX ORDER — NYSTATIN 100000 [USP'U]/G
1 POWDER TOPICAL 2 TIMES DAILY
COMMUNITY

## 2025-01-01 RX ORDER — SODIUM CHLORIDE 450 MG/100ML
75 INJECTION, SOLUTION INTRAVENOUS CONTINUOUS
Status: DISCONTINUED | OUTPATIENT
Start: 2025-01-01 | End: 2025-01-01

## 2025-01-01 RX ORDER — FOLIC ACID 1 MG/1
1000 TABLET ORAL DAILY
Status: DISCONTINUED | OUTPATIENT
Start: 2025-01-01 | End: 2025-01-01

## 2025-01-01 RX ORDER — AMOXICILLIN 250 MG
2 CAPSULE ORAL 2 TIMES DAILY PRN
Status: DISCONTINUED | OUTPATIENT
Start: 2025-01-01 | End: 2025-01-01

## 2025-01-01 RX ORDER — CARVEDILOL 6.25 MG/1
6.25 TABLET ORAL 2 TIMES DAILY WITH MEALS
Status: DISCONTINUED | OUTPATIENT
Start: 2025-01-01 | End: 2025-01-01

## 2025-01-01 RX ADMIN — FOLIC ACID 1000 MCG: 1 TABLET ORAL at 09:42

## 2025-01-01 RX ADMIN — OXYCODONE HYDROCHLORIDE AND ACETAMINOPHEN 500 MG: 500 TABLET ORAL at 09:42

## 2025-01-01 RX ADMIN — FAMOTIDINE 20 MG: 20 TABLET, FILM COATED ORAL at 09:45

## 2025-01-01 RX ADMIN — SODIUM CHLORIDE 500 ML: 9 INJECTION, SOLUTION INTRAVENOUS at 20:39

## 2025-01-01 RX ADMIN — FERROUS SULFATE TAB 325 MG (65 MG ELEMENTAL FE) 325 MG: 325 (65 FE) TAB at 09:42

## 2025-01-01 RX ADMIN — Medication 1000 MCG: at 09:42

## 2025-01-01 RX ADMIN — SODIUM CHLORIDE 75 ML/HR: 450 INJECTION, SOLUTION INTRAVENOUS at 04:31

## 2025-02-07 ENCOUNTER — ANTICOAGULATION VISIT (OUTPATIENT)
Dept: PHARMACY | Facility: HOSPITAL | Age: OVER 89
End: 2025-02-07
Payer: MEDICARE

## 2025-02-07 LAB
INR PPP: 2.1 (ref 0.91–1.09)
PROTHROMBIN TIME: 25 SECONDS (ref 10–13.8)

## 2025-02-07 PROCEDURE — G0463 HOSPITAL OUTPT CLINIC VISIT: HCPCS

## 2025-02-07 PROCEDURE — 85610 PROTHROMBIN TIME: CPT

## 2025-02-07 NOTE — PROGRESS NOTES
Anticoagulation Clinic Progress Note    Anticoagulation Summary  As of 2025      INR goal:  1.8-2.3   TTR:  45.3% (6.3 y)   INR used for dosin.1 (2025)   Warfarin maintenance plan:  2.5 mg every Tue; 5 mg all other days   Weekly warfarin total:  32.5 mg   No change documented:  Farrukh Cutler, PharmD   Plan last modified:  Piper Ochoa, Pharmacy Technician (2024)   Next INR check:  3/7/2025   Priority:  High   Target end date:  --    Indications    Atrial fibrillation [I48.91]                 Anticoagulation Episode Summary       INR check location:  --    Preferred lab:  --    Send INR reminders to:   [facility]    Comments:  **INR goal listed as 1.8-2.3 per [facility]**          Anticoagulation Care Providers       Provider Role Specialty Phone number    Shannan Red MD Referring Cardiology [phone]            Clinic Interview:  Patient Findings     Negatives:  Signs/symptoms of thrombosis, Signs/symptoms of bleeding,   Laboratory test error suspected, Change in health, Change in alcohol use,   Change in activity, Upcoming invasive procedure, Emergency department   visit, Upcoming dental procedure, Missed doses, Extra doses, Change in   medications, Change in diet/appetite, Hospital admission, Bruising, Other   complaints      Clinical Outcomes     Negatives:  Major bleeding event, Thromboembolic event,   Anticoagulation-related hospital admission, Anticoagulation-related ED   visit, Anticoagulation-related fatality        INR History:      2024    10:45 AM 2024    10:45 AM 2024    11:00 AM 10/25/2024    11:00 AM 2024    11:00 AM 2024    11:15 AM 2025    11:00 AM   Anticoagulation Monitoring   INR 2.0 1.8 2.3 2.3 2.3 2.7 2.1   INR Date 2024 2024 2024 10/25/2024 2024 2024 2025   INR Goal 1.8-2.3 1.8-2.3 1.8-2.3 1.8-2.3 1.8-2.3 1.8-2.3 1.8-2.3   Trend Same Same Same Same Same Same Same   Last Week Total 32.5 mg  32.5 mg 32.5 mg 32.5 mg 32.5 mg 32.5 mg 32.5 mg   Next Week Total 32.5 mg 32.5 mg 32.5 mg 32.5 mg 32.5 mg 30 mg 32.5 mg   Sun 5 mg 5 mg 5 mg 5 mg 5 mg 5 mg 5 mg   Mon 5 mg 5 mg 5 mg 5 mg 5 mg 5 mg 5 mg   Tue 2.5 mg 2.5 mg 2.5 mg 2.5 mg 2.5 mg 2.5 mg 2.5 mg   Wed 5 mg 5 mg 5 mg 5 mg 5 mg 5 mg 5 mg   Thu 5 mg 5 mg 5 mg 5 mg 5 mg 5 mg 5 mg   Fri 5 mg 5 mg 5 mg 5 mg 5 mg 2.5 mg (12/20); Otherwise 5 mg 5 mg   Sat 5 mg 5 mg 5 mg 5 mg 5 mg 5 mg 5 mg       Plan:  1. INR is Therapeutic today- see above in Anticoagulation Summary.  Will instruct Sacha Whitehead to Continue their warfarin regimen- see above in Anticoagulation Summary.  2. Follow up in 4 weeks.  3. Patient declines warfarin refills.  4. Verbal and written information provided. Patient expresses understanding and has no further questions at this time.    Farrukh Cutler, PharmD

## 2025-02-16 PROCEDURE — 93297 REM INTERROG DEV EVAL ICPMS: CPT | Performed by: INTERNAL MEDICINE

## 2025-03-07 ENCOUNTER — ANTICOAGULATION VISIT (OUTPATIENT)
Dept: PHARMACY | Facility: HOSPITAL | Age: OVER 89
End: 2025-03-07
Payer: MEDICARE

## 2025-03-07 LAB
INR PPP: 2.4 (ref 0.91–1.09)
PROTHROMBIN TIME: 28.5 SECONDS (ref 10–13.8)

## 2025-03-07 PROCEDURE — G0463 HOSPITAL OUTPT CLINIC VISIT: HCPCS

## 2025-03-07 PROCEDURE — 85610 PROTHROMBIN TIME: CPT

## 2025-03-07 NOTE — PROGRESS NOTES
Anticoagulation Clinic Progress Note    Anticoagulation Summary  As of 3/7/2025      INR goal:  1.8-2.3   TTR:  45.6% (6.4 y)   INR used for dosin.4 (3/7/2025)   Warfarin maintenance plan:  2.5 mg every Tue; 5 mg all other days   Weekly warfarin total:  32.5 mg   No change documented:  Zurdo Kemp, Pharmacy Intern   Plan last modified:  Piper Ochoa, Pharmacy Technician (2024)   Next INR check:  2025   Priority:  High   Target end date:  --    Indications    Atrial fibrillation [I48.91]                 Anticoagulation Episode Summary       INR check location:  --    Preferred lab:  --    Send INR reminders to:  [facility]    Comments:  **INR goal listed as 1.8-2.3 per [facility]**          Anticoagulation Care Providers       Provider Role Specialty Phone number    Shannan Red MD Referring Cardiology [phone]            Clinic Interview:  Patient Findings     Negatives:  Signs/symptoms of thrombosis, Signs/symptoms of bleeding,   Laboratory test error suspected, Change in health, Change in alcohol use,   Change in activity, Upcoming invasive procedure, Emergency department   visit, Upcoming dental procedure, Missed doses, Extra doses, Change in   medications, Change in diet/appetite, Hospital admission, Bruising, Other   complaints    Comments:  INR = 2.4. No changes, Continue same dosing regimen (2.5 mg   Tu, 5 mg AODs). Recheck in 4 weeks.       Clinical Outcomes     Negatives:  Major bleeding event, Thromboembolic event,   Anticoagulation-related hospital admission, Anticoagulation-related ED   visit, Anticoagulation-related fatality    Comments:  INR = 2.4. No changes, Continue same dosing regimen (2.5 mg   Tu, 5 mg AODs). Recheck in 4 weeks.         INR History:      2024    10:45 AM 2024    11:00 AM 10/25/2024    11:00 AM 2024    11:00 AM 2024    11:15 AM 2025    11:00 AM 3/7/2025    11:30 AM   Anticoagulation Monitoring   INR 1.8  2.3 2.3 2.3 2.7 2.1 2.4   INR Date 8/30/2024 9/27/2024 10/25/2024 11/22/2024 12/20/2024 2/7/2025 3/7/2025   INR Goal 1.8-2.3 1.8-2.3 1.8-2.3 1.8-2.3 1.8-2.3 1.8-2.3 1.8-2.3   Trend Same Same Same Same Same Same Same   Last Week Total 32.5 mg 32.5 mg 32.5 mg 32.5 mg 32.5 mg 32.5 mg 32.5 mg   Next Week Total 32.5 mg 32.5 mg 32.5 mg 32.5 mg 30 mg 32.5 mg 32.5 mg   Sun 5 mg 5 mg 5 mg 5 mg 5 mg 5 mg 5 mg   Mon 5 mg 5 mg 5 mg 5 mg 5 mg 5 mg 5 mg   Tue 2.5 mg 2.5 mg 2.5 mg 2.5 mg 2.5 mg 2.5 mg 2.5 mg   Wed 5 mg 5 mg 5 mg 5 mg 5 mg 5 mg 5 mg   Thu 5 mg 5 mg 5 mg 5 mg 5 mg 5 mg 5 mg   Fri 5 mg 5 mg 5 mg 5 mg 2.5 mg (12/20); Otherwise 5 mg 5 mg 5 mg   Sat 5 mg 5 mg 5 mg 5 mg 5 mg 5 mg 5 mg       Plan:  1. INR is Supratherapeutic today- see above in Anticoagulation Summary.  Will instruct Sacha Whitehead to Continue their warfarin regimen- see above in Anticoagulation Summary.  2. Follow up in 4 weeks  3. Patient declines warfarin refills.  4. Verbal and written information provided. Patient expresses understanding and has no further questions at this time.    Rievra [place], Pharmacy Intern

## 2025-03-07 NOTE — PROGRESS NOTES
I have supervised and reviewed the notes, assessments, and/or procedures performed. The documented assessment and plan were developed cooperatively, and the plan was implemented in my presence. I concur with the documentation of this patient encounter.    Beatriz Snyder, [facility]

## 2025-03-11 ENCOUNTER — TELEPHONE (OUTPATIENT)
Age: OVER 89
End: 2025-03-11
Payer: MEDICARE

## 2025-03-11 NOTE — TELEPHONE ENCOUNTER
Will his device flip modes at [facility]?    Can we bring him to clinic to talk about downgrading device to just a pacer?

## 2025-03-11 NOTE — TELEPHONE ENCOUNTER
Patient with DDI/ICD, remote transmission reviewed today documented battery @ 2.73 V. VIVIAN = 2.73 V. Device hasn't triggered VIVIAN yet battery documented @ 1 month < 3 months.  Patient follows with Dr. Red and Dr. Dewitt, you haven't sen the patient in clinic. Per review of previous transmission available in care link patient has never had therapy from device, he does RV pace @ 28 % with lower rate @ 50 BPM. Just wanted to bring this to your attention as he is approaching VIVIAN soon.    Device and leads:

## 2025-03-17 RX ORDER — FUROSEMIDE 20 MG/1
10 TABLET ORAL DAILY
Qty: 45 TABLET | Refills: 3 | OUTPATIENT
Start: 2025-03-17

## 2025-03-17 NOTE — TELEPHONE ENCOUNTER
Patient with VVI/ICD device has reached RRT on 3/14/25, RVP documentation @ 30 % with device programmed @ 50 BPM. Per your request on 3/11 patient TBS to come in and discuss plan when device.

## 2025-05-13 ENCOUNTER — APPOINTMENT (OUTPATIENT)
Dept: GENERAL RADIOLOGY | Facility: HOSPITAL | Age: OVER 89
End: 2025-05-13
Payer: MEDICARE

## 2025-05-13 ENCOUNTER — HOSPITAL ENCOUNTER (INPATIENT)
Facility: HOSPITAL | Age: OVER 89
LOS: 9 days | Discharge: NURSING, REHAB OR LONG-TERM CARE | End: 2025-05-22
Attending: EMERGENCY MEDICINE | Admitting: STUDENT IN AN ORGANIZED HEALTH CARE EDUCATION/TRAINING PROGRAM
Payer: MEDICARE

## 2025-05-13 DIAGNOSIS — T45.511A TOXIC EFFECT OF WARFARIN, UNINTENTIONAL, INITIAL ENCOUNTER: ICD-10-CM

## 2025-05-13 DIAGNOSIS — D64.9 SYMPTOMATIC ANEMIA: Primary | ICD-10-CM

## 2025-05-13 DIAGNOSIS — K92.2 GASTROINTESTINAL HEMORRHAGE, UNSPECIFIED GASTROINTESTINAL HEMORRHAGE TYPE: ICD-10-CM

## 2025-05-13 PROBLEM — R79.1 SUPRATHERAPEUTIC INR: Status: ACTIVE | Noted: 2025-05-13

## 2025-05-13 LAB
ABO GROUP BLD: NORMAL
ALBUMIN SERPL-MCNC: 3.5 G/DL (ref 3.5–5.2)
ALBUMIN/GLOB SERPL: 1.5 G/DL
ALP SERPL-CCNC: 108 U/L (ref 39–117)
ALT SERPL W P-5'-P-CCNC: 20 U/L (ref 1–41)
ANION GAP SERPL CALCULATED.3IONS-SCNC: 11.4 MMOL/L (ref 5–15)
AST SERPL-CCNC: 21 U/L (ref 1–40)
BASOPHILS # BLD AUTO: 0.01 10*3/MM3 (ref 0–0.2)
BASOPHILS NFR BLD AUTO: 0.2 % (ref 0–1.5)
BILIRUB SERPL-MCNC: 0.5 MG/DL (ref 0–1.2)
BLD GP AB SCN SERPL QL: NEGATIVE
BUN SERPL-MCNC: 38 MG/DL (ref 8–23)
BUN/CREAT SERPL: 30.6 (ref 7–25)
CALCIUM SPEC-SCNC: 9.1 MG/DL (ref 8.2–9.6)
CHLORIDE SERPL-SCNC: 111 MMOL/L (ref 98–107)
CO2 SERPL-SCNC: 22.6 MMOL/L (ref 22–29)
CREAT SERPL-MCNC: 1.24 MG/DL (ref 0.76–1.27)
DEPRECATED RDW RBC AUTO: 52.9 FL (ref 37–54)
EGFRCR SERPLBLD CKD-EPI 2021: 53.5 ML/MIN/1.73
EOSINOPHIL # BLD AUTO: 0.07 10*3/MM3 (ref 0–0.4)
EOSINOPHIL NFR BLD AUTO: 1.6 % (ref 0.3–6.2)
ERYTHROCYTE [DISTWIDTH] IN BLOOD BY AUTOMATED COUNT: 14.5 % (ref 12.3–15.4)
EXPIRATION DATE: ABNORMAL
FECAL OCCULT BLOOD SCREEN, POC: POSITIVE
FERRITIN SERPL-MCNC: 25.5 NG/ML (ref 30–400)
GEN 5 1HR TROPONIN T REFLEX: 61 NG/L
GLOBULIN UR ELPH-MCNC: 2.4 GM/DL
GLUCOSE BLDC GLUCOMTR-MCNC: 141 MG/DL (ref 70–130)
GLUCOSE BLDC GLUCOMTR-MCNC: 191 MG/DL (ref 70–130)
GLUCOSE SERPL-MCNC: 125 MG/DL (ref 65–99)
HBA1C MFR BLD: 6 % (ref 4.8–5.6)
HCT VFR BLD AUTO: 17.6 % (ref 37.5–51)
HCT VFR BLD AUTO: 18.9 % (ref 37.5–51)
HGB BLD-MCNC: 5.3 G/DL (ref 13–17.7)
HGB BLD-MCNC: 6 G/DL (ref 13–17.7)
IMM GRANULOCYTES # BLD AUTO: 0.01 10*3/MM3 (ref 0–0.05)
IMM GRANULOCYTES NFR BLD AUTO: 0.2 % (ref 0–0.5)
INR PPP: >10 (ref 0.9–1.1)
IRON 24H UR-MRATE: 26 MCG/DL (ref 59–158)
IRON SATN MFR SERPL: 6 % (ref 20–50)
LYMPHOCYTES # BLD AUTO: 0.63 10*3/MM3 (ref 0.7–3.1)
LYMPHOCYTES NFR BLD AUTO: 14.3 % (ref 19.6–45.3)
Lab: 283
MCH RBC QN AUTO: 30.6 PG (ref 26.6–33)
MCHC RBC AUTO-ENTMCNC: 30.1 G/DL (ref 31.5–35.7)
MCV RBC AUTO: 101.7 FL (ref 79–97)
MONOCYTES # BLD AUTO: 0.69 10*3/MM3 (ref 0.1–0.9)
MONOCYTES NFR BLD AUTO: 15.7 % (ref 5–12)
NEGATIVE CONTROL: NEGATIVE
NEUTROPHILS NFR BLD AUTO: 2.99 10*3/MM3 (ref 1.7–7)
NEUTROPHILS NFR BLD AUTO: 68 % (ref 42.7–76)
NRBC BLD AUTO-RTO: 0.9 /100 WBC (ref 0–0.2)
NT-PROBNP SERPL-MCNC: 792 PG/ML (ref 0–1800)
PLATELET # BLD AUTO: 138 10*3/MM3 (ref 140–450)
PMV BLD AUTO: 10.4 FL (ref 6–12)
POSITIVE CONTROL: POSITIVE
POTASSIUM SERPL-SCNC: 4.9 MMOL/L (ref 3.5–5.2)
PROT SERPL-MCNC: 5.9 G/DL (ref 6–8.5)
PROTHROMBIN TIME: 103.7 SECONDS (ref 11.7–14.2)
QT INTERVAL: 443 MS
QTC INTERVAL: 449 MS
RBC # BLD AUTO: 1.73 10*6/MM3 (ref 4.14–5.8)
RH BLD: POSITIVE
SODIUM SERPL-SCNC: 145 MMOL/L (ref 136–145)
T&S EXPIRATION DATE: NORMAL
TIBC SERPL-MCNC: 428 MCG/DL (ref 298–536)
TRANSFERRIN SERPL-MCNC: 287 MG/DL (ref 200–360)
TROPONIN T % DELTA: -28
TROPONIN T NUMERIC DELTA: -24 NG/L
TROPONIN T SERPL HS-MCNC: 85 NG/L
WBC NRBC COR # BLD AUTO: 4.4 10*3/MM3 (ref 3.4–10.8)

## 2025-05-13 PROCEDURE — 99291 CRITICAL CARE FIRST HOUR: CPT

## 2025-05-13 PROCEDURE — 85610 PROTHROMBIN TIME: CPT | Performed by: EMERGENCY MEDICINE

## 2025-05-13 PROCEDURE — 93010 ELECTROCARDIOGRAM REPORT: CPT | Performed by: INTERNAL MEDICINE

## 2025-05-13 PROCEDURE — 82728 ASSAY OF FERRITIN: CPT | Performed by: STUDENT IN AN ORGANIZED HEALTH CARE EDUCATION/TRAINING PROGRAM

## 2025-05-13 PROCEDURE — 86900 BLOOD TYPING SEROLOGIC ABO: CPT

## 2025-05-13 PROCEDURE — 86900 BLOOD TYPING SEROLOGIC ABO: CPT | Performed by: EMERGENCY MEDICINE

## 2025-05-13 PROCEDURE — 93005 ELECTROCARDIOGRAM TRACING: CPT | Performed by: EMERGENCY MEDICINE

## 2025-05-13 PROCEDURE — 83036 HEMOGLOBIN GLYCOSYLATED A1C: CPT | Performed by: STUDENT IN AN ORGANIZED HEALTH CARE EDUCATION/TRAINING PROGRAM

## 2025-05-13 PROCEDURE — 83540 ASSAY OF IRON: CPT | Performed by: STUDENT IN AN ORGANIZED HEALTH CARE EDUCATION/TRAINING PROGRAM

## 2025-05-13 PROCEDURE — 99222 1ST HOSP IP/OBS MODERATE 55: CPT | Performed by: INTERNAL MEDICINE

## 2025-05-13 PROCEDURE — 63710000001 INSULIN LISPRO (HUMAN) PER 5 UNITS: Performed by: STUDENT IN AN ORGANIZED HEALTH CARE EDUCATION/TRAINING PROGRAM

## 2025-05-13 PROCEDURE — 36415 COLL VENOUS BLD VENIPUNCTURE: CPT

## 2025-05-13 PROCEDURE — 84484 ASSAY OF TROPONIN QUANT: CPT | Performed by: EMERGENCY MEDICINE

## 2025-05-13 PROCEDURE — 85014 HEMATOCRIT: CPT | Performed by: INTERNAL MEDICINE

## 2025-05-13 PROCEDURE — 83880 ASSAY OF NATRIURETIC PEPTIDE: CPT | Performed by: EMERGENCY MEDICINE

## 2025-05-13 PROCEDURE — 86920 COMPATIBILITY TEST SPIN: CPT

## 2025-05-13 PROCEDURE — 71045 X-RAY EXAM CHEST 1 VIEW: CPT

## 2025-05-13 PROCEDURE — 82948 REAGENT STRIP/BLOOD GLUCOSE: CPT

## 2025-05-13 PROCEDURE — P9016 RBC LEUKOCYTES REDUCED: HCPCS

## 2025-05-13 PROCEDURE — 86901 BLOOD TYPING SEROLOGIC RH(D): CPT

## 2025-05-13 PROCEDURE — 80053 COMPREHEN METABOLIC PANEL: CPT | Performed by: EMERGENCY MEDICINE

## 2025-05-13 PROCEDURE — 36430 TRANSFUSION BLD/BLD COMPNT: CPT

## 2025-05-13 PROCEDURE — 25010000002 PHYTONADIONE 10 MG/ML SOLUTION 1 ML AMPULE: Performed by: EMERGENCY MEDICINE

## 2025-05-13 PROCEDURE — 84466 ASSAY OF TRANSFERRIN: CPT | Performed by: STUDENT IN AN ORGANIZED HEALTH CARE EDUCATION/TRAINING PROGRAM

## 2025-05-13 PROCEDURE — 82270 OCCULT BLOOD FECES: CPT | Performed by: EMERGENCY MEDICINE

## 2025-05-13 PROCEDURE — 99222 1ST HOSP IP/OBS MODERATE 55: CPT | Performed by: STUDENT IN AN ORGANIZED HEALTH CARE EDUCATION/TRAINING PROGRAM

## 2025-05-13 PROCEDURE — 86923 COMPATIBILITY TEST ELECTRIC: CPT

## 2025-05-13 PROCEDURE — 86901 BLOOD TYPING SEROLOGIC RH(D): CPT | Performed by: EMERGENCY MEDICINE

## 2025-05-13 PROCEDURE — 25010000002 PROTHROMBIN COMPLEX CONC HUMAN 1000 UNITS KIT: Performed by: EMERGENCY MEDICINE

## 2025-05-13 PROCEDURE — 86850 RBC ANTIBODY SCREEN: CPT | Performed by: EMERGENCY MEDICINE

## 2025-05-13 PROCEDURE — 85025 COMPLETE CBC W/AUTO DIFF WBC: CPT | Performed by: EMERGENCY MEDICINE

## 2025-05-13 PROCEDURE — 85018 HEMOGLOBIN: CPT | Performed by: INTERNAL MEDICINE

## 2025-05-13 RX ORDER — FOLIC ACID 1 MG/1
1000 TABLET ORAL DAILY
Status: DISCONTINUED | OUTPATIENT
Start: 2025-05-13 | End: 2025-05-22 | Stop reason: HOSPADM

## 2025-05-13 RX ORDER — BISACODYL 5 MG/1
5 TABLET, DELAYED RELEASE ORAL DAILY PRN
Status: DISCONTINUED | OUTPATIENT
Start: 2025-05-13 | End: 2025-05-22 | Stop reason: HOSPADM

## 2025-05-13 RX ORDER — LANOLIN ALCOHOL/MO/W.PET/CERES
1000 CREAM (GRAM) TOPICAL DAILY
COMMUNITY

## 2025-05-13 RX ORDER — ONDANSETRON 4 MG/1
4 TABLET, ORALLY DISINTEGRATING ORAL EVERY 6 HOURS PRN
Status: DISCONTINUED | OUTPATIENT
Start: 2025-05-13 | End: 2025-05-22 | Stop reason: HOSPADM

## 2025-05-13 RX ORDER — PANTOPRAZOLE SODIUM 40 MG/10ML
80 INJECTION, POWDER, LYOPHILIZED, FOR SOLUTION INTRAVENOUS ONCE
Status: COMPLETED | OUTPATIENT
Start: 2025-05-13 | End: 2025-05-13

## 2025-05-13 RX ORDER — BISACODYL 10 MG
10 SUPPOSITORY, RECTAL RECTAL DAILY PRN
Status: DISCONTINUED | OUTPATIENT
Start: 2025-05-13 | End: 2025-05-22 | Stop reason: HOSPADM

## 2025-05-13 RX ORDER — SODIUM CHLORIDE 0.9 % (FLUSH) 0.9 %
10 SYRINGE (ML) INJECTION AS NEEDED
Status: DISCONTINUED | OUTPATIENT
Start: 2025-05-13 | End: 2025-05-22 | Stop reason: HOSPADM

## 2025-05-13 RX ORDER — INSULIN LISPRO 100 [IU]/ML
2-7 INJECTION, SOLUTION INTRAVENOUS; SUBCUTANEOUS
Status: DISCONTINUED | OUTPATIENT
Start: 2025-05-13 | End: 2025-05-22 | Stop reason: HOSPADM

## 2025-05-13 RX ORDER — AMOXICILLIN 250 MG
2 CAPSULE ORAL 2 TIMES DAILY PRN
Status: DISCONTINUED | OUTPATIENT
Start: 2025-05-13 | End: 2025-05-22 | Stop reason: HOSPADM

## 2025-05-13 RX ORDER — PANTOPRAZOLE SODIUM 40 MG/1
40 TABLET, DELAYED RELEASE ORAL
Status: DISCONTINUED | OUTPATIENT
Start: 2025-05-14 | End: 2025-05-22 | Stop reason: HOSPADM

## 2025-05-13 RX ORDER — POLYETHYLENE GLYCOL 3350 17 G/17G
17 POWDER, FOR SOLUTION ORAL DAILY PRN
Status: DISCONTINUED | OUTPATIENT
Start: 2025-05-13 | End: 2025-05-22 | Stop reason: HOSPADM

## 2025-05-13 RX ORDER — CARVEDILOL 25 MG/1
25 TABLET ORAL 2 TIMES DAILY WITH MEALS
Status: DISCONTINUED | OUTPATIENT
Start: 2025-05-13 | End: 2025-05-14

## 2025-05-13 RX ORDER — ACETAMINOPHEN 500 MG
1000 TABLET ORAL 2 TIMES DAILY
COMMUNITY

## 2025-05-13 RX ORDER — ONDANSETRON 2 MG/ML
4 INJECTION INTRAMUSCULAR; INTRAVENOUS EVERY 6 HOURS PRN
Status: DISCONTINUED | OUTPATIENT
Start: 2025-05-13 | End: 2025-05-22 | Stop reason: HOSPADM

## 2025-05-13 RX ORDER — ASCORBIC ACID 500 MG
1000 TABLET ORAL DAILY
Status: DISCONTINUED | OUTPATIENT
Start: 2025-05-13 | End: 2025-05-22 | Stop reason: HOSPADM

## 2025-05-13 RX ORDER — MULTIVITAMIN WITH IRON
1000 TABLET ORAL DAILY
Status: DISCONTINUED | OUTPATIENT
Start: 2025-05-13 | End: 2025-05-22 | Stop reason: HOSPADM

## 2025-05-13 RX ORDER — MULTIVIT WITH MINERALS/LUTEIN
1000 TABLET ORAL DAILY
COMMUNITY

## 2025-05-13 RX ADMIN — INSULIN LISPRO 2 UNITS: 100 INJECTION, SOLUTION INTRAVENOUS; SUBCUTANEOUS at 21:05

## 2025-05-13 RX ADMIN — PANTOPRAZOLE SODIUM 8 MG/HR: 40 INJECTION, POWDER, FOR SOLUTION INTRAVENOUS at 10:58

## 2025-05-13 RX ADMIN — PHYTONADIONE 10 MG: 10 INJECTION, EMULSION INTRAMUSCULAR; INTRAVENOUS; SUBCUTANEOUS at 12:33

## 2025-05-13 RX ADMIN — Medication 1000 MCG: at 17:21

## 2025-05-13 RX ADMIN — PANTOPRAZOLE SODIUM 80 MG: 40 INJECTION, POWDER, FOR SOLUTION INTRAVENOUS at 10:57

## 2025-05-13 RX ADMIN — Medication 1082 UNITS: at 12:22

## 2025-05-13 NOTE — H&P
Patient Name:  Sacha Whitehead  YOB: 1930  MRN:  [id number]  Admit Date:  5/13/2025  Patient Care Team:  Alvina Hauser MD as PCP - General (Geriatric Medicine)  John Chavarria MD as Referring Physician (Internal Medicine)  John Medrano MD as Consulting Physician (Hematology and Oncology)  Jhonny Yang MD as Consulting Physician (Urology)  Farrukh Cutler PharmD as Pharmacist (Pharmacy)  Beatriz Snyder RPH as Pharmacist (Pharmacy)      Subjective   History Present Illness     Chief Complaint   Patient presents with   • Abnormal Lab       Mr. Whitehead is a 89 y.o. male with A-fib on warfarin, HTN presenting after he was found to have abnormal labs at outpatient clinic.  Majority of history obtained from patient son at bedside.  Patient was seen by PCP last week due to several weeks to months of ongoing fatigue, weakness, lower extremity swelling.  Labs resulted today and he was found to be anemic and was recommended he come to the ED for evaluation.  Here he was also found to have supratherapeutic INR and heme positive stool.  Per patient's son his dad lives in independently with a roommate and has [facility] come to the house 5 times weekly.  He has otherwise been in his normal state of health, eating and drinking as per usual.  He has had some issues with getting around more recently due to lower extremity edema.  No renee bleeding.  Patient last had INR check about 2 weeks ago which son states was normal.  He has never had issues with this prior.    Review of Systems   Constitutional:  Positive for fatigue. Negative for chills.   Respiratory:  Positive for shortness of breath.    Cardiovascular:  Positive for leg swelling. Negative for chest pain and palpitations.   Gastrointestinal:  Negative for constipation, diarrhea, nausea and vomiting.   Genitourinary:  Negative for dysuria and hematuria.   Neurological:  Positive for weakness. Negative for headaches.        Personal  History     Past Medical History:   Diagnosis Date   • Anemia    • Aneurysm of abdominal aorta     Dr. Red   • Aneurysm of aortic root     Dr. Red   • Angiectasia     Bleeding in stomach; EGD 5/2014   • Aortic valve insufficiency    • Arthritis     Osteoarthritis   • Atrial fibrillation     Permanent, per Dr. Red; on long-term Coumadin anticoagulation since around 2008.   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   • Enlarged prostate without lower urinary tract symptoms (luts)    • Esophageal reflux    • Facial droop     Right sided   • Fatigue    • Gastrointestinal hemorrhage 3/10/2016    Description: with angioectasia stomach 2013   • Gout    • H/O concentric left ventricular hypertrophy (LVH)     LVEF 50% documented on 06/14/2013 by echocardiogram.    • Hx of being hospitalized     On 12/11/2013 with hemoglobin of 6 and heme-positive stool. EGD showed a telangiectasia which was lasered by Dr. Carolina Servin.    • Hx of being hospitalized     On 04/04/2014 for surgical resection of bleeding at the anastomotic site in colon. Had surgical re-resection and discharged home.   • Hx of being hospitalized     On 05/19/2014 with severe anemia.    • Hx of being hospitalized     On 12/15/2014 with further resection of sites of possible blood loss based on angiography of the abdominal masses.    • Hx of being hospitalized     At [facility] with a grand mal seizure and started on Keppra in 03/2015   • Hypertension    • Liver enzyme elevation 2/8/2019   • Melanoma 8/28/2017    dx'd 2017    • Memory loss    • Microalbuminuria    • Mitral regurgitation    • Obstructive hypertrophic cardiomyopathy     Per Dr. Red   • HUMAIRA on CPAP    •  Partial epilepsy with impairment of consciousness 5/13/2015    Overview:  2015 IMO UPDATE  Overview:  2015 IMO UPDATE   • Peptic ulcer     On EGD 2014   • PND (paroxysmal nocturnal dyspnea)    • Reading comprehension disorder    • Renal artery stenosis    • Seizures     [facility]   • Sick sinus syndrome     With hx non-sustained Vtach s/p pacer and AICD   • Stroke     Occipital stroke with vision loss in 07/2014.   • Transient cerebral ischemia     Embolic   • Tubular adenoma of colon     Transverse colon, s/p resection (surgical), cause of severe FE deficiency anemia.   • Ventricular tachycardia    • Vitamin D deficiency      Past Surgical History:   Procedure Laterality Date   • CARDIAC DEFIBRILLATOR PLACEMENT     • CARDIAC ELECTROPHYSIOLOGY PROCEDURE N/A 10/28/2016    Procedure: ICD battery change;  Surgeon: Nael Marcial MD;  Location: [facility] CATH INVASIVE LOCATION;  Service:    • CARDIAC PACEMAKER PLACEMENT      Pacemaker Permanent Placement   • COLON SURGERY      Laparoscopy Partial Colectomy - Right, dx of tubulovillous adenoma   • ENDOSCOPY N/A 8/29/2017    Procedure: ESOPHAGOGASTRODUODENOSCOPY WITH FOREIGN BODY REMOVAL;  Surgeon: Lorena Spencer MD;  Location: [facility] ENDOSCOPY;  Service:    • ENDOSCOPY AND COLONOSCOPY      Showing tumor in the transverse colon.    • ENDOSCOPY AND COLONOSCOPY  07/02/2014    EGD negative; colonoscopy with an anastomotic ulcer that was over sewn and cauterized.    • EYE SURGERY     • TOTAL KNEE ARTHROPLASTY Bilateral 2006     Family History   Problem Relation Age of Onset   • Diabetes Father         Type 2 Diabetes Mellitus   • Breast cancer Sister 59   • Lung cancer Sister    • Diabetes Brother         Type 2 Diabetes Mellitus   • Heart disease Brother    • Heart disease Other    • Stroke Other         Stroke Syndrome   • No Known Problems Son    • No Known Problems Daughter    • Heart disease Mother      Social History     Tobacco Use   •  Smoking status: Former     Current packs/day: 0.00     Average packs/day: 1 pack/day for 10.0 years (10.0 ttl pk-yrs)     Types: Cigarettes     Quit date:      Years since quittin.3   • Smokeless tobacco: Never   • Tobacco comments:     40 YRS AGO   Vaping Use   • Vaping status: Never Used   Substance Use Topics   • Alcohol use: Yes     Comment: 1 drink a year//Caffeine use: None   • Drug use: No     No current facility-administered medications on file prior to encounter.     Current Outpatient Medications on File Prior to Encounter   Medication Sig Dispense Refill   • acetaminophen (TYLENOL) 500 MG tablet Take 2 tablets by mouth 2 (Two) Times a Day.     • ascorbic acid (VITAMIN C) 1000 MG tablet Take 1 tablet by mouth Daily.     • atorvastatin (LIPITOR) 20 MG tablet TAKE 1 TABLET BY MOUTH DAILY *NEEDS APPOINTMENT FOR FURTHER REFILLS (Patient taking differently: Take 1 tablet by mouth Daily.) 30 tablet 0   • carvedilol (COREG) 25 MG tablet TAKE 1 TABLET BY MOUTH 2 TIMES A DAY WITH A MEAL *NEEDS TO BE SEEN FOR REFILLS (Patient taking differently: Take 1 tablet by mouth 2 (Two) Times a Day With Meals.) 60 tablet 0   • folic acid (FOLVITE) 1 MG tablet Take 1 tablet by mouth Daily. PT NEEDS APPT FOR FURTHER REFILLS (Patient taking differently: Take 1 tablet by mouth Daily.) 90 tablet 0   • furosemide (LASIX) 20 MG tablet TAKE 1/2 TABLET EVERY DAY (Patient taking differently: Take 0.5 tablets by mouth Daily.) 45 tablet 3   • metFORMIN (GLUCOPHAGE) 500 MG tablet TAKE 2 TABLETS BY MOUTH EVERY MORNING AND TAKE ONE TABLET BY MOUTH EVERY EVENING (Patient taking differently: Take 1 tablet by mouth 2 (Two) Times a Day With Meals.) 90 tablet 0   • vitamin B-12 (CYANOCOBALAMIN) 1000 MCG tablet Take 1 tablet by mouth Daily.     • warfarin (COUMADIN) 5 MG tablet TAKE ONE-HALF TABLET (2.5 MG) BY MOUTH ON  AND TAKE 1 TABLET (5 MG) EVERY DAY OR AS DIRECTED (Patient taking differently: Take  by mouth See Admin  Instructions. TAKE ONE-HALF TABLET (2.5 MG) BY MOUTH ON TUESDAYS AND TAKE 1 TABLET (5 MG) EVERY DAY OR AS DIRECTED) 90 tablet 3   • [DISCONTINUED] calcium carbonate-cholecalciferol 500-400 MG-UNIT tablet tablet Take 1 tablet by mouth Daily.       No Known Allergies    Objective    Objective     Vital Signs  Temp:  [97 °F (36.1 °C)-97.6 °F (36.4 °C)] 97 °F (36.1 °C)  Heart Rate:  [57-68] 68  Resp:  [17-18] 18  BP: ()/(40-99) 141/72  SpO2:  [91 %-99 %] 99 %  on   ;   Device (Oxygen Therapy): room air  Body mass index is 31.22 kg/m².    Physical Exam  Constitutional:       Appearance: He is ill-appearing.   Cardiovascular:      Rate and Rhythm: Normal rate.      Comments: Paced rhythm  Pulmonary:      Effort: Pulmonary effort is normal. No respiratory distress.      Breath sounds: No stridor.   Abdominal:      General: Abdomen is flat. There is no distension.      Tenderness: There is no abdominal tenderness.   Musculoskeletal:         General: Swelling present.      Right lower leg: Edema present.      Left lower leg: Edema present.   Skin:     Coloration: Skin is pale. Skin is not jaundiced.      Findings: Bruising present.   Neurological:      General: No focal deficit present.      Mental Status: He is alert.         Results Review:    I have personally reviewed:  [x]  Laboratory  [x]  Old records  [x]  Radiology   [x]  EKG/Telemetry   []  Microbiology    [x]  Cardiology/Vascular   []  Pathology     Lab Results (last 24 hours)       Procedure Component Value Units Date/Time    POC Occult Blood Stool [[id number]]  (Abnormal) Collected: 05/13/25 1043    Specimen: Stool Updated: 05/13/25 1045     Fecal Occult Blood Positive     Lot Number [id number]     Expiration Date 8/31/2025     Positive Control Positive     Negative Control Negative    CBC & Differential [[id number]]  (Abnormal) Collected: 05/13/25 1044    Specimen: Blood from Arm, Left Updated: 05/13/25 1106    Narrative:      The following orders were created for  panel order CBC & Differential.  Procedure                               Abnormality         Status                     ---------                               -----------         ------                     CBC Auto Differential[[id number]]        Abnormal            Final result                 Please view results for these tests on the individual orders.    Comprehensive Metabolic Panel [[id number]]  (Abnormal) Collected: 05/13/25 1044    Specimen: Blood from Arm, Left Updated: 05/13/25 1119     Glucose 125 mg/dL      BUN 38 mg/dL      Creatinine 1.24 mg/dL      Sodium 145 mmol/L      Potassium 4.9 mmol/L      Chloride 111 mmol/L      CO2 22.6 mmol/L      Calcium 9.1 mg/dL      Total Protein 5.9 g/dL      Albumin 3.5 g/dL      ALT (SGPT) 20 U/L      AST (SGOT) 21 U/L      Alkaline Phosphatase 108 U/L      Total Bilirubin 0.5 mg/dL      Globulin 2.4 gm/dL      A/G Ratio 1.5 g/dL      BUN/Creatinine Ratio 30.6     Anion Gap 11.4 mmol/L      eGFR 53.5 mL/min/1.73     Narrative:      GFR Categories in Chronic Kidney Disease (CKD)              GFR Category          GFR (mL/min/1.73)    Interpretation  G1                    90 or greater        Normal or high (1)  G2                    60-89                Mild decrease (1)  G3a                   45-59                Mild to moderate decrease  G3b                   30-44                Moderate to severe decrease  G4                    15-29                Severe decrease  G5                    14 or less           Kidney failure    (1)In the absence of evidence of kidney disease, neither GFR category G1 or G2 fulfill the criteria for CKD.    eGFR calculation 2021 CKD-EPI creatinine equation, which does not include race as a factor    Protime-INR [[id number]]  (Abnormal) Collected: 05/13/25 1044    Specimen: Blood from Arm, Left Updated: 05/13/25 1133     Protime 103.7 Seconds      INR >10.00    CBC Auto Differential [[id number]]  (Abnormal) Collected: 05/13/25 1044     Specimen: Blood from Arm, Left Updated: 05/13/25 1106     WBC 4.40 10*3/mm3      RBC 1.73 10*6/mm3      Hemoglobin 5.3 g/dL      Hematocrit 17.6 %      .7 fL      MCH 30.6 pg      MCHC 30.1 g/dL      RDW 14.5 %      RDW-SD 52.9 fl      MPV 10.4 fL      Platelets 138 10*3/mm3      Neutrophil % 68.0 %      Lymphocyte % 14.3 %      Monocyte % 15.7 %      Eosinophil % 1.6 %      Basophil % 0.2 %      Immature Grans % 0.2 %      Neutrophils, Absolute 2.99 10*3/mm3      Lymphocytes, Absolute 0.63 10*3/mm3      Monocytes, Absolute 0.69 10*3/mm3      Eosinophils, Absolute 0.07 10*3/mm3      Basophils, Absolute 0.01 10*3/mm3      Immature Grans, Absolute 0.01 10*3/mm3      nRBC 0.9 /100 WBC     BNP [[id number]]  (Normal) Collected: 05/13/25 1044    Specimen: Blood from Arm, Left Updated: 05/13/25 1119     proBNP 792.0 pg/mL     Narrative:      This assay is used as an aid in the diagnosis of individuals suspected of having heart failure. It can be used as an aid in the diagnosis of acute decompensated heart failure (ADHF) in patients presenting with signs and symptoms of ADHF to the emergency department (ED). In addition, NT-proBNP of <300 pg/mL indicates ADHF is not likely.    Age Range Result Interpretation  NT-proBNP Concentration (pg/mL:      <50             Positive            >450                   Gray                 300-450                    Negative             <300    50-75           Positive            >900                  Gray                300-900                  Negative            <300      >75             Positive            >1800                  Gray                300-1800                  Negative            <300    High Sensitivity Troponin T [[id number]]  (Abnormal) Collected: 05/13/25 1044    Specimen: Blood from Arm, Left Updated: 05/13/25 1120     HS Troponin T 85 ng/L     Narrative:      High Sensitive Troponin T Reference Range:  <14.0 ng/L- Negative Female for AMI  <22.0 ng/L- Negative  Male for AMI  >=14 - Abnormal Female indicating possible myocardial injury.  >=22 - Abnormal Male indicating possible myocardial injury.   Clinicians would have to utilize clinical acumen, EKG, Troponin, and serial changes to determine if it is an Acute Myocardial Infarction or myocardial injury due to an underlying chronic condition.         High Sensitivity Troponin T 1Hr [[id number]]  (Abnormal) Collected: 05/13/25 1156    Specimen: Blood Updated: 05/13/25 1241     HS Troponin T 61 ng/L      Troponin T Numeric Delta -24 ng/L      Troponin T % Delta -28    Narrative:      High Sensitive Troponin T Reference Range:  <14.0 ng/L- Negative Female for AMI  <22.0 ng/L- Negative Male for AMI  >=14 - Abnormal Female indicating possible myocardial injury.  >=22 - Abnormal Male indicating possible myocardial injury.   Clinicians would have to utilize clinical acumen, EKG, Troponin, and serial changes to determine if it is an Acute Myocardial Infarction or myocardial injury due to an underlying chronic condition.         Ferritin [[id number]]  (Abnormal) Collected: 05/13/25 1156    Specimen: Blood Updated: 05/13/25 1337     Ferritin 25.50 ng/mL     Narrative:      Results may be falsely decreased if patient taking Biotin.      Iron Profile [[id number]]  (Abnormal) Collected: 05/13/25 1156    Specimen: Blood Updated: 05/13/25 1335     Iron 26 mcg/dL      Iron Saturation (TSAT) 6 %      Transferrin 287 mg/dL      TIBC 428 mcg/dL             Imaging Results (Last 24 Hours)       Procedure Component Value Units Date/Time    XR Chest 1 View [[id number]] Collected: 05/13/25 1136     Updated: 05/13/25 1139    Narrative:      XR CHEST 1 VW-5/13/2025     HISTORY: Fatigue.     Heart size is mildly enlarged. Lungs appear free of acute infiltrates.  Cardiac pacemaker is seen in good position. There is mild thoracic  scoliosis. There is some aortic calcification.       Impression:      1. Cardiomegaly.  2. No acute infiltrates.        This  report was finalized on 5/13/2025 11:36 AM by Dr. Fredrick Bennett M.D on Workstation: [id number]               Results for orders placed during the hospital encounter of 01/11/18    Adult Transthoracic Echo Complete W/ Cont if Necessary Per Protocol    Interpretation Summary  · Left ventricular wall thickness is consistent with mild concentric hypertrophy.  · Left ventricular systolic function is low normal. Estimated EF = 50%.  · Right ventricular cavity is moderately dilated.  · Left atrial cavity size is moderately dilated.  · Mild to moderate aortic valve regurgitation is present.  · Trace tricuspid valve regurgitation is present.  · Calculated right ventricular systolic pressure from tricuspid regurgitation is 44.8 mmHg.      ECG 12 Lead Dyspnea   Preliminary Result   HEART RATE=62  bpm   RR Rbfivqeh=386  ms   IN Interval=  ms   P Horizontal Axis=  deg   P Front Axis=  deg   QRSD Interval=99  ms   QT Hnstjnuc=387  ms   JYtD=948  ms   QRS Axis=-10  deg   T Wave Axis=-66  deg   - ABNORMAL ECG -   Afib/flut and V-paced complexes   No further rhythm analysis attempted due to paced rhythm   Abnormal R-wave progression, early transition   Repol abnrm, severe global ischemia (LM/MVD)   Date and Time of Study:2025-05-13 10:47:08           Assessment/Plan     Active Hospital Problems    Diagnosis  POA   • **Symptomatic anemia [D64.9]  Yes   • Supratherapeutic INR [R79.1]  Yes   • Warfarin anticoagulation [Z79.01]  Not Applicable   • Sick sinus syndrome [I49.5]  Yes   • Essential hypertension [I10]  Yes   • Atrial fibrillation [I48.91]  Yes      Resolved Hospital Problems   No resolved problems to display.       Mr. Whitehead is a 89 y.o. male with A-fib on warfarin, HTN presenting after he was found to have abnormal labs at outpatient clinic, found to be profoundly anemic and with supratherapeutic INR.    Symptomatic anemia  -Hb  5.3 OA  -heme positive in ED  -GI consulted  -PPI gtt  -2 units PRBC  ordered    Afib  Supratherapeutic INR  S/p PPM  -RC: Coreg- ON HOLD  -AC: wafarin- ON HOLD due to above  -INR > 10, given vitamin K and KCentra in ED    Elevated troponin  Trop 85 > 61; EKG no acute ischemia  - Likely demand related to severe anemia, not consistent with ACS  - Cardiology following    DM2 w/ hyperglycemia  -hold home PO meds  -check A1c  -SSI; monitor for hypoglycemia    I discussed the patient's findings and my recommendations with patient, family, and ED provider.    VTE Prophylaxis - SCDs.  Code Status - DNR.       Farrukh Pedroza MD  [facility]  05/13/25  13:37 EDT

## 2025-05-13 NOTE — ED NOTES
Pt to ed from home via PV    Family reports pt had routine labs drawn last week and his DR called today stating he is very anemic. Pts son reports pt is weaker than normal.

## 2025-05-13 NOTE — CONSULTS
[facility]  Initial Inpatient Consult Note    Referring Provider: Rodo Bobo MD    Reason for Consultation: GI bleeding    Subjective     History of present illness:    89 y.o. male, not previously known to our service that we are asked to see for GI bleeding.  Patient presented with symptomatic anemia.  Hemoglobin noted to be 5.3 and he was directed to the emergency room for admission when checked as an outpatient.  Of note his INR was greater than 10.  BUN is elevated relative to creatinine.  ER reports that his stool was brown but heme positive on rectal examination.    Pt's son is at bedside.  No recent GI complaints.  No new meds or antibx to acct for change in INR.  [facility] for blood [facility] - these have been requested.  No GI complaints.    He is anticoagulated due to permanent A-fib.  Last hemoglobin in our system was 3 years ago.  He does have a chronic anemia which appears macrocytic.    He had a GI bleed in 2014-normal EGD enteroscopy and colonoscopy at that time.  Transferred to [facility] for IR embolization but no bleeding was noted at that time.  He had an EGD in 2017 due to an esophageal foreign body.  It was noted in that report that he has had a prior antrectomy.    Past Medical History:  Past Medical History:   Diagnosis Date    Anemia     Aneurysm of abdominal aorta     Dr. Red    Aneurysm of aortic root     Dr. Red    Angiectasia     Bleeding in stomach; EGD 5/2014    Aortic valve insufficiency     Arthritis     Osteoarthritis    Atrial fibrillation     Permanent, per Dr. Red; on long-term Coumadin anticoagulation since around 2008.    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    Enlarged prostate without lower urinary tract symptoms (luts)     Esophageal reflux     Facial droop     Right sided    Fatigue     Gastrointestinal hemorrhage 3/10/2016    Description: with angioectasia stomach 2013    Gout     H/O concentric left ventricular hypertrophy (LVH)     LVEF 50% documented on 06/14/2013 by echocardiogram.     Hx of being hospitalized     On 12/11/2013 with hemoglobin of 6 and heme-positive stool. EGD showed a telangiectasia which was lasered by Dr. Carolina Servin.     Hx of being hospitalized     On 04/04/2014 for surgical resection of bleeding at the anastomotic site in colon. Had surgical re-resection and discharged home.    Hx of being hospitalized     On 05/19/2014 with severe anemia.     Hx of being hospitalized     On 12/15/2014 with further resection of sites of possible blood loss based on angiography of the abdominal masses.     Hx of being hospitalized     At [facility] with a grand mal seizure and started on Keppra in 03/2015    Hypertension     Liver enzyme elevation 2/8/2019    Melanoma 8/28/2017    dx'd 2017     Memory loss     Microalbuminuria     Mitral regurgitation     Obstructive hypertrophic cardiomyopathy     Per Dr. Red    HUMAIRA on CPAP     Partial epilepsy with impairment of consciousness 5/13/2015    Overview:  2015 IMO UPDATE  Overview:  2015 IMO UPDATE    Peptic ulcer     On EGD 2014    PND (paroxysmal nocturnal dyspnea)     Reading comprehension disorder     Renal artery stenosis     Seizures     Strarted 3/2015; admitted [facility] [place]    Sick sinus syndrome     With hx non-sustained Vtach s/p pacer and AICD    Stroke     Occipital stroke with vision loss in 07/2014.    Transient cerebral ischemia     Embolic    Tubular adenoma of colon     Transverse colon, s/p resection (surgical), cause of severe FE deficiency anemia.    Ventricular tachycardia     Vitamin D deficiency      Past Surgical History:  Past Surgical  History:   Procedure Laterality Date    CARDIAC DEFIBRILLATOR PLACEMENT      CARDIAC ELECTROPHYSIOLOGY PROCEDURE N/A 10/28/2016    Procedure: ICD battery change;  Surgeon: Nael Marcial MD;  Location: [facility] CATH INVASIVE LOCATION;  Service:     CARDIAC PACEMAKER PLACEMENT      Pacemaker Permanent Placement    COLON SURGERY      Laparoscopy Partial Colectomy - Right, dx of tubulovillous adenoma    ENDOSCOPY N/A 2017    Procedure: ESOPHAGOGASTRODUODENOSCOPY WITH FOREIGN BODY REMOVAL;  Surgeon: Lorena Spencer MD;  Location: [facility] ENDOSCOPY;  Service:     ENDOSCOPY AND COLONOSCOPY      Showing tumor in the transverse colon.     ENDOSCOPY AND COLONOSCOPY  2014    EGD negative; colonoscopy with an anastomotic ulcer that was over sewn and cauterized.     EYE SURGERY      TOTAL KNEE ARTHROPLASTY Bilateral       Social History:   Social History     Tobacco Use    Smoking status: Former     Current packs/day: 0.00     Average packs/day: 1 pack/day for 10.0 years (10.0 ttl pk-yrs)     Types: Cigarettes     Quit date:      Years since quittin.3    Smokeless tobacco: Never    Tobacco comments:     40 YRS AGO   Substance Use Topics    Alcohol use: Yes     Comment: 1 drink a year//Caffeine use: None      Family History:  Family History   Problem Relation Age of Onset    Diabetes Father         Type 2 Diabetes Mellitus    Breast cancer Sister 59    Lung cancer Sister     Diabetes Brother         Type 2 Diabetes Mellitus    Heart disease Brother     Heart disease Other     Stroke Other         Stroke Syndrome    No Known Problems Son     No Known Problems Daughter     Heart disease Mother        Home Meds:  Medications Prior to Admission   Medication Sig Dispense Refill Last Dose/Taking    acetaminophen (TYLENOL) 500 MG tablet Take 2 tablets by mouth 2 (Two) Times a Day.   Taking    ascorbic acid (VITAMIN C) 1000 MG tablet Take 1 tablet by mouth Daily.   Taking    atorvastatin (LIPITOR) 20 MG tablet TAKE  1 TABLET BY MOUTH DAILY *NEEDS APPOINTMENT FOR FURTHER REFILLS (Patient taking differently: Take 1 tablet by mouth Daily.) 30 tablet 0 Taking Differently    carvedilol (COREG) 25 MG tablet TAKE 1 TABLET BY MOUTH 2 TIMES A DAY WITH A MEAL *NEEDS TO BE SEEN FOR REFILLS (Patient taking differently: Take 1 tablet by mouth 2 (Two) Times a Day With Meals.) 60 tablet 0 Taking Differently    folic acid (FOLVITE) 1 MG tablet Take 1 tablet by mouth Daily. PT NEEDS APPT FOR FURTHER REFILLS (Patient taking differently: Take 1 tablet by mouth Daily.) 90 tablet 0 Taking Differently    furosemide (LASIX) 20 MG tablet TAKE 1/2 TABLET EVERY DAY (Patient taking differently: Take 0.5 tablets by mouth Daily.) 45 tablet 3 Taking Differently    metFORMIN (GLUCOPHAGE) 500 MG tablet TAKE 2 TABLETS BY MOUTH EVERY MORNING AND TAKE ONE TABLET BY MOUTH EVERY EVENING (Patient taking differently: Take 1 tablet by mouth 2 (Two) Times a Day With Meals.) 90 tablet 0 Taking Differently    vitamin B-12 (CYANOCOBALAMIN) 1000 MCG tablet Take 1 tablet by mouth Daily.   Taking    warfarin (COUMADIN) 5 MG tablet TAKE ONE-HALF TABLET (2.5 MG) BY MOUTH ON TUESDAYS AND TAKE 1 TABLET (5 MG) EVERY DAY OR AS DIRECTED (Patient taking differently: Take  by mouth See Admin Instructions. TAKE ONE-HALF TABLET (2.5 MG) BY MOUTH ON TUESDAYS AND TAKE 1 TABLET (5 MG) EVERY DAY OR AS DIRECTED) 90 tablet 3 Taking Differently     Current Meds:   ascorbic acid, 1,000 mg, Oral, Daily  [Held by provider] carvedilol, 25 mg, Oral, BID With Meals  folic acid, 1,000 mcg, Oral, Daily  insulin lispro, 2-7 Units, Subcutaneous, 4x Daily AC & at Bedtime  vitamin B-12, 1,000 mcg, Oral, Daily      Allergies:  No Known Allergies  Review of Systems  Pertinent items are noted in HPI     Objective     Vital Signs  Temp:  [97 °F (36.1 °C)-97.6 °F (36.4 °C)] 97 °F (36.1 °C)  Heart Rate:  [57-68] 59  Resp:  [17-18] 17  BP: ()/(40-99) 129/94  Physical Exam:  General Appearance:    Alert,  cooperative, in no acute distress, very [place]   Head:    Normocephalic, without obvious abnormality, atraumatic   Eyes:          conjunctivae and sclerae normal, no   icterus   Throat:   no thrush, oral mucosa moist   Neck:   Supple, no adenopathy   Lungs:     Clear to auscultation bilaterally    Heart:    Regular rhythm and normal rate    Chest Wall:    No abnormalities observed   Abdomen:     Soft, nondistended, nontender; normal bowel sounds   Extremities:   1+ bilat le edema, no redness   Skin:   No bruising or rash   Psychiatric:  normal mood and insight     Results Review:   I reviewed the patient's new clinical results.    Results from last 7 days   Lab Units 05/13/25  1044   WBC 10*3/mm3 4.40   HEMOGLOBIN g/dL 5.3*   HEMATOCRIT % 17.6*   PLATELETS 10*3/mm3 138*     Results from last 7 days   Lab Units 05/13/25  1044   SODIUM mmol/L 145   POTASSIUM mmol/L 4.9   CHLORIDE mmol/L 111*   CO2 mmol/L 22.6   BUN mg/dL 38*   CREATININE mg/dL 1.24   CALCIUM mg/dL 9.1   BILIRUBIN mg/dL 0.5   ALK PHOS U/L 108   ALT (SGPT) U/L 20   AST (SGOT) U/L 21   GLUCOSE mg/dL 125*     Results from last 7 days   Lab Units 05/13/25  1044   INR  >10.00*     Lab Results   Lab Value Date/Time    LIPASE 41 10/12/2018 2021    LIPASE 39 10/12/2018 1504    LIPASE 21 02/09/2015 1638    LIPASE 22 06/30/2014 1816       Radiology:  XR Chest 1 View   Final Result   1. Cardiomegaly.   2. No acute infiltrates.           This report was finalized on 5/13/2025 11:36 AM by Dr. Fredrick Bennett M.D on Workstation: [id number]              Assessment & Plan   Active Hospital Problems    Diagnosis     **Symptomatic anemia     Supratherapeutic INR     Warfarin anticoagulation     Sick sinus syndrome     Essential hypertension     Atrial fibrillation        Assessment:  Symptomatic anemia  Coumadin toxicity  Heme positive stool in the setting of Coumadin toxicity  History of peptic ulcer disease    Plan:  Discussed options including conservative management  vs egd vs bidirectional endoscopy with pt's son.  They prefer a more conservative approach for now.  There is no evidence of ongoing clinically significant bleeding.  No melena on exam.  Recommend transfusion, coagulopathy correction  Will start pantoprazole daily in the event there is some UGI inflammatory change contributing to anemia  Requested recent labs from [facility] for diet  Will follow      I discussed the patients findings and my recommendations with patient, family, and nursing staff.          Snow Lopez M.D.  [facility]  [phone]

## 2025-05-13 NOTE — ED NOTES
Nursing report ED to floor  Sacha Whitehead  89 y.o.  male    HPI :  HPI  Stated Reason for Visit: abnormal labs  History Obtained From: patient    Chief Complaint  Chief Complaint   Patient presents with    Abnormal Lab       Admitting doctor:   Farrukh Pedroza MD    Admitting diagnosis:   The primary encounter diagnosis was Symptomatic anemia. Diagnoses of Gastrointestinal hemorrhage, unspecified gastrointestinal hemorrhage type and Toxic effect of warfarin, unintentional, initial encounter were also pertinent to this visit.    Code status:   Current Code Status       Date Active Code Status Order ID Comments User Context       5/13/2025 1221 CPR (Attempt to Resuscitate) [id number]  Farrukh Pedroza MD ED        Question Answer    Code Status (Patient has no pulse and is not breathing) CPR (Attempt to Resuscitate)    Medical Interventions (Patient has pulse or is breathing) Full    Level Of Support Discussed With Patient                    Allergies:   Patient has no known allergies.    Isolation:   No active isolations    Intake and Output  No intake or output data in the 24 hours ending 05/13/25 1257    Weight:       05/13/25  1030   Weight: 98.7 kg (217 lb 9.5 oz)       Most recent vitals:   Vitals:    05/13/25 1214 05/13/25 1216 05/13/25 1231 05/13/25 1247   BP: 113/54 112/65 111/99 131/69   BP Location:       Patient Position:       Pulse:  60 68 60   Resp:       Temp: 97 °F (36.1 °C)      TempSrc: Axillary      SpO2: 94% 97%  94%   Weight:       Height:           Active LDAs/IV Access:   Lines, Drains & Airways       Active LDAs       Name Placement date Placement time Site Days    Peripheral IV 02/18/19 1511 Left Antecubital 02/18/19  1511  Antecubital  2275    Peripheral IV 05/13/25 1043 20 G Left Antecubital 05/13/25  1043  Antecubital  less than 1    Peripheral IV 05/13/25 1211 20 G Right Antecubital 05/13/25  1211  Antecubital  less than 1                    Labs (abnormal labs have a star):    Labs Reviewed   COMPREHENSIVE METABOLIC PANEL - Abnormal; Notable for the following components:       Result Value    Glucose 125 (*)     BUN 38 (*)     Chloride 111 (*)     Total Protein 5.9 (*)     BUN/Creatinine Ratio 30.6 (*)     eGFR 53.5 (*)     All other components within normal limits    Narrative:     GFR Categories in Chronic Kidney Disease (CKD)              GFR Category          GFR (mL/min/1.73)    Interpretation  G1                    90 or greater        Normal or high (1)  G2                    60-89                Mild decrease (1)  G3a                   45-59                Mild to moderate decrease  G3b                   30-44                Moderate to severe decrease  G4                    15-29                Severe decrease  G5                    14 or less           Kidney failure    (1)In the absence of evidence of kidney disease, neither GFR category G1 or G2 fulfill the criteria for CKD.    eGFR calculation 2021 CKD-EPI creatinine equation, which does not include race as a factor   PROTIME-INR - Abnormal; Notable for the following components:    Protime 103.7 (*)     INR >10.00 (*)     All other components within normal limits   CBC WITH AUTO DIFFERENTIAL - Abnormal; Notable for the following components:    RBC 1.73 (*)     Hemoglobin 5.3 (*)     Hematocrit 17.6 (*)     .7 (*)     MCHC 30.1 (*)     Platelets 138 (*)     Lymphocyte % 14.3 (*)     Monocyte % 15.7 (*)     Lymphocytes, Absolute 0.63 (*)     nRBC 0.9 (*)     All other components within normal limits   TROPONIN - Abnormal; Notable for the following components:    HS Troponin T 85 (*)     All other components within normal limits    Narrative:     High Sensitive Troponin T Reference Range:  <14.0 ng/L- Negative Female for AMI  <22.0 ng/L- Negative Male for AMI  >=14 - Abnormal Female indicating possible myocardial injury.  >=22 - Abnormal Male indicating possible myocardial injury.   Clinicians would have to utilize  clinical acumen, EKG, Troponin, and serial changes to determine if it is an Acute Myocardial Infarction or myocardial injury due to an underlying chronic condition.        HIGH SENSITIVITIY TROPONIN T 1HR - Abnormal; Notable for the following components:    HS Troponin T 61 (*)     All other components within normal limits    Narrative:     High Sensitive Troponin T Reference Range:  <14.0 ng/L- Negative Female for AMI  <22.0 ng/L- Negative Male for AMI  >=14 - Abnormal Female indicating possible myocardial injury.  >=22 - Abnormal Male indicating possible myocardial injury.   Clinicians would have to utilize clinical acumen, EKG, Troponin, and serial changes to determine if it is an Acute Myocardial Infarction or myocardial injury due to an underlying chronic condition.        POCT OCCULT BLOOD STOOL (ED ONLY) - Abnormal; Notable for the following components:    Fecal Occult Blood Positive (*)     All other components within normal limits   BNP (IN-HOUSE) - Normal    Narrative:     This assay is used as an aid in the diagnosis of individuals suspected of having heart failure. It can be used as an aid in the diagnosis of acute decompensated heart failure (ADHF) in patients presenting with signs and symptoms of ADHF to the emergency department (ED). In addition, NT-proBNP of <300 pg/mL indicates ADHF is not likely.    Age Range Result Interpretation  NT-proBNP Concentration (pg/mL:      <50             Positive            >450                   Gray                 300-450                    Negative             <300    50-75           Positive            >900                  Gray                300-900                  Negative            <300      >75             Positive            >1800                  Gray                300-1800                  Negative            <300   FERRITIN   IRON PROFILE   TYPE AND SCREEN   PREPARE RBC   CBC AND DIFFERENTIAL    Narrative:     The following orders were created for  panel order CBC & Differential.  Procedure                               Abnormality         Status                     ---------                               -----------         ------                     CBC Auto Differential[[id number]]        Abnormal            Final result                 Please view results for these tests on the individual orders.       EKG:   ECG 12 Lead Dyspnea   Preliminary Result   HEART RATE=62  bpm   RR Rfxrnkcf=377  ms   RI Interval=  ms   P Horizontal Axis=  deg   P Front Axis=  deg   QRSD Interval=99  ms   QT Zmlphhnr=091  ms   WBcQ=290  ms   QRS Axis=-10  deg   T Wave Axis=-66  deg   - ABNORMAL ECG -   Afib/flut and V-paced complexes   No further rhythm analysis attempted due to paced rhythm   Abnormal R-wave progression, early transition   Repol abnrm, severe global ischemia (LM/MVD)   Date and Time of Study:2025-05-13 10:47:08          Meds given in ED:   Medications   sodium chloride 0.9 % flush 10 mL (has no administration in time range)   pantoprazole (PROTONIX) injection 80 mg (0 mg Intravenous Hold 5/13/25 1234)     And   pantoprazole (PROTONIX) 40 mg in sodium chloride 0.9 % 100 mL (0.4 mg/mL) MBP (8 mg/hr Intravenous New Bag 5/13/25 1058)   phytonadione (AQUA-MEPHYTON, VITAMIN K) 10 mg in sodium chloride 0.9 % 50 mL IVPB (10 mg Intravenous New Bag 5/13/25 1233)   ondansetron ODT (ZOFRAN-ODT) disintegrating tablet 4 mg (has no administration in time range)     Or   ondansetron (ZOFRAN) injection 4 mg (has no administration in time range)   sennosides-docusate (PERICOLACE) 8.6-50 MG per tablet 2 tablet (has no administration in time range)     And   polyethylene glycol (MIRALAX) packet 17 g (has no administration in time range)     And   bisacodyl (DULCOLAX) EC tablet 5 mg (has no administration in time range)     And   bisacodyl (DULCOLAX) suppository 10 mg (has no administration in time range)   prothrombin complex conc human (KCentra) IV solution 1,082 Units (1,082 Units  Intravenous Given 25 1222)       Imaging results:  XR Chest 1 View  Result Date: 2025  1. Cardiomegaly. 2. No acute infiltrates.   This report was finalized on 2025 11:36 AM by Dr. Fredrick Bennett M.D on Workstation: [id number]        Ambulatory status:   - assist    Social issues:   Social History     Socioeconomic History    Marital status:      Spouse name: Leslie    Number of children: 4    Years of education: College   Tobacco Use    Smoking status: Former     Current packs/day: 0.00     Average packs/day: 1 pack/day for 10.0 years (10.0 ttl pk-yrs)     Types: Cigarettes     Quit date:      Years since quittin.3    Smokeless tobacco: Never    Tobacco comments:     40 YRS AGO   Vaping Use    Vaping status: Never Used   Substance and Sexual Activity    Alcohol use: Yes     Comment: 1 drink a year//Caffeine use: None    Drug use: No    Sexual activity: Not Currently       Peripheral Neurovascular  Peripheral Neurovascular (Adult)  Peripheral Neurovascular WDL: .WDL except, all  Capillary Refill, General: greater than 3 secs  LUE Neurovascular Assessment  Temperature LUE: cool  Color LUE: dusky, cyanotic  Sensation LUE: no numbness, no tenderness, no tingling  RUE Neurovascular Assessment  Temperature RUE: cool  Color RUE: cyanotic, dusky  Sensation RUE: no numbness, no tenderness, no tingling    Neuro Cognitive  Neuro Cognitive (Adult)  Cognitive/Neuro/Behavioral WDL: WDL    Learning  Learning Assessment  Learning Readiness and Ability: no barriers identified    Respiratory  Respiratory WDL  Respiratory WDL: WDL    Abdominal Pain       Pain Assessments  Pain (Adult)  (0-10) Pain Rating: Rest: 0  (0-10) Pain Rating: Activity: 0    NIH Stroke Scale       Andrew Sauceda RN  25 12:57 EDT

## 2025-05-13 NOTE — PROGRESS NOTES
Clinical Pharmacy Services: Medication History    Sacha Whitehead is a 89 y.o. male presenting to [facility] for   Chief Complaint   Patient presents with    Abnormal Lab       He  has a past medical history of Anemia, Aneurysm of abdominal aorta, Aneurysm of aortic root, Angiectasia, Aortic valve insufficiency, Arthritis, Atrial fibrillation, Benign paroxysmal positional vertigo (3/10/2016), BPPV (benign paroxysmal positional vertigo), CAD in native artery, Carcinoid tumor of gastrointestinal tract (8/22/2016), Colon cancer, Confusion, Deafness, Diabetes mellitus, Enlarged prostate without lower urinary tract symptoms (luts), Esophageal reflux, Facial droop, Fatigue, Gastrointestinal hemorrhage (3/10/2016), Gout, H/O concentric left ventricular hypertrophy (LVH), being hospitalized, being hospitalized, being hospitalized, being hospitalized, being hospitalized, Hypertension, Liver enzyme elevation (2/8/2019), Melanoma (8/28/2017), Memory loss, Microalbuminuria, Mitral regurgitation, Obstructive hypertrophic cardiomyopathy, HUMAIRA on CPAP, Partial epilepsy with impairment of consciousness (5/13/2015), Peptic ulcer, PND (paroxysmal nocturnal dyspnea), Reading comprehension disorder, Renal artery stenosis, Seizures, Sick sinus syndrome, Stroke, Transient cerebral ischemia, Tubular adenoma of colon, Ventricular tachycardia, and Vitamin D deficiency.    Allergies as of 05/13/2025    (No Known Allergies)       Medication information was obtained from: Family Member   Pharmacy and Phone Number:     Prior to Admission Medications       Prescriptions Last Dose Informant Patient Reported? Taking?    acetaminophen (TYLENOL) 500 MG tablet  Family Member Yes Yes    Take 2 tablets by mouth 2 (Two) Times a Day.    ascorbic acid (VITAMIN C) 1000 MG tablet  Family Member Yes Yes    Take 1 tablet by mouth Daily.    atorvastatin (LIPITOR) 20 MG tablet  Pharmacy No Yes    TAKE 1 TABLET BY MOUTH DAILY *NEEDS APPOINTMENT FOR  FURTHER REFILLS    Patient taking differently:  Take 1 tablet by mouth Daily.    carvedilol (COREG) 25 MG tablet  Pharmacy No Yes    TAKE 1 TABLET BY MOUTH 2 TIMES A DAY WITH A MEAL *NEEDS TO BE SEEN FOR REFILLS    Patient taking differently:  Take 1 tablet by mouth 2 (Two) Times a Day With Meals.    folic acid (FOLVITE) 1 MG tablet  Family Member No Yes    Take 1 tablet by mouth Daily. PT NEEDS APPT FOR FURTHER REFILLS    Patient taking differently:  Take 1 tablet by mouth Daily.    furosemide (LASIX) 20 MG tablet  Pharmacy No Yes    TAKE 1/2 TABLET EVERY DAY    Patient taking differently:  Take 0.5 tablets by mouth Daily.    metFORMIN (GLUCOPHAGE) 500 MG tablet  Pharmacy, Family Member No Yes    TAKE 2 TABLETS BY MOUTH EVERY MORNING AND TAKE ONE TABLET BY MOUTH EVERY EVENING    Patient taking differently:  Take 1 tablet by mouth 2 (Two) Times a Day With Meals.    vitamin B-12 (CYANOCOBALAMIN) 1000 MCG tablet  Family Member Yes Yes    Take 1 tablet by mouth Daily.    warfarin (COUMADIN) 5 MG tablet  Pharmacy No Yes    TAKE ONE-HALF TABLET (2.5 MG) BY MOUTH ON TUESDAYS AND TAKE 1 TABLET (5 MG) EVERY DAY OR AS DIRECTED    Patient taking differently:  Take  by mouth See Admin Instructions. TAKE ONE-HALF TABLET (2.5 MG) BY MOUTH ON TUESDAYS AND TAKE 1 TABLET (5 MG) EVERY DAY OR AS DIRECTED              Medication notes:     This medication list is complete to the best of my knowledge as of 5/13/2025    Please call if questions.    Javon Eagle  Medication History Technician  [phone]    5/13/2025 10:56 EDT

## 2025-05-13 NOTE — CONSULTS
[facility]        Patient Name: Sacha Whitehead  Age/Sex: 89 y.o. male  : 1930  MRN: [id number]    Date of Admission: 2025  Date of Encounter Visit: 25  Encounter Provider: Phan Morin MD  Referring Provider: No ref. provider found  Place of Service: [facility] CARDIOLOGY  Patient Care Team:  Alvina Hauser MD as PCP - General (Geriatric Medicine)  John Chavarria MD as Referring Physician (Internal Medicine)  John Medrano MD as Consulting Physician (Hematology and Oncology)  Jhonny Yang MD as Consulting Physician (Urology)  Farrukh Cutler PharmD as Pharmacist (Pharmacy)  Beatriz Snyder RPH as Pharmacist (Pharmacy)    Subjective:     Chief Complaint: generalized weakness, abnormal labs    Reason for consult: elevated troponin     History of Present Illness:  Sacha Whitehead is a 89 y.o. male who presents for further evaluation of anemia. He had labs drawn at his primary care office one week ago and they notified him that he was anemic and recommended he go to the ER for further workup. He is on warfarin for a fib and INR today is >10.  He was given Kcentra and IV vitamin K.  Hemoglobin is 5.3, he denies any known blood loss, CP, SOA, N/V/D. Only complaints are weakness, back pan from a fall two months ago, and low extremity edema. He has been started on IV protonix drip, had a one time dose of KCentra, vitamin K, and one unit PRBC. Troponin is 85/61, . He is being admittied to [facility] with cardiology and GI consulting. He has an AICD, generator was changed in .     He otherwise is status quo from the cardiac standpoint.  He is been on Coumadin for years.  His son is present at bedside.    Prior Cardiac Testing:    Echocardiogram 2018    Left ventricular wall thickness is consistent with mild concentric hypertrophy.  Left ventricular systolic function is low normal. Estimated EF = 50%.  Right ventricular cavity is  moderately dilated.  Left atrial cavity size is moderately dilated.  Mild to moderate aortic valve regurgitation is present.  Trace tricuspid valve regurgitation is present.  Calculated right ventricular systolic pressure from tricuspid regurgitation is 44.8 mmHg.         Past Medical History:  Past Medical History:   Diagnosis Date    Anemia     Aneurysm of abdominal aorta     Dr. Red    Aneurysm of aortic root     Dr. Red    Angiectserg     Bleeding in stomach; EGD 5/2014    Aortic valve insufficiency     Arthritis     Osteoarthritis    Atrial fibrillation     Permanent, per Dr. Red; on long-term Coumadin anticoagulation since around 2008.    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    Enlarged prostate without lower urinary tract symptoms (luts)     Esophageal reflux     Facial droop     Right sided    Fatigue     Gastrointestinal hemorrhage 3/10/2016    Description: with angioectasia stomach 2013    Gout     H/O concentric left ventricular hypertrophy (LVH)     LVEF 50% documented on 06/14/2013 by echocardiogram.     Hx of being hospitalized     On 12/11/2013 with hemoglobin of 6 and heme-positive stool. EGD showed a telangiectasia which was lasered by Dr. Carolina Servin.     Hx of being hospitalized     On 04/04/2014 for surgical resection of bleeding at the anastomotic site in colon. Had surgical re-resection and discharged home.    Hx of being hospitalized     On 05/19/2014 with severe anemia.     Hx of being hospitalized     On 12/15/2014 with further resection of sites of possible blood loss based on angiography of the abdominal masses.     Hx of being hospitalized     At [facility] with a grand mal seizure and started on  Keppra in 03/2015    Hypertension     Liver enzyme elevation 2/8/2019    Melanoma 8/28/2017    dx'd 2017     Memory loss     Microalbuminuria     Mitral regurgitation     Obstructive hypertrophic cardiomyopathy     Per Dr. Red    HUMAIRA on CPAP     Partial epilepsy with impairment of consciousness 5/13/2015    Overview:  2015 IMO UPDATE  Overview:  2015 IMO UPDATE    Peptic ulcer     On EGD 2014    PND (paroxysmal nocturnal dyspnea)     Reading comprehension disorder     Renal artery stenosis     Seizures     Strarted 3/2015; admitted inpt [place]    Sick sinus syndrome     With hx non-sustained Vtach s/p pacer and AICD    Stroke     Occipital stroke with vision loss in 07/2014.    Transient cerebral ischemia     Embolic    Tubular adenoma of colon     Transverse colon, s/p resection (surgical), cause of severe FE deficiency anemia.    Ventricular tachycardia     Vitamin D deficiency        Past Surgical History:   Procedure Laterality Date    CARDIAC DEFIBRILLATOR PLACEMENT      CARDIAC ELECTROPHYSIOLOGY PROCEDURE N/A 10/28/2016    Procedure: ICD battery change;  Surgeon: Nael Marcial MD;  Location: [facility] CATH INVASIVE LOCATION;  Service:     CARDIAC PACEMAKER PLACEMENT      Pacemaker Permanent Placement    COLON SURGERY      Laparoscopy Partial Colectomy - Right, dx of tubulovillous adenoma    ENDOSCOPY N/A 8/29/2017    Procedure: ESOPHAGOGASTRODUODENOSCOPY WITH FOREIGN BODY REMOVAL;  Surgeon: Lorena Spencer MD;  Location: [facility] ENDOSCOPY;  Service:     ENDOSCOPY AND COLONOSCOPY      Showing tumor in the transverse colon.     ENDOSCOPY AND COLONOSCOPY  07/02/2014    EGD negative; colonoscopy with an anastomotic ulcer that was over sewn and cauterized.     EYE SURGERY      TOTAL KNEE ARTHROPLASTY Bilateral 2006       Home Medications:   Medications Prior to Admission   Medication Sig Dispense Refill Last Dose/Taking    acetaminophen (TYLENOL) 500 MG tablet Take 2 tablets by mouth 2 (Two) Times a Day.   5/13/2025     ascorbic acid (VITAMIN C) 1000 MG tablet Take 1 tablet by mouth Daily.   5/13/2025    atorvastatin (LIPITOR) 20 MG tablet TAKE 1 TABLET BY MOUTH DAILY *NEEDS APPOINTMENT FOR FURTHER REFILLS (Patient taking differently: Take 1 tablet by mouth Daily.) 30 tablet 0 5/12/2025    carvedilol (COREG) 25 MG tablet TAKE 1 TABLET BY MOUTH 2 TIMES A DAY WITH A MEAL *NEEDS TO BE SEEN FOR REFILLS (Patient taking differently: Take 1 tablet by mouth 2 (Two) Times a Day With Meals.) 60 tablet 0 5/13/2025    folic acid (FOLVITE) 1 MG tablet Take 1 tablet by mouth Daily. PT NEEDS APPT FOR FURTHER REFILLS (Patient taking differently: Take 1 tablet by mouth Daily.) 90 tablet 0 5/13/2025    furosemide (LASIX) 20 MG tablet TAKE 1/2 TABLET EVERY DAY (Patient taking differently: Take 0.5 tablets by mouth Daily.) 45 tablet 3 5/13/2025    metFORMIN (GLUCOPHAGE) 500 MG tablet TAKE 2 TABLETS BY MOUTH EVERY MORNING AND TAKE ONE TABLET BY MOUTH EVERY EVENING (Patient taking differently: Take 1 tablet by mouth 2 (Two) Times a Day With Meals. TAKE ONE TABLET BY MOUTH EVERY MORNING AND TAKE ONE TABLET BY MOUTH EVERY EVENING) 90 tablet 0 5/13/2025    vitamin B-12 (CYANOCOBALAMIN) 1000 MCG tablet Take 1 tablet by mouth Daily.   5/12/2025    warfarin (COUMADIN) 5 MG tablet TAKE ONE-HALF TABLET (2.5 MG) BY MOUTH ON TUESDAYS AND TAKE 1 TABLET (5 MG) EVERY DAY OR AS DIRECTED (Patient taking differently: Take  by mouth See Admin Instructions. TAKE ONE-HALF TABLET (2.5 MG) BY MOUTH ON TUESDAYS AND TAKE 1 TABLET (5 MG) EVERY DAY OR AS DIRECTED) 90 tablet 3 5/12/2025       Allergies:  No Known Allergies    Past Social History:  Social History     Socioeconomic History    Marital status:      Spouse name: Leslie    Number of children: 4    Years of education: College   Tobacco Use    Smoking status: Former     Current packs/day: 0.00     Average packs/day: 1 pack/day for 10.0 years (10.0 ttl pk-yrs)     Types: Cigarettes     Quit date: 1984     Years  since quittin.3    Smokeless tobacco: Never    Tobacco comments:     40 YRS AGO   Vaping Use    Vaping status: Never Used   Substance and Sexual Activity    Alcohol use: Yes     Comment: 1 drink a year//Caffeine use: None    Drug use: No    Sexual activity: Not Currently       Past Family History:  Family History   Problem Relation Age of Onset    Diabetes Father         Type 2 Diabetes Mellitus    Breast cancer Sister 59    Lung cancer Sister     Diabetes Brother         Type 2 Diabetes Mellitus    Heart disease Brother     Heart disease Other     Stroke Other         Stroke Syndrome    No Known Problems Son     No Known Problems Daughter     Heart disease Mother        REVIEW OF SYSTEMS:   14 point ROS was performed and is negative except as outlined in HPI          Objective:   Temp:  [97 °F (36.1 °C)-97.6 °F (36.4 °C)] 97.3 °F (36.3 °C)  Heart Rate:  [57-72] 72  Resp:  [17-18] 18  BP: ()/(40-99) 104/60     Intake/Output Summary (Last 24 hours) at 2025 1841  Last data filed at 2025 1400  Gross per 24 hour   Intake 350 ml   Output --   Net 350 ml     Body mass index is 31.22 kg/m².      25  1030   Weight: 98.7 kg (217 lb 9.5 oz)     Weight change:     General Appearance:    Alert, cooperative, in no acute distress.  Significant pallor noted..  Very hard of hearing   Throat:   oral mucosa moist   Neck:   supple, trachea midline, no thyromegaly, no carotid bruit, no significant JVD   Lungs:     Clear to auscultation,respirations regular, even and unlabored.    Heart:    R irregularly irregular, normal S1 and S2, no murmur, no gallop, no rub, no click   Chest Wall:    No abnormalities observed   Abdomen:     nondistended, no guarding, no rebound  tenderness   Extremities:   Moves all extremities well,  no significant edema, no cyanosis, no redness   Pulses:   Pulses palpable and equal bilaterally. Normal radial, carotid, femoral, dorsalis pedis and posterior tibial pulses bilaterally.   "  Skin:  Psychiatric:   No bleeding, bruising or rash    Alert and oriented x 3, normal mood and affect     Lab Review:   Results from last 7 days   Lab Units 05/13/25  1044   SODIUM mmol/L 145   POTASSIUM mmol/L 4.9   CHLORIDE mmol/L 111*   CO2 mmol/L 22.6   BUN mg/dL 38*   CREATININE mg/dL 1.24   GLUCOSE mg/dL 125*   CALCIUM mg/dL 9.1   AST (SGOT) U/L 21   ALT (SGPT) U/L 20     Results from last 7 days   Lab Units 05/13/25  1156 05/13/25  1044   HSTROP T ng/L 61* 85*     Results from last 7 days   Lab Units 05/13/25  1625 05/13/25  1044   WBC 10*3/mm3  --  4.40   HEMOGLOBIN g/dL 6.0* 5.3*   HEMATOCRIT % 18.9* 17.6*   PLATELETS 10*3/mm3  --  138*     Results from last 7 days   Lab Units 05/13/25  1044   INR  >10.00*               Invalid input(s): \"LDLCALC\"  Results from last 7 days   Lab Units 05/13/25  1044   PROBNP pg/mL 792.0           Echo EF Estimated  Lab Results   Component Value Date    ECHOEFEST 50 01/11/2018       EKG:   I personally viewed and interpreted the patient's EKG.  It shows atrial fibrillation with intermittent ventricular pacing.  Unchanged from prior.  Irregularly irregular              Imaging:  Imaging Results (Most Recent)       Procedure Component Value Units Date/Time    XR Chest 1 View [[id number]] Collected: 05/13/25 1136     Updated: 05/13/25 1139    Narrative:      XR CHEST 1 VW-5/13/2025     HISTORY: Fatigue.     Heart size is mildly enlarged. Lungs appear free of acute infiltrates.  Cardiac pacemaker is seen in good position. There is mild thoracic  scoliosis. There is some aortic calcification.       Impression:      1. Cardiomegaly.  2. No acute infiltrates.        This report was finalized on 5/13/2025 11:36 AM by Dr. Fredrick Bennett M.D on Workstation: [id number]                   Assessment:     Active Hospital Problems    Diagnosis  POA    **Symptomatic anemia [D64.9]  Yes    Supratherapeutic INR [R79.1]  Yes    Warfarin anticoagulation [Z79.01]  Not Applicable    Sick " sinus syndrome [I49.5]  Yes    Essential hypertension [I10]  Yes    Atrial fibrillation [I48.91]  Yes      Resolved Hospital Problems   No resolved problems to display.          Plan:     Troponin elevation: Consistent with nonischemic myocardial injury secondary to significant anemia.  This is not ACS.  Patient has no chest pain.  EKG unchanged.  No further cardiac workup indicated.  New onset anemia, suspected acute blood loss, likely secondary to GI losses.  In setting of supratherapeutic INR.:  GI to see.  Continue transfusions.  Reasonable to reverse the INR in the setting of a life-threatening bleed.  Atrial fibrillation, permanent  I would recommend he start Eliquis moving forward.  He has had a CVA and staying without anticoagulation and not really sure is an option here.  I think Eliquis we much safer than the Coumadin moving forward since he presented with a very supratherapeutic INR  Sick sinus syndrome with intermittent ventricular pacing: His device is near VIVIAN, continue outpatient follow-up.    Thank you for allowing me to participate in the care of Sacha Whitehead. Feel free to contact me directly with any further questions or concerns.    Phan Morin MD  [facility]  05/13/25  12:20 EDT      Part of this note may be an electronic transcription/translation of spoken language to printed text using the Dragon Dictation System.

## 2025-05-13 NOTE — ED PROVIDER NOTES
EMERGENCY DEPARTMENT ENCOUNTER  Room Number:  26/26  PCP: Alvina Hauser MD  Independent Historians: Patient and Family      HPI:  Chief Complaint: had concerns including Abnormal Lab.     A complete HPI/ROS/PMH/PSH/SH/FH are unobtainable due to: None    Chronic or social conditions impacting patient care (Social Determinants of Health): None      Context: Sacha Whitehead is a 89 y.o. male with a medical history of type 2 diabetes, stroke, A-fib, anticoagulated on warfarin who presents to the ED c/o acute abnormal labs and generalized weakness.  Family reports for the last few weeks he has had generalized weakness and leg swelling.  They went to the primary care doctor and labs were drawn.  He was called today and told that his hemoglobin was very low.  He is anticoagulant on warfarin.  He denies any known blood loss.  He denies any chest pain or shortness of breath.  He lives alone.  Asad reports that he had a fall 2 months ago and has had back pain with movement ever since the patient reports it is throughout his entire back.      Review of prior external notes (non-ED) -and- Review of prior external test results outside of this encounter:  INR on 3/7/2025 was 2.4    Prescription drug monitoring program review:         PAST MEDICAL HISTORY  Active Ambulatory Problems     Diagnosis Date Noted    Atrial fibrillation 03/02/2016    Diabetes type 2, controlled 03/02/2016    Cerebrovascular accident (CVA) due to embolism of cerebral artery 03/07/2016    Transient insomnia 03/10/2016    Aneurysm of aortic root 03/10/2016    Vascular disorder 03/10/2016    Benign non-nodular prostatic hyperplasia with lower urinary tract symptoms 03/10/2016    Gastroesophageal reflux disease 03/10/2016    Essential hypertension 03/10/2016    Memory loss 03/10/2016    Microalbuminuria 03/10/2016    Mitral valve insufficiency 03/10/2016    Hypertrophic obstructive cardiomyopathy 03/10/2016    Obstructive sleep apnea syndrome 03/10/2016     Onychomycosis of toenail 03/10/2016    Renal artery stenosis 03/10/2016    Seizure disorder 03/10/2016    Sick sinus syndrome 03/10/2016    Tubular adenoma of colon 03/10/2016    Vitamin D deficiency 03/10/2016    SCC (squamous cell carcinoma), face 07/18/2016    Thoracic aortic aneurysm without rupture 10/24/2016    Warfarin anticoagulation 04/24/2017    Melanoma 08/28/2017    Prostate cancer 02/07/2018    Bilateral hearing loss 04/02/2018    Elevated blood uric acid level 04/02/2018    Actinic keratosis 07/31/2020    Nonrheumatic aortic valve insufficiency 01/06/2023    History of stroke 01/06/2023     Resolved Ambulatory Problems     Diagnosis Date Noted    Hypertension 03/02/2016    Anemia 03/02/2016    Bleeding from unknown site 03/05/2016    Abdominal aortic aneurysm 03/10/2016    Benign paroxysmal positional vertigo 03/10/2016    Fatigue 03/10/2016    Gastrointestinal hemorrhage 03/10/2016    Paroxysmal nocturnal dyspnea 03/10/2016    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    Cerebrovascular accident 03/10/2016    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    Healthcare maintenance 11/18/2016    Abnormal urinalysis 05/18/2017    Daytime somnolence 05/18/2017    Esophageal foreign body 08/29/2017    Prostate nodule 12/01/2017    Partial epilepsy with impairment of consciousness 05/13/2015    Right upper quadrant abdominal pain 10/12/2018    Right upper quadrant abdominal abscess 02/08/2019    Liver enzyme elevation 02/08/2019     Past Medical History:   Diagnosis Date    Aneurysm of abdominal aorta     Angiectasia     Aortic valve insufficiency     Arthritis     BPPV (benign paroxysmal positional vertigo)     CAD in native artery     Colon cancer     Confusion     Deafness     Diabetes mellitus     Enlarged prostate without lower urinary tract symptoms (luts)     Esophageal reflux     Facial droop     Gout     H/O concentric left ventricular hypertrophy (LVH)     Hx of being hospitalized     Hx of  being hospitalized     Hx of being hospitalized     Hx of being hospitalized     Hx of being hospitalized     Mitral regurgitation     Obstructive hypertrophic cardiomyopathy     HUMAIRA on CPAP     Peptic ulcer     PND (paroxysmal nocturnal dyspnea)     Reading comprehension disorder     Seizures     Stroke     Transient cerebral ischemia     Ventricular tachycardia          PAST SURGICAL HISTORY  Past Surgical History:   Procedure Laterality Date    CARDIAC DEFIBRILLATOR PLACEMENT      CARDIAC ELECTROPHYSIOLOGY PROCEDURE N/A 10/28/2016    Procedure: ICD battery change;  Surgeon: Nael Marcial MD;  Location: [facility] CATH INVASIVE LOCATION;  Service:     CARDIAC PACEMAKER PLACEMENT      Pacemaker Permanent Placement    COLON SURGERY      Laparoscopy Partial Colectomy - Right, dx of tubulovillous adenoma    ENDOSCOPY N/A 8/29/2017    Procedure: ESOPHAGOGASTRODUODENOSCOPY WITH FOREIGN BODY REMOVAL;  Surgeon: Lorena Spencer MD;  Location: [facility] ENDOSCOPY;  Service:     ENDOSCOPY AND COLONOSCOPY      Showing tumor in the transverse colon.     ENDOSCOPY AND COLONOSCOPY  07/02/2014    EGD negative; colonoscopy with an anastomotic ulcer that was over sewn and cauterized.     EYE SURGERY      TOTAL KNEE ARTHROPLASTY Bilateral 2006         FAMILY HISTORY  Family History   Problem Relation Age of Onset    Diabetes Father         Type 2 Diabetes Mellitus    Breast cancer Sister 59    Lung cancer Sister     Diabetes Brother         Type 2 Diabetes Mellitus    Heart disease Brother     Heart disease Other     Stroke Other         Stroke Syndrome    No Known Problems Son     No Known Problems Daughter     Heart disease Mother          SOCIAL HISTORY  Social History     Socioeconomic History    Marital status:      Spouse name: Leslie    Number of children: 4    Years of education: College   Tobacco Use    Smoking status: Former     Current packs/day: 0.00     Average packs/day: 1 pack/day for 10.0 years (10.0 ttl pk-yrs)      Types: Cigarettes     Quit date:      Years since quittin.3    Smokeless tobacco: Never    Tobacco comments:     40 YRS AGO   Vaping Use    Vaping status: Never Used   Substance and Sexual Activity    Alcohol use: Yes     Comment: 1 drink a year//Caffeine use: None    Drug use: No    Sexual activity: Not Currently         ALLERGIES  Patient has no known allergies.      REVIEW OF SYSTEMS  Review of Systems  Included in HPI  All systems reviewed and negative except for those discussed in HPI.      PHYSICAL EXAM    I have reviewed the triage vital signs and nursing notes.    ED Triage Vitals   Temp Heart Rate Resp BP SpO2   25 1026 25 1025 25 1025 25 1029 25 1025   97.6 °F (36.4 °C) 67 18 (!) 89/46 95 %      Temp src Heart Rate Source Patient Position BP Location FiO2 (%)   25 1026 25 1025 -- -- --   Oral Monitor          Physical Exam  GENERAL: Awake, alert, no acute distress  SKIN: Warm, dry, pale  HENT: Normocephalic, atraumatic  EYES: no scleral icterus  CV: Irregular rhythm, irregular rate  RESPIRATORY: normal effort, lungs clear  ABDOMEN: soft, nontender, nondistended  MUSCULOSKELETAL: no deformity, no midline cervical, thoracic, or lumbar spine tenderness  NEURO: alert, moves all extremities, follows commands            LAB RESULTS  Recent Results (from the past 24 hours)   POC Occult Blood Stool    Collection Time: 25 10:43 AM    Specimen: Stool   Result Value Ref Range    Fecal Occult Blood Positive (A) Negative    Lot Number [id number]     Expiration Date 2025     Positive Control Positive Positive    Negative Control Negative Negative   Comprehensive Metabolic Panel    Collection Time: 25 10:44 AM    Specimen: Arm, Left; Blood   Result Value Ref Range    Glucose 125 (H) 65 - 99 mg/dL    BUN 38 (H) 8 - 23 mg/dL    Creatinine 1.24 0.76 - 1.27 mg/dL    Sodium 145 136 - 145 mmol/L    Potassium 4.9 3.5 - 5.2 mmol/L    Chloride 111 (H) 98 - 107 mmol/L     CO2 22.6 22.0 - 29.0 mmol/L    Calcium 9.1 8.2 - 9.6 mg/dL    Total Protein 5.9 (L) 6.0 - 8.5 g/dL    Albumin 3.5 3.5 - 5.2 g/dL    ALT (SGPT) 20 1 - 41 U/L    AST (SGOT) 21 1 - 40 U/L    Alkaline Phosphatase 108 39 - 117 U/L    Total Bilirubin 0.5 0.0 - 1.2 mg/dL    Globulin 2.4 gm/dL    A/G Ratio 1.5 g/dL    BUN/Creatinine Ratio 30.6 (H) 7.0 - 25.0    Anion Gap 11.4 5.0 - 15.0 mmol/L    eGFR 53.5 (L) >60.0 mL/min/1.73   Protime-INR    Collection Time: 05/13/25 10:44 AM    Specimen: Arm, Left; Blood   Result Value Ref Range    Protime 103.7 (C) 11.7 - 14.2 Seconds    INR >10.00 (C) 0.90 - 1.10   CBC Auto Differential    Collection Time: 05/13/25 10:44 AM    Specimen: Arm, Left; Blood   Result Value Ref Range    WBC 4.40 3.40 - 10.80 10*3/mm3    RBC 1.73 (L) 4.14 - 5.80 10*6/mm3    Hemoglobin 5.3 (C) 13.0 - 17.7 g/dL    Hematocrit 17.6 (C) 37.5 - 51.0 %    .7 (H) 79.0 - 97.0 fL    MCH 30.6 26.6 - 33.0 pg    MCHC 30.1 (L) 31.5 - 35.7 g/dL    RDW 14.5 12.3 - 15.4 %    RDW-SD 52.9 37.0 - 54.0 fl    MPV 10.4 6.0 - 12.0 fL    Platelets 138 (L) 140 - 450 10*3/mm3    Neutrophil % 68.0 42.7 - 76.0 %    Lymphocyte % 14.3 (L) 19.6 - 45.3 %    Monocyte % 15.7 (H) 5.0 - 12.0 %    Eosinophil % 1.6 0.3 - 6.2 %    Basophil % 0.2 0.0 - 1.5 %    Immature Grans % 0.2 0.0 - 0.5 %    Neutrophils, Absolute 2.99 1.70 - 7.00 10*3/mm3    Lymphocytes, Absolute 0.63 (L) 0.70 - 3.10 10*3/mm3    Monocytes, Absolute 0.69 0.10 - 0.90 10*3/mm3    Eosinophils, Absolute 0.07 0.00 - 0.40 10*3/mm3    Basophils, Absolute 0.01 0.00 - 0.20 10*3/mm3    Immature Grans, Absolute 0.01 0.00 - 0.05 10*3/mm3    nRBC 0.9 (H) 0.0 - 0.2 /100 WBC   Type & Screen    Collection Time: 05/13/25 10:44 AM    Specimen: Arm, Left; Blood   Result Value Ref Range    ABO Type A     RH type Positive     Antibody Screen Negative     T&S Expiration Date 5/16/2025 11:59:59 PM    BNP    Collection Time: 05/13/25 10:44 AM    Specimen: Arm, Left; Blood   Result Value Ref  Range    proBNP 792.0 0.0 - 1,800.0 pg/mL   High Sensitivity Troponin T    Collection Time: 05/13/25 10:44 AM    Specimen: Arm, Left; Blood   Result Value Ref Range    HS Troponin T 85 (C) <22 ng/L   ECG 12 Lead Dyspnea    Collection Time: 05/13/25 10:47 AM   Result Value Ref Range    QT Interval 443 ms    QTC Interval 449 ms   Prepare RBC, 2 Units    Collection Time: 05/13/25 11:49 AM   Result Value Ref Range    Product Code P2046B06     Unit Number [id number]     UNIT  ABO A     UNIT  RH POS     Crossmatch Interpretation Compatible     Dispense Status IS     Blood Expiration Date 202506032359     Blood Type Barcode 6200     Product Code H9074U67     Unit Number [id number]     UNIT  ABO A     UNIT  RH POS     Crossmatch Interpretation Compatible     Dispense Status XM     Blood Expiration Date 202506042359     Blood Type Barcode 6200          RADIOLOGY  XR Chest 1 View  Result Date: 5/13/2025  XR CHEST 1 VW-5/13/2025  HISTORY: Fatigue.  Heart size is mildly enlarged. Lungs appear free of acute infiltrates. Cardiac pacemaker is seen in good position. There is mild thoracic scoliosis. There is some aortic calcification.      1. Cardiomegaly. 2. No acute infiltrates.   This report was finalized on 5/13/2025 11:36 AM by Dr. Fredrick Bennett M.D on Workstation: [id number]          MEDICATIONS GIVEN IN ER  Medications   sodium chloride 0.9 % flush 10 mL (has no administration in time range)   pantoprazole (PROTONIX) injection 80 mg (80 mg Intravenous Given 5/13/25 1057)     And   pantoprazole (PROTONIX) 40 mg in sodium chloride 0.9 % 100 mL (0.4 mg/mL) MBP (8 mg/hr Intravenous New Bag 5/13/25 1058)   prothrombin complex conc human (KCentra) IV solution 1,082 Units (has no administration in time range)   phytonadione (AQUA-MEPHYTON, VITAMIN K) 10 mg in sodium chloride 0.9 % 50 mL IVPB (has no administration in time range)         ORDERS PLACED DURING THIS VISIT:  Orders Placed This Encounter   Procedures    XR  Chest 1 View    Comprehensive Metabolic Panel    Protime-INR    CBC Auto Differential    BNP    High Sensitivity Troponin T    High Sensitivity Troponin T 1Hr    Protime-INR    Verify Informed Consent    Gastroenterology (on-call MD unless specified)    [facility] (on-call MD unless specified) Details    [facility] (on-call MD unless specified)    POC Occult Blood Stool    ECG 12 Lead Dyspnea    Type & Screen    Prepare RBC, 2 Units    Insert Peripheral IV    Inpatient Admission    CBC & Differential         OUTPATIENT MEDICATION MANAGEMENT:  Current Facility-Administered Medications Ordered in Epic   Medication Dose Route Frequency Provider Last Rate Last Admin    pantoprazole (PROTONIX) 40 mg in sodium chloride 0.9 % 100 mL (0.4 mg/mL) MBP  8 mg/hr Intravenous Continuous Rodo Bobo MD 20 mL/hr at 05/13/25 1058 8 mg/hr at 05/13/25 1058    phytonadione (AQUA-MEPHYTON, VITAMIN K) 10 mg in sodium chloride 0.9 % 50 mL IVPB  10 mg Intravenous Once Rodo Bobo MD        prothrombin complex conc human (KCentra) IV solution 1,082 Units  1,082 Units Intravenous Once Rodo Bobo MD        sodium chloride 0.9 % flush 10 mL  10 mL Intravenous PRN Rodo Bobo MD         Current Outpatient Medications Ordered in Epic   Medication Sig Dispense Refill    acetaminophen (TYLENOL) 500 MG tablet Take 2 tablets by mouth 2 (Two) Times a Day.      ascorbic acid (VITAMIN C) 1000 MG tablet Take 1 tablet by mouth Daily.      atorvastatin (LIPITOR) 20 MG tablet TAKE 1 TABLET BY MOUTH DAILY *NEEDS APPOINTMENT FOR FURTHER REFILLS (Patient taking differently: Take 1 tablet by mouth Daily.) 30 tablet 0    carvedilol (COREG) 25 MG tablet TAKE 1 TABLET BY MOUTH 2 TIMES A DAY WITH A MEAL *NEEDS TO BE SEEN FOR REFILLS (Patient taking differently: Take 1 tablet by mouth 2 (Two) Times a Day With Meals.) 60 tablet 0    folic acid (FOLVITE) 1 MG tablet Take 1 tablet by mouth Daily. PT NEEDS APPT FOR FURTHER REFILLS (Patient taking differently:  Take 1 tablet by mouth Daily.) 90 tablet 0    furosemide (LASIX) 20 MG tablet TAKE 1/2 TABLET EVERY DAY (Patient taking differently: Take 0.5 tablets by mouth Daily.) 45 tablet 3    metFORMIN (GLUCOPHAGE) 500 MG tablet TAKE 2 TABLETS BY MOUTH EVERY MORNING AND TAKE ONE TABLET BY MOUTH EVERY EVENING (Patient taking differently: Take 1 tablet by mouth 2 (Two) Times a Day With Meals.) 90 tablet 0    vitamin B-12 (CYANOCOBALAMIN) 1000 MCG tablet Take 1 tablet by mouth Daily.      warfarin (COUMADIN) 5 MG tablet TAKE ONE-HALF TABLET (2.5 MG) BY MOUTH ON TUESDAYS AND TAKE 1 TABLET (5 MG) EVERY DAY OR AS DIRECTED (Patient taking differently: Take  by mouth See Admin Instructions. TAKE ONE-HALF TABLET (2.5 MG) BY MOUTH ON TUESDAYS AND TAKE 1 TABLET (5 MG) EVERY DAY OR AS DIRECTED) 90 tablet 3         PROCEDURES  Procedures      Critical care provider statement:    Critical care time (minutes): 40.   Critical care time was exclusive of:  Separately billable procedures and treating other patients   Critical care was necessary to treat or prevent imminent or life-threatening deterioration of the following conditions:  Circulatory Failure   Critical care was time spent personally by me on the following activities:  Development of treatment plan with patient or surrogate, discussions with consultants, evaluation of patient's response to treatment, examination of patient, obtaining history from patient or surrogate, ordering and performing treatments and interventions, ordering and review of laboratory studies, ordering and review of radiographic studies, pulse oximetry, re-evaluation of patient's condition and review of old charts. Critical Care indicators:        PROGRESS, DATA ANALYSIS, CONSULTS, AND MEDICAL DECISION MAKING  All labs have been independently interpreted by me.  All radiology studies have been reviewed by me. All EKG's have been independently viewed and interpreted by me.  Discussion below represents my analysis  of pertinent findings related to patient's condition, differential diagnosis, treatment plan and final disposition.    Differential diagnosis includes but is not limited to upper GI bleed, lower GI bleed, anemia, laboratory error, symptomatic anemia, CHF, renal failure.    Clinical Scores:                                       ED Course as of 05/13/25 1212   Tue May 13, 2025   1030 BP(!): 89/46 [TR]   1049 BP: 94/45 [TR]   1049 EKG PROCEDURE    EKG time: 1047  Rhythm/Rate: Ventricular paced, rate 62    Independently Interpreted by me  Not significantly changed compared to prior 2/17/2018   [TR]   1107 Hemoglobin(!!): 5.3 [TR]   1117 Rectal exam shows brown stool with heme positive.  No gross blood. [TR]   1119 proBNP: 792.0 [TR]   1119 Creatinine: 1.24 [TR]   1121 HS Troponin T(!!): 85 [TR]   1138 INR(!!): >10.00 [TR]   1140 The patient's INR is greater than 10.  With his hemoglobin of 5.3 and symptomatic anemia, plan reversal.  I have a call out to GI for consultation.  I will call out to [facility] for admission. [TR]   1142 I updated the patient and family at the bedside.  They are agreeable to the plan. [TR]   1148 Discussed with Dr. Pedroza with [facility].  He agrees to admit. [TR]   1159 Discussing with Dr. Lopez with GI.  She agrees to consult. [TR]   1210 Discussing with Dr Red with cardiology. She agrees to consult. [TR]      ED Course User Index  [TR] Rodo Bobo MD             AS OF 12:12 EDT VITALS:    BP - 118/69  HR - 65  TEMP - 97.6 °F (36.4 °C) (Oral)  O2 SATS - 94%    COMPLEXITY OF CARE  The patient requires admission.      DIAGNOSIS  Final diagnoses:   Symptomatic anemia   Gastrointestinal hemorrhage, unspecified gastrointestinal hemorrhage type   Toxic effect of warfarin, unintentional, initial encounter         DISPOSITION  ED Disposition       ED Disposition   Decision to Admit    Condition   --    Comment   Level of Care: Telemetry [5]   Diagnosis: Symptomatic anemia [[id number]]   Admitting Physician:  SHORTY JO [[id number]]   Attending Physician: SHORTY JO [[id number]]   Certification: I Certify That Inpatient Hospital Services Are Medically Necessary For Greater Than 2 Midnights                  Please note that portions of this document were completed with a voice recognition program.    Note Disclaimer: At [facility], we believe that sharing information builds trust and better relationships. You are receiving this note because you recently visited [facility]. It is possible you will see health information before a provider has talked with you about it. This kind of information can be easy to misunderstand. To help you fully understand what it means for your health, we urge you to discuss this note with your provider.         Rodo Bobo MD  05/13/25 1211

## 2025-05-14 LAB
ANION GAP SERPL CALCULATED.3IONS-SCNC: 10 MMOL/L (ref 5–15)
BASOPHILS # BLD AUTO: 0.01 10*3/MM3 (ref 0–0.2)
BASOPHILS NFR BLD AUTO: 0.2 % (ref 0–1.5)
BH BB BLOOD EXPIRATION DATE: NORMAL
BH BB BLOOD TYPE BARCODE: 6200
BH BB DISPENSE STATUS: NORMAL
BH BB PRODUCT CODE: NORMAL
BH BB UNIT NUMBER: NORMAL
BUN SERPL-MCNC: 33 MG/DL (ref 8–23)
BUN/CREAT SERPL: 28.4 (ref 7–25)
CALCIUM SPEC-SCNC: 9 MG/DL (ref 8.2–9.6)
CHLORIDE SERPL-SCNC: 110 MMOL/L (ref 98–107)
CO2 SERPL-SCNC: 23 MMOL/L (ref 22–29)
CREAT SERPL-MCNC: 1.16 MG/DL (ref 0.76–1.27)
CROSSMATCH INTERPRETATION: NORMAL
DEPRECATED RDW RBC AUTO: 60.6 FL (ref 37–54)
EGFRCR SERPLBLD CKD-EPI 2021: 58 ML/MIN/1.73
EOSINOPHIL # BLD AUTO: 0.06 10*3/MM3 (ref 0–0.4)
EOSINOPHIL NFR BLD AUTO: 1.4 % (ref 0.3–6.2)
ERYTHROCYTE [DISTWIDTH] IN BLOOD BY AUTOMATED COUNT: 17.6 % (ref 12.3–15.4)
GLUCOSE BLDC GLUCOMTR-MCNC: 112 MG/DL (ref 70–130)
GLUCOSE BLDC GLUCOMTR-MCNC: 117 MG/DL (ref 70–130)
GLUCOSE BLDC GLUCOMTR-MCNC: 173 MG/DL (ref 70–130)
GLUCOSE BLDC GLUCOMTR-MCNC: 201 MG/DL (ref 70–130)
GLUCOSE SERPL-MCNC: 114 MG/DL (ref 65–99)
HCT VFR BLD AUTO: 21.6 % (ref 37.5–51)
HCT VFR BLD AUTO: 24 % (ref 37.5–51)
HGB BLD-MCNC: 6.8 G/DL (ref 13–17.7)
HGB BLD-MCNC: 7.6 G/DL (ref 13–17.7)
IMM GRANULOCYTES # BLD AUTO: 0.05 10*3/MM3 (ref 0–0.05)
IMM GRANULOCYTES NFR BLD AUTO: 1.2 % (ref 0–0.5)
INR PPP: 1.44 (ref 0.9–1.1)
LYMPHOCYTES # BLD AUTO: 0.57 10*3/MM3 (ref 0.7–3.1)
LYMPHOCYTES NFR BLD AUTO: 13.3 % (ref 19.6–45.3)
MCH RBC QN AUTO: 30 PG (ref 26.6–33)
MCHC RBC AUTO-ENTMCNC: 31.7 G/DL (ref 31.5–35.7)
MCV RBC AUTO: 94.9 FL (ref 79–97)
MONOCYTES # BLD AUTO: 0.58 10*3/MM3 (ref 0.1–0.9)
MONOCYTES NFR BLD AUTO: 13.5 % (ref 5–12)
NEUTROPHILS NFR BLD AUTO: 3.02 10*3/MM3 (ref 1.7–7)
NEUTROPHILS NFR BLD AUTO: 70.4 % (ref 42.7–76)
NRBC BLD AUTO-RTO: 1.9 /100 WBC (ref 0–0.2)
PLATELET # BLD AUTO: 130 10*3/MM3 (ref 140–450)
PMV BLD AUTO: 11.4 FL (ref 6–12)
POTASSIUM SERPL-SCNC: 4.3 MMOL/L (ref 3.5–5.2)
PROTHROMBIN TIME: 17.5 SECONDS (ref 11.7–14.2)
RBC # BLD AUTO: 2.53 10*6/MM3 (ref 4.14–5.8)
SODIUM SERPL-SCNC: 143 MMOL/L (ref 136–145)
UNIT  ABO: NORMAL
UNIT  RH: NORMAL
WBC NRBC COR # BLD AUTO: 4.29 10*3/MM3 (ref 3.4–10.8)

## 2025-05-14 PROCEDURE — 97530 THERAPEUTIC ACTIVITIES: CPT

## 2025-05-14 PROCEDURE — 85018 HEMOGLOBIN: CPT | Performed by: INTERNAL MEDICINE

## 2025-05-14 PROCEDURE — 97166 OT EVAL MOD COMPLEX 45 MIN: CPT

## 2025-05-14 PROCEDURE — 82948 REAGENT STRIP/BLOOD GLUCOSE: CPT

## 2025-05-14 PROCEDURE — 36415 COLL VENOUS BLD VENIPUNCTURE: CPT | Performed by: STUDENT IN AN ORGANIZED HEALTH CARE EDUCATION/TRAINING PROGRAM

## 2025-05-14 PROCEDURE — 85014 HEMATOCRIT: CPT | Performed by: INTERNAL MEDICINE

## 2025-05-14 PROCEDURE — 99232 SBSQ HOSP IP/OBS MODERATE 35: CPT | Performed by: INTERNAL MEDICINE

## 2025-05-14 PROCEDURE — 25810000003 SODIUM CHLORIDE 0.9 % SOLUTION: Performed by: STUDENT IN AN ORGANIZED HEALTH CARE EDUCATION/TRAINING PROGRAM

## 2025-05-14 PROCEDURE — 25010000002 NA FERRIC GLUC CPLX PER 12.5 MG: Performed by: STUDENT IN AN ORGANIZED HEALTH CARE EDUCATION/TRAINING PROGRAM

## 2025-05-14 PROCEDURE — P9016 RBC LEUKOCYTES REDUCED: HCPCS

## 2025-05-14 PROCEDURE — 63710000001 INSULIN LISPRO (HUMAN) PER 5 UNITS: Performed by: STUDENT IN AN ORGANIZED HEALTH CARE EDUCATION/TRAINING PROGRAM

## 2025-05-14 PROCEDURE — 85025 COMPLETE CBC W/AUTO DIFF WBC: CPT | Performed by: STUDENT IN AN ORGANIZED HEALTH CARE EDUCATION/TRAINING PROGRAM

## 2025-05-14 PROCEDURE — 85610 PROTHROMBIN TIME: CPT | Performed by: STUDENT IN AN ORGANIZED HEALTH CARE EDUCATION/TRAINING PROGRAM

## 2025-05-14 PROCEDURE — 97162 PT EVAL MOD COMPLEX 30 MIN: CPT

## 2025-05-14 PROCEDURE — 86900 BLOOD TYPING SEROLOGIC ABO: CPT

## 2025-05-14 PROCEDURE — 80048 BASIC METABOLIC PNL TOTAL CA: CPT | Performed by: STUDENT IN AN ORGANIZED HEALTH CARE EDUCATION/TRAINING PROGRAM

## 2025-05-14 PROCEDURE — 36430 TRANSFUSION BLD/BLD COMPNT: CPT

## 2025-05-14 RX ORDER — ACETAMINOPHEN 325 MG/1
650 TABLET ORAL DAILY
Status: COMPLETED | OUTPATIENT
Start: 2025-05-14 | End: 2025-05-16

## 2025-05-14 RX ORDER — CARVEDILOL 6.25 MG/1
6.25 TABLET ORAL 2 TIMES DAILY WITH MEALS
Status: DISCONTINUED | OUTPATIENT
Start: 2025-05-14 | End: 2025-05-22 | Stop reason: HOSPADM

## 2025-05-14 RX ORDER — FUROSEMIDE 10 MG/ML
40 INJECTION INTRAMUSCULAR; INTRAVENOUS EVERY MORNING
Status: DISCONTINUED | OUTPATIENT
Start: 2025-05-15 | End: 2025-05-18

## 2025-05-14 RX ORDER — CETIRIZINE HYDROCHLORIDE 10 MG/1
10 TABLET ORAL DAILY
Status: COMPLETED | OUTPATIENT
Start: 2025-05-14 | End: 2025-05-16

## 2025-05-14 RX ADMIN — OXYCODONE HYDROCHLORIDE AND ACETAMINOPHEN 1000 MG: 500 TABLET ORAL at 08:56

## 2025-05-14 RX ADMIN — FOLIC ACID 1000 MCG: 1 TABLET ORAL at 08:57

## 2025-05-14 RX ADMIN — CARVEDILOL 6.25 MG: 6.25 TABLET, FILM COATED ORAL at 18:19

## 2025-05-14 RX ADMIN — INSULIN LISPRO 3 UNITS: 100 INJECTION, SOLUTION INTRAVENOUS; SUBCUTANEOUS at 12:03

## 2025-05-14 RX ADMIN — PANTOPRAZOLE SODIUM 40 MG: 40 TABLET, DELAYED RELEASE ORAL at 08:56

## 2025-05-14 RX ADMIN — CARVEDILOL 6.25 MG: 6.25 TABLET, FILM COATED ORAL at 12:07

## 2025-05-14 RX ADMIN — INSULIN LISPRO 2 UNITS: 100 INJECTION, SOLUTION INTRAVENOUS; SUBCUTANEOUS at 21:40

## 2025-05-14 RX ADMIN — CETIRIZINE HYDROCHLORIDE 10 MG: 10 TABLET, FILM COATED ORAL at 08:57

## 2025-05-14 RX ADMIN — ACETAMINOPHEN 650 MG: 325 TABLET, FILM COATED ORAL at 08:57

## 2025-05-14 RX ADMIN — Medication 1000 MCG: at 09:02

## 2025-05-14 RX ADMIN — SODIUM CHLORIDE 250 MG: 9 INJECTION, SOLUTION INTRAVENOUS at 09:36

## 2025-05-14 NOTE — NURSING NOTE
Wound/ostomy - consult received regarding skin to gluteal cleft, patient was able to assist with turning onto his side, brief was unfastened to assess, gluteal cleft moist, skin intact, some discoloration more to inner gluteals consistent with chronic tissue injury, no interventions needed other than maintaining a clean and dry environment and use of barrier cream to protect the skin.

## 2025-05-14 NOTE — CASE MANAGEMENT/SOCIAL WORK
Continued Stay Note  [facility]     Patient Name: Sacha Whitehead  MRN: [id number]  Today's Date: 5/14/2025    Admit Date: 5/13/2025    Plan: Plan home with family.   ALISHA Godoy RN   Discharge Plan       Row Name 05/14/25 0854       Plan    Plan Plan home with family.   ALISHA Godoy RN    Patient/Family in Agreement with Plan yes    Plan Comments FACE SHEET VERIFIED/ IM LETTER SIGNED. Spoke with pt at bedside and he requested CCP call his son (Vincent).  Called Vincent ( [phone]) and spoke with him.  Pt's P[facility] is [facility]. [facility] on [address]  Pt requires assistance with ADLs.  Pt has caregivers provided by [facility].  On M-W-F they are there for 2 hours and on Tu-Thur they are there for 3 hours. Pt has a bath bench cane, grab bar, rollator and an electric scooter for home use if needed.  Pt gets his prescriptions at [facility] in [facility]. Pt does not have issues affording medications. Pt is not current with .  Pt has been in [facility] for skilled care.  CCP will follow.  Pt denies any discharge needs at present. Plan home with family.  ALISHA Godoy RN                   Discharge Codes    No documentation.                 Expected Discharge Date and Time       Expected Discharge Date Expected Discharge Time    May 16, 2025               Elinor Godoy RN

## 2025-05-14 NOTE — PLAN OF CARE
Goal Outcome Evaluation:                         Patient alert and oriented, very [place], denies pain. Patient with eyes closed off and on this shift. Patient seen by wound care today. SCD's on bilateral legs- edema noted to  bilateral leg. Patient on special bed. No acute distress noted, will continue to monitor.

## 2025-05-14 NOTE — THERAPY EVALUATION
Patient Name: Sacha Whitehead  : 1930    MRN: [id number]                              Today's Date: 2025       Admit Date: 2025    Visit Dx:     ICD-10-CM ICD-9-CM   1. Symptomatic anemia  D64.9 285.9   2. Gastrointestinal hemorrhage, unspecified gastrointestinal hemorrhage type  K92.2 578.9   3. Toxic effect of warfarin, unintentional, initial encounter  T45.511A 964.2     E858.2     Patient Active Problem List   Diagnosis    Atrial fibrillation    Diabetes type 2, controlled    Cerebrovascular accident (CVA) due to embolism of cerebral artery    Transient insomnia    Aneurysm of aortic root    Vascular disorder    Benign non-nodular prostatic hyperplasia with lower urinary tract symptoms    Gastroesophageal reflux disease    Essential hypertension    Memory loss    Microalbuminuria    Mitral valve insufficiency    Hypertrophic obstructive cardiomyopathy    Obstructive sleep apnea syndrome    Onychomycosis of toenail    Renal artery stenosis    Seizure disorder    Sick sinus syndrome    Tubular adenoma of colon    Vitamin D deficiency    SCC (squamous cell carcinoma), face    Thoracic aortic aneurysm without rupture    Warfarin anticoagulation    Melanoma    Prostate cancer    Bilateral hearing loss    Elevated blood uric acid level    Actinic keratosis    Nonrheumatic aortic valve insufficiency    History of stroke    Symptomatic anemia    Supratherapeutic INR     Past Medical History:   Diagnosis Date    Anemia     Aneurysm of abdominal aorta     Dr. Red    Aneurysm of aortic root     Dr. Red    Angiectasia     Bleeding in stomach; EGD 2014    Aortic valve insufficiency     Arthritis     Osteoarthritis    Atrial fibrillation     Permanent, per Dr. Red; on long-term Coumadin anticoagulation since around .    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    Enlarged prostate without lower urinary tract symptoms (luts)     Esophageal reflux     Facial droop     Right sided    Fatigue     Gastrointestinal hemorrhage 3/10/2016    Description: with angioectasia stomach 2013    Gout     H/O concentric left ventricular hypertrophy (LVH)     LVEF 50% documented on 06/14/2013 by echocardiogram.     Hx of being hospitalized     On 12/11/2013 with hemoglobin of 6 and heme-positive stool. EGD showed a telangiectasia which was lasered by Dr. Carolina Servin.     Hx of being hospitalized     On 04/04/2014 for surgical resection of bleeding at the anastomotic site in colon. Had surgical re-resection and discharged home.    Hx of being hospitalized     On 05/19/2014 with severe anemia.     Hx of being hospitalized     On 12/15/2014 with further resection of sites of possible blood loss based on angiography of the abdominal masses.     Hx of being hospitalized     At [facility] with a grand mal seizure and started on Keppra in 03/2015    Hypertension     Liver enzyme elevation 2/8/2019    Melanoma 8/28/2017    dx'd 2017     Memory loss     Microalbuminuria     Mitral regurgitation     Obstructive hypertrophic cardiomyopathy     Per Dr. Red    HUMAIRA on CPAP     Partial epilepsy with impairment of consciousness 5/13/2015    Overview:  2015 IMO UPDATE  Overview:  2015 IMO UPDATE    Peptic ulcer     On EGD 2014    PND (paroxysmal nocturnal dyspnea)     Reading comprehension disorder     Renal artery stenosis     Seizures     Strarted 3/2015; admitted inpt [place]    Sick sinus syndrome     With hx non-sustained Vtach s/p pacer and AICD    Stroke     Occipital stroke with vision loss in 07/2014.    Transient cerebral ischemia     Embolic    Tubular adenoma of colon     Transverse colon, s/p resection (surgical), cause of severe FE  deficiency anemia.    Ventricular tachycardia     Vitamin D deficiency      Past Surgical History:   Procedure Laterality Date    CARDIAC DEFIBRILLATOR PLACEMENT      CARDIAC ELECTROPHYSIOLOGY PROCEDURE N/A 10/28/2016    Procedure: ICD battery change;  Surgeon: Nael Marcial MD;  Location: [facility] CATH INVASIVE LOCATION;  Service:     CARDIAC PACEMAKER PLACEMENT      Pacemaker Permanent Placement    COLON SURGERY      Laparoscopy Partial Colectomy - Right, dx of tubulovillous adenoma    ENDOSCOPY N/A 8/29/2017    Procedure: ESOPHAGOGASTRODUODENOSCOPY WITH FOREIGN BODY REMOVAL;  Surgeon: Lorena Spencer MD;  Location: [facility] ENDOSCOPY;  Service:     ENDOSCOPY AND COLONOSCOPY      Showing tumor in the transverse colon.     ENDOSCOPY AND COLONOSCOPY  07/02/2014    EGD negative; colonoscopy with an anastomotic ulcer that was over sewn and cauterized.     EYE SURGERY      TOTAL KNEE ARTHROPLASTY Bilateral 2006      General Information       Row Name 05/14/25 1529          OT Time and Intention    Document Type evaluation  -PP     Mode of Treatment individual therapy;occupational therapy  -PP       Row Name 05/14/25 1529          General Information    Patient Profile Reviewed yes  -PP     Prior Level of Function independent:;ADL's;all household mobility  Pt reports living with a friend and being (I) for ADLs and mob using rwx.  -PP     Existing Precautions/Restrictions fall  -PP     Barriers to Rehab hearing deficit  very [place], hearing aids in room  -PP       Row Name 05/14/25 1529          Living Environment    Current Living Arrangements home  -PP     People in Home friend(s)  -PP       Row Name 05/14/25 1529          Cognition    Orientation Status (Cognition) oriented to;person;place  -PP       Row Name 05/14/25 1529          Safety Issues/Impairments Affecting Functional Mobility    Impairments Affecting Function (Mobility) balance;strength;endurance/activity tolerance;pain  -PP     Comment, Safety  Issues/Impairments (Mobility) gait belt and non skid socks worn for safety  -PP               User Key  (r) = Recorded By, (t) = Taken By, (c) = Cosigned By      Initials Name Provider Type    PP Lianne Moulton OT Occupational Therapist                     Mobility/ADL's       Row Name 05/14/25 1530          Bed Mobility    Bed Mobility supine-sit;sit-supine  -PP     Supine-Sit Tiff (Bed Mobility) moderate assist (50% patient effort);2 person assist  -PP     Sit-Supine Tiff (Bed Mobility) minimum assist (75% patient effort)  -PP     Assistive Device (Bed Mobility) bed rails  -PP       Row Name 05/14/25 1530          Transfers    Transfers stand-sit transfer;sit-stand transfer  -PP       Row Name 05/14/25 1530          Sit-Stand Transfer    Sit-Stand Tiff (Transfers) contact guard;2 person assist  -PP     Assistive Device (Sit-Stand Transfers) walker, front-wheeled  -PP       Row Name 05/14/25 1530          Functional Mobility    Functional Mobility- Ind. Level contact guard assist  -PP     Functional Mobility- Device walker, front-wheeled  -PP     Functional Mobility- Comment Pt observed ambulating with PT prior to arrival with CGA short household distance.  -PP       Row Name 05/14/25 1530          Activities of Daily Living    BADL Assessment/Intervention lower body dressing;grooming;feeding;toileting  -PP       Row Name 05/14/25 1530          Self-Feeding Assessment/Training    Tiff Level (Feeding) feeding skills;independent  -PP       Row Name 05/14/25 1530          Lower Body Dressing Assessment/Training    Tiff Level (Lower Body Dressing) doff;don;socks;dependent (less than 25% patient effort)  -PP     Position (Lower Body Dressing) sitting up in bed  -PP       Row Name 05/14/25 1530          Grooming Assessment/Training    Comment, (Grooming) likely reqs Setup assist  -PP       Row Name 05/14/25 1530          Toileting Assessment/Training    Tiff Level  (Toileting) toileting skills;maximum assist (25% patient effort)  -PP     Comment, (Toileting) external male catheter and brief present  -PP               User Key  (r) = Recorded By, (t) = Taken By, (c) = Cosigned By      Initials Name Provider Type    PP Lianne Moulton OT Occupational Therapist                   Obj/Interventions       Row Name 05/14/25 1527          Sensory Assessment (Somatosensory)    Sensory Assessment (Somatosensory) UE sensation intact  -PP     Sensory Assessment per pt report  -PP       Row Name 05/14/25 1527          Vision Assessment/Intervention    Visual Impairment/Limitations WFL  -PP       [facility] Name 05/14/25 1527          Range of Motion Comprehensive    General Range of Motion bilateral upper extremity ROM WFL;other (see comments)  -PP     Comment, General Range of Motion Ren shoulder flex slightly limited, otherwise WFL  -PP       Row Name 05/14/25 1527          Strength Comprehensive (MMT)    General Manual Muscle Testing (MMT) Assessment upper extremity strength deficits identified  -PP     Comment, General Manual Muscle Testing (MMT) Assessment Gen BUE weakness noted, grossly 3+/5  -PP       Row Name 05/14/25 1527          Motor Skills    Motor Skills functional endurance  -PP     Functional Endurance fair  -PP       [facility] Name 05/14/25 1527          Balance    Balance Assessment sitting static balance;standing static balance  -PP     Static Sitting Balance supervision  -PP     Position, Sitting Balance sitting edge of bed  -PP     Static Standing Balance contact guard  -PP     Position/Device Used, Standing Balance supported;walker, front-wheeled  -PP     Balance Interventions sitting;standing;static;supported  -PP               User Key  (r) = Recorded By, (t) = Taken By, (c) = Cosigned By      Initials Name Provider Type    PP Lianne Moulton OT Occupational Therapist                   Goals/Plan       Row Name 05/14/25 1543          Bed Mobility Goal 1 (OT)     Activity/Assistive Device (Bed Mobility Goal 1, OT) bed mobility activities, all  -PP     [place] Level/Cues Needed (Bed Mobility Goal 1, OT) modified independence  -PP     Time Frame (Bed Mobility Goal 1, OT) short term goal (STG);1 week  -PP     Progress/Outcomes (Bed Mobility Goal 1, OT) goal ongoing  -PP       Row Name 05/14/25 1543          Transfer Goal 1 (OT)    Activity/Assistive Device (Transfer Goal 1, OT) transfers, all  -PP     [place] Level/Cues Needed (Transfer Goal 1, OT) modified independence  -PP     Time Frame (Transfer Goal 1, OT) short term goal (STG);1 week  -PP     Strategies/Barriers (Transfers Goal 1, OT) using least restrictive AD as needed and trained  -PP     Progress/Outcome (Transfer Goal 1, OT) goal ongoing  -PP       Row Name 05/14/25 1543          Dressing Goal 1 (OT)    Activity/Device (Dressing Goal 1, OT) dressing skills, all  -PP     [place]/Cues Needed (Dressing Goal 1, OT) modified independence  -PP     Time Frame (Dressing Goal 1, OT) short term goal (STG);1 week  -PP     Progress/Outcome (Dressing Goal 1, OT) goal ongoing  -PP       Row Name 05/14/25 1547          Toileting Goal 1 (OT)    Activity/Device (Toileting Goal 1, OT) toileting skills, all;adjust/manage clothing;perform perineal hygiene;commode  -PP     [place] Level/Cues Needed (Toileting Goal 1, OT) modified independence  -PP     Time Frame (Toileting Goal 1, OT) short term goal (STG);1 week  -PP     Progress/Outcome (Toileting Goal 1, OT) goal ongoing  -PP       Row Name 05/14/25 1543          Grooming Goal 1 (OT)    Activity/Device (Grooming Goal 1, OT) grooming skills, all  -PP     [place] (Grooming Goal 1, OT) modified independence  -PP     Time Frame (Grooming Goal 1, OT) short term goal (STG);1 week  -PP     Progress/Outcome (Grooming Goal 1, OT) goal ongoing  -PP       Row Name 05/14/25 1541          Problem Specific Goal 1 (OT)    Problem Specific Goal 1 (OT) Pt and family will demo  safe technqiues with ADls, transfers, HEP and AE prior to d/c home.  -PP     Time Frame (Problem Specific Goal 1, OT) short term goal (STG);1 week  -PP       Row Name 05/14/25 3271          Therapy Assessment/Plan (OT)    Planned Therapy Interventions (OT) activity tolerance training;BADL retraining;functional balance retraining;patient/caregiver education/training;ROM/therapeutic exercise;strengthening exercise;transfer/mobility retraining  -PP               User Key  (r) = Recorded By, (t) = Taken By, (c) = Cosigned By      Initials Name Provider Type    PP Lianne Moulton OT Occupational Therapist                   Clinical Impression       Row Name 05/14/25 6725          Pain Assessment    Pre/Posttreatment Pain Comment Pt reports intermittent back pain t/o session randomly with movements. But does not rate  -PP       Row Name 05/14/25 8131          Plan of Care Review    Plan of Care Reviewed With patient  -PP     Progress no change  -PP     Outcome Evaluation Patient is a 89 y/o male admitted for symptomatic anemia and further workup after abnormal lab work at PCP. PMHx includes type 2 diabetes, stroke, A-fib, anticoagulated on warfarin. Pt recieved PRBCs severa times since admission. Hgb 7.6 and INR 1.44 noted to improve significantly prior to being seen this PM. At baseline, pt reports living w/ friend and being (I) for ADLs and mob using rwx. Today, pt presents to OT Eval below baseline performance for ADLs and mob requring dep A for toileting and LBD. Likely S/u for UB ADLs. He was observed sitting EOB w/ SPV working with PT upon arrival. CGA/ rwx for STS and for mob short dis with PT. BUE WFL but some limited shoulder flex and MMT grossly 3+/5. Pt Min Ax1 to return to supine. Rec SNF at d/c. IPOT will continue to monitor to maximize patient safety and (I) w/ ADLs/ xfers and address fxnl deficits.  -PP       Row Name 05/14/25 2738          Therapy Assessment/Plan (OT)    Rehab Potential (OT) good  -PP      Criteria for Skilled Therapeutic Interventions Met (OT) yes;skilled treatment is necessary  -PP     Therapy Frequency (OT) 5 times/wk  -PP       Row Name 05/14/25 1533          Therapy Plan Review/Discharge Plan (OT)    Anticipated Discharge Disposition (OT) skilled nursing facility  -PP       Row Name 05/14/25 1533          Vital Signs    O2 Delivery Pre Treatment room air  -PP       Row Name 05/14/25 1533          Positioning and Restraints    Pre-Treatment Position in bed  -PP     Post Treatment Position bed  -PP     In Bed notified nsg;fowlers;call light within reach;encouraged to call for assist;exit alarm on  -PP               User Key  (r) = Recorded By, (t) = Taken By, (c) = Cosigned By      Initials Name Provider Type    PP Lianne Moulton, AURA Occupational Therapist                   Outcome Measures       Row Name 05/14/25 1544          How much help from another is currently needed...    Putting on and taking off regular lower body clothing? 1  -PP     Bathing (including washing, rinsing, and drying) 2  -PP     Toileting (which includes using toilet bed pan or urinal) 2  -PP     Putting on and taking off regular upper body clothing 2  -PP     Taking care of personal grooming (such as brushing teeth) 3  -PP     Eating meals 3  -PP     AM-PAC 6 Clicks Score (OT) 13  -PP       Row Name 05/14/25 1518 05/14/25 0840       How much help from another person do you currently need...    Turning from your back to your side while in flat bed without using bedrails? 2  -MS 2  -VJ    Moving from lying on back to sitting on the side of a flat bed without bedrails? 2  -MS 2  -VJ    Moving to and from a bed to a chair (including a wheelchair)? 3  -MS 2  -VJ    Standing up from a chair using your arms (e.g., wheelchair, bedside chair)? 3  -MS 2  -VJ    Climbing 3-5 steps with a railing? 2  -MS 2  -VJ    To walk in hospital room? 3  -MS 2  -VJ    AM-PAC 6 Clicks Score (PT) 15  -MS 12  -VJ    Highest Level of Mobility  Goal Move to Chair/Commode-4  -MS Move to Chair/Commode-4  -VJ      Row Name 05/14/25 1544 05/14/25 1518       Functional Assessment    Outcome Measure Options AM-PAC 6 Clicks Daily Activity (OT)  -PP AM-PAC 6 Clicks Basic Mobility (PT)  -MS              User Key  (r) = Recorded By, (t) = Taken By, (c) = Cosigned By      Initials Name Provider Type    Jesika Connelly, RN Registered Nurse    Usama Bob, PT Physical Therapist    Lianne Prado, OT Occupational Therapist                    Occupational Therapy Education       Title: PT OT SLP Therapies (In Progress)       Topic: Occupational Therapy (In Progress)       Point: ADL training (Done)       Learning Progress Summary            Patient Acceptance, E, VU by PP at 5/14/2025 1544    Comment: Pt Ed on OT role and dc planning.                                      User Key       Initials Effective Dates Name Provider Type Discipline    PP 06/09/23 -  Lianne Moulton, AURA Occupational Therapist OT                  OT Recommendation and Plan  Planned Therapy Interventions (OT): activity tolerance training, BADL retraining, functional balance retraining, patient/caregiver education/training, ROM/therapeutic exercise, strengthening exercise, transfer/mobility retraining  Therapy Frequency (OT): 5 times/wk  Plan of Care Review  Plan of Care Reviewed With: patient  Progress: no change  Outcome Evaluation: Patient is a 89 y/o male admitted for symptomatic anemia and further workup after abnormal lab work at PCP. PMHx includes type 2 diabetes, stroke, A-fib, anticoagulated on warfarin. Pt recieved PRBCs severa times since admission. Hgb 7.6 and INR 1.44 noted to improve significantly prior to being seen this PM. At baseline, pt reports living w/ friend and being (I) for ADLs and mob using rwx. Today, pt presents to OT Eval below baseline performance for ADLs and mob requring dep A for toileting and LBD. Likely S/u for UB ADLs. He was observed  sitting EOB w/ SPV working with PT upon arrival. CGA/ rwx for STS and for mob short dis with PT. BUE WFL but some limited shoulder flex and MMT grossly 3+/5. Pt Min Ax1 to return to supine. Rec SNF at d/c. IPOT will continue to monitor to maximize patient safety and (I) w/ ADLs/ xfers and address fxnl deficits.     Time Calculation:   Evaluation Complexity (OT)  Review Occupational Profile/Medical/Therapy History Complexity: expanded/moderate complexity  Assessment, Occupational Performance/Identification of Deficit Complexity: 3-5 performance deficits  Clinical Decision Making Complexity (OT): detailed assessment/moderate complexity  Overall Complexity of Evaluation (OT): moderate complexity     Time Calculation- OT       Row Name 05/14/25 1514             Time Calculation- OT    OT Start Time 1353  -PP      OT Stop Time 1409  -PP      OT Time Calculation (min) 16 min  -PP      Total Timed Code Minutes- OT 8 minute(s)  -PP      OT Received On 05/14/25  -PP      OT - Next Appointment 05/15/25  -PP      OT Goal Re-Cert Due Date 05/28/25  -PP         Timed Charges    64029 - OT Therapeutic Activity Minutes 8  -PP         Untimed Charges    OT Eval/Re-eval Minutes 8  -PP         Total Minutes    Timed Charges Total Minutes 8  -PP      Untimed Charges Total Minutes 8  -PP       Total Minutes 16  -PP                User Key  (r) = Recorded By, (t) = Taken By, (c) = Cosigned By      Initials Name Provider Type    PP Saige, Porshia, OT Occupational Therapist                  Therapy Charges for Today       Code Description Service Date Service Provider Modifiers Qty    85160626272 HC OT THERAPEUTIC ACT EA 15 MIN 5/14/2025 Cannon, Porshia, OT GO 1    37013787445 HC OT EVAL MOD COMPLEXITY 2 5/14/2025 Cannon, Porshia, OT GO 1                 Porshia Cannon, OT  5/14/2025

## 2025-05-14 NOTE — PLAN OF CARE
Goal Outcome Evaluation:  Plan of Care Reviewed With: patient        Progress: no change  Outcome Evaluation: Patient is a 89 y/o male admitted for symptomatic anemia and further workup after abnormal lab work at PCP. PMHx includes type 2 diabetes, stroke, A-fib, anticoagulated on warfarin. Pt recieved PRBCs severa times since admission. Hgb 7.6 and INR 1.44 noted to improve significantly prior to being seen this PM. At baseline, pt reports living w/ friend and being (I) for ADLs and mob using rwx. Today, pt presents to OT Eval below baseline performance for ADLs and mob requring dep A for toileting and LBD. Likely S/u for UB ADLs. He was observed sitting EOB w/ SPV working with PT upon arrival. CGA/ rwx for STS and for mob short dis with PT. BUE WFL but some limited shoulder flex and MMT grossly 3+/5. Pt Min Ax1 to return to supine. Rec SNF at d/c. IPOT will continue to monitor to maximize patient safety and (I) w/ ADLs/ xfers and address fxnl deficits.    Anticipated Discharge Disposition (OT): skilled nursing facility

## 2025-05-14 NOTE — PROGRESS NOTES
[facility]  Inpatient Progress Note    Reason for Follow Up:  anemia, heme +    Subjective     Interval History:   No signs of bleeding  No new issues-discussed with nursing.  No family at the bedside.    Current Facility-Administered Medications:     acetaminophen (TYLENOL) tablet 650 mg, 650 mg, Oral, Daily, Farrukh Pedroza MD, 650 mg at 05/14/25 0857    ascorbic acid (VITAMIN C) tablet 1,000 mg, 1,000 mg, Oral, Daily, Farrukh Pedroza MD, 1,000 mg at 05/14/25 0856    sennosides-docusate (PERICOLACE) 8.6-50 MG per tablet 2 tablet, 2 tablet, Oral, BID PRN **AND** polyethylene glycol (MIRALAX) packet 17 g, 17 g, Oral, Daily PRN **AND** bisacodyl (DULCOLAX) EC tablet 5 mg, 5 mg, Oral, Daily PRN **AND** bisacodyl (DULCOLAX) suppository 10 mg, 10 mg, Rectal, Daily PRN, Farrukh Pedroza MD    carvedilol (COREG) tablet 6.25 mg, 6.25 mg, Oral, BID With Meals, Farrukh Pedroza MD, 6.25 mg at 05/14/25 1819    cetirizine (zyrTEC) tablet 10 mg, 10 mg, Oral, Daily, Farrukh Pedroza MD, 10 mg at 05/14/25 0857    ferric gluconate (FERRLECIT) 250 MG in sodium chloride 0.9% 250 mL IVPB, 250 mg, Intravenous, Daily, Farrukh Pedroza MD, Last Rate: 125 mL/hr at 05/14/25 0936, 250 mg at 05/14/25 0936    folic acid (FOLVITE) tablet 1,000 mcg, 1,000 mcg, Oral, Daily, Farrukh Pedroza MD, 1,000 mcg at 05/14/25 0857    [START ON 5/15/2025] furosemide (LASIX) injection 40 mg, 40 mg, Intravenous, QAM, Farrukh Pedroza MD    insulin lispro (HUMALOG/ADMELOG) injection 2-7 Units, 2-7 Units, Subcutaneous, 4x Daily AC & at Bedtime, Farrukh Pedroza MD, 3 Units at 05/14/25 1203    ondansetron ODT (ZOFRAN-ODT) disintegrating tablet 4 mg, 4 mg, Oral, Q6H PRN **OR** ondansetron (ZOFRAN) injection 4 mg, 4 mg, Intravenous, Q6H PRN, Farrukh Pedroza MD    pantoprazole (PROTONIX) EC tablet 40 mg, 40 mg, Oral, [facility] Jessica SCHUSTER Lauren C., MD, 40 mg at 05/14/25 0884     [COMPLETED] Insert Peripheral IV, , , Once **AND** sodium chloride 0.9 % flush 10 mL, 10 mL, Intravenous, PRN, Rodo Bobo MD    vitamin B-12 (CYANOCOBALAMIN) tablet 1,000 mcg, 1,000 mcg, Oral, Daily, [place], Farrukh Sanchez MD, 1,000 mcg at 05/14/25 0902  Review of Systems:    The following systems were reviewed and negative;  respiratory and gastrointestinal    Objective     Vital Signs  Temp:  [97.4 °F (36.3 °C)-98.3 °F (36.8 °C)] 98.2 °F (36.8 °C)  Heart Rate:  [60-80] 66  Resp:  [17-18] 18  BP: (108-127)/(53-78) 126/78  Body mass index is 30.68 kg/m².    Intake/Output Summary (Last 24 hours) at 5/14/2025 2017  Last data filed at 5/14/2025 1410  Gross per 24 hour   Intake 1260 ml   Output --   Net 1260 ml     No intake/output data recorded.     Physical Exam:   General: NAD   Eyes: Normal lids and lashes, no scleral icterus   Neck: supple, normal ROM   Skin: warm and dry, not jaundiced   Pulm:   regular and unlabored   Abdomen: soft, nontender, nondistended        Results Review:     I reviewed the patient's new clinical results.    Results from last 7 days   Lab Units 05/14/25  0823 05/14/25  0025 05/13/25  1625 05/13/25  1044   WBC 10*3/mm3 4.29  --   --  4.40   HEMOGLOBIN g/dL 7.6* 6.8* 6.0* 5.3*   HEMATOCRIT % 24.0* 21.6* 18.9* 17.6*   PLATELETS 10*3/mm3 130*  --   --  138*     Results from last 7 days   Lab Units 05/14/25  0823 05/13/25  1044   SODIUM mmol/L 143 145   POTASSIUM mmol/L 4.3 4.9   CHLORIDE mmol/L 110* 111*   CO2 mmol/L 23.0 22.6   BUN mg/dL 33* 38*   CREATININE mg/dL 1.16 1.24   CALCIUM mg/dL 9.0 9.1   BILIRUBIN mg/dL  --  0.5   ALK PHOS U/L  --  108   ALT (SGPT) U/L  --  20   AST (SGOT) U/L  --  21   GLUCOSE mg/dL 114* 125*     Results from last 7 days   Lab Units 05/14/25  0823 05/13/25  1044   INR  1.44* >10.00*     Lab Results   Lab Value Date/Time    LIPASE 41 10/12/2018 2021    LIPASE 39 10/12/2018 1504    LIPASE 21 02/09/2015 1638    LIPASE 22 06/30/2014 1816       Radiology:  XR  Chest 1 View   Final Result   1. Cardiomegaly.   2. No acute infiltrates.           This report was finalized on 5/13/2025 11:36 AM by Dr. Fredrick Bennett M.D on Workstation: [id number]              Assessment & Plan     Active Hospital Problems    Diagnosis     **Symptomatic anemia     Supratherapeutic INR     Warfarin anticoagulation     Sick sinus syndrome     Essential hypertension     Atrial fibrillation        Assessment:  Symptomatic anemia  Coumadin toxicity  Heme positive stool in the setting of Coumadin toxicity  History of peptic ulcer disease      Plan:  Coagulopathy corrected-no signs of bleeding.  Hemoglobin stable posttransfusion of 3 units  Heme positive but no melena on presentation.  Family desires conservative management.  Continue pantoprazole once daily  Okay to resume Coumadin when needed with close follow-up of his PT/INR  No further recommendations from GI standpoint-we are available as needed      I discussed the patients findings and my recommendations with nursing staff.            Snow Lopez M.D.  [facility], [place], operated by [facility]  [phone]

## 2025-05-14 NOTE — PLAN OF CARE
Goal Outcome Evaluation:      Patient alert and oriented.Very [place].Denies any pain.VSS.Room air.Last hgb at 6.8 now receiving another 1 unit of blood.Patient received 2 units yesterday.No s/s of active.Afib/POD.                                       Quality 226: Preventive Care And Screening: Tobacco Use: Screening And Cessation Intervention: Patient screened for tobacco use and is an ex/non-smoker Detail Level: Detailed Quality 130: Documentation Of Current Medications In The Medical Record: Current Medications Documented Name And Contact Information For Health Care Proxy: Emiliano Eliel [phone] Quality 47: Advance Care Plan: Advance Care Planning discussed and documented; advance care plan or surrogate decision maker documented in the medical record. Quality 110: Preventive Care And Screening: Influenza Immunization: Influenza Immunization previously received during influenza season Quality 431: Preventive Care And Screening: Unhealthy Alcohol Use - Screening: Patient not identified as an unhealthy alcohol user when screened for unhealthy alcohol use using a systematic screening method

## 2025-05-14 NOTE — PLAN OF CARE
Goal Outcome Evaluation:  Plan of Care Reviewed With: patient           Outcome Evaluation: Pt. is a 89 year old Male admitted to the hospital with symptomatic anemia.  Pt. reports that prior to admission he was using a Rwx for ambulation and has caregivers that come throughout the week to assist as needed.  Pt. currently presents with decreased strength, decreased balance, and decreased tolerance to functional activity. This PM, pt. able to ambulate 10 feet, CGA x 2, with use of Rwx. Pt. requries Min-Mod. assist x 2 for bed mobility and CGA x 2 for sit <-> stand transfers.  BLE ther. ex. program completed for general strengthening. Verbal/tactile cues given during ambulation for posture correction and Rwx guidance.  Pt. will benefit from skilled inpt. P.T. to address his functional deficits and to assist pt. in regaining his maximum level of independence with functional mobility.    Anticipated Discharge Disposition (PT): home with 24/7 care, home with home health (Pending pt. progress)

## 2025-05-14 NOTE — THERAPY EVALUATION
Patient Name: Sacha Whitehead  : 1930    MRN: [id number]                              Today's Date: 2025       Admit Date: 2025    Visit Dx:     ICD-10-CM ICD-9-CM   1. Symptomatic anemia  D64.9 285.9   2. Gastrointestinal hemorrhage, unspecified gastrointestinal hemorrhage type  K92.2 578.9   3. Toxic effect of warfarin, unintentional, initial encounter  T45.511A 964.2     E858.2     Patient Active Problem List   Diagnosis    Atrial fibrillation    Diabetes type 2, controlled    Cerebrovascular accident (CVA) due to embolism of cerebral artery    Transient insomnia    Aneurysm of aortic root    Vascular disorder    Benign non-nodular prostatic hyperplasia with lower urinary tract symptoms    Gastroesophageal reflux disease    Essential hypertension    Memory loss    Microalbuminuria    Mitral valve insufficiency    Hypertrophic obstructive cardiomyopathy    Obstructive sleep apnea syndrome    Onychomycosis of toenail    Renal artery stenosis    Seizure disorder    Sick sinus syndrome    Tubular adenoma of colon    Vitamin D deficiency    SCC (squamous cell carcinoma), face    Thoracic aortic aneurysm without rupture    Warfarin anticoagulation    Melanoma    Prostate cancer    Bilateral hearing loss    Elevated blood uric acid level    Actinic keratosis    Nonrheumatic aortic valve insufficiency    History of stroke    Symptomatic anemia    Supratherapeutic INR     Past Medical History:   Diagnosis Date    Anemia     Aneurysm of abdominal aorta     Dr. Red    Aneurysm of aortic root     Dr. Red    Angiectasia     Bleeding in stomach; EGD 2014    Aortic valve insufficiency     Arthritis     Osteoarthritis    Atrial fibrillation     Permanent, per Dr. Red; on long-term Coumadin anticoagulation since around .    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    Enlarged prostate without lower urinary tract symptoms (luts)     Esophageal reflux     Facial droop     Right sided    Fatigue     Gastrointestinal hemorrhage 3/10/2016    Description: with angioectasia stomach 2013    Gout     H/O concentric left ventricular hypertrophy (LVH)     LVEF 50% documented on 06/14/2013 by echocardiogram.     Hx of being hospitalized     On 12/11/2013 with hemoglobin of 6 and heme-positive stool. EGD showed a telangiectasia which was lasered by Dr. Carolina Servin.     Hx of being hospitalized     On 04/04/2014 for surgical resection of bleeding at the anastomotic site in colon. Had surgical re-resection and discharged home.    Hx of being hospitalized     On 05/19/2014 with severe anemia.     Hx of being hospitalized     On 12/15/2014 with further resection of sites of possible blood loss based on angiography of the abdominal masses.     Hx of being hospitalized     At [facility] with a grand mal seizure and started on Keppra in 03/2015    Hypertension     Liver enzyme elevation 2/8/2019    Melanoma 8/28/2017    dx'd 2017     Memory loss     Microalbuminuria     Mitral regurgitation     Obstructive hypertrophic cardiomyopathy     Per Dr. Red    HUMAIRA on CPAP     Partial epilepsy with impairment of consciousness 5/13/2015    Overview:  2015 IMO UPDATE  Overview:  2015 IMO UPDATE    Peptic ulcer     On EGD 2014    PND (paroxysmal nocturnal dyspnea)     Reading comprehension disorder     Renal artery stenosis     Seizures     Strarted 3/2015; admitted inpt [place]    Sick sinus syndrome     With hx non-sustained Vtach s/p pacer and AICD    Stroke     Occipital stroke with vision loss in 07/2014.    Transient cerebral ischemia     Embolic    Tubular adenoma of colon     Transverse colon, s/p resection (surgical), cause of severe FE  deficiency anemia.    Ventricular tachycardia     Vitamin D deficiency      Past Surgical History:   Procedure Laterality Date    CARDIAC DEFIBRILLATOR PLACEMENT      CARDIAC ELECTROPHYSIOLOGY PROCEDURE N/A 10/28/2016    Procedure: ICD battery change;  Surgeon: Nael Marcial MD;  Location: [facility] CATH INVASIVE LOCATION;  Service:     CARDIAC PACEMAKER PLACEMENT      Pacemaker Permanent Placement    COLON SURGERY      Laparoscopy Partial Colectomy - Right, dx of tubulovillous adenoma    ENDOSCOPY N/A 8/29/2017    Procedure: ESOPHAGOGASTRODUODENOSCOPY WITH FOREIGN BODY REMOVAL;  Surgeon: Lorena Spencer MD;  Location: [facility] ENDOSCOPY;  Service:     ENDOSCOPY AND COLONOSCOPY      Showing tumor in the transverse colon.     ENDOSCOPY AND COLONOSCOPY  07/02/2014    EGD negative; colonoscopy with an anastomotic ulcer that was over sewn and cauterized.     EYE SURGERY      TOTAL KNEE ARTHROPLASTY Bilateral 2006      General Information       Row Name 05/14/25 1513          Physical Therapy Time and Intention    Document Type evaluation  Pt. admitted with Symptomatic anemia  -MS     Mode of Treatment physical therapy  -MS       Row Name 05/14/25 1513          General Information    Patient Profile Reviewed yes  -MS     Prior Level of Function --  Use of Rwx for ambulation  -MS     Existing Precautions/Restrictions fall   Exit alarm  -MS     Barriers to Rehab hearing deficit  -MS       Row Name 05/14/25 1519          Cognition    Orientation Status (Cognition) oriented to;person;place  -MS       Row Name 05/14/25 1513          Safety Issues/Impairments Affecting Functional Mobility    Comment, Safety Issues/Impairments (Mobility) Gait belt used for safety.  -MS               User Key  (r) = Recorded By, (t) = Taken By, (c) = Cosigned By      Initials Name Provider Type    MS Usama Martínez, PT Physical Therapist                   Mobility       Row Name 05/14/25 1514          Bed Mobility    Bed Mobility  supine-sit;sit-supine  -MS     Supine-Sit [place] (Bed Mobility) moderate assist (50% patient effort);2 person assist  -MS     Sit-Supine [place] (Bed Mobility) minimum assist (75% patient effort)  -MS       Row Name 05/14/25 1514          Sit-Stand Transfer    Sit-Stand [place] (Transfers) contact guard;2 person assist  -MS     Assistive Device (Sit-Stand Transfers) walker, front-wheeled  -MS       Row Name 05/14/25 1514          Gait/Stairs (Locomotion)    [place] Level (Gait) contact guard;2 person assist  -MS     Assistive Device (Gait) walker, front-wheeled  -MS     Distance in Feet (Gait) 10  -MS     Deviations/Abnormal Patterns (Gait) mairano decreased;stride length decreased  -MS     Bilateral Gait Deviations forward flexed posture  -MS     Comment, (Gait/Stairs) Verbal/tactile cues given for posture correction and Rwx guidance.  -MS               User Key  (r) = Recorded By, (t) = Taken By, (c) = Cosigned By      Initials Name Provider Type    Usama Bob PT Physical Therapist                   Obj/Interventions       Row Name 05/14/25 1515          Range of Motion Comprehensive    Comment, General Range of Motion B Shld (Imp. 25%);  BLE (WFL's)  -MS       Row Name 05/14/25 1515          Strength Comprehensive (MMT)    Comment, General Manual Muscle Testing (MMT) Assessment B Shld (3-/5);  BLE (3+/5)  -MS       Row Name 05/14/25 1515          Motor Skills    Therapeutic Exercise --  BLE ther. ex. program x 5 reps completed (Ankle pumps, LAQ's, Hip Flexion)  -MS               User Key  (r) = Recorded By, (t) = Taken By, (c) = Cosigned By      Initials Name Provider Type    Usama Bob, PT Physical Therapist                   Goals/Plan       Row Name 05/14/25 1517          Bed Mobility Goal 1 (PT)    Activity/Assistive Device (Bed Mobility Goal 1, PT) bed mobility activities, all  -MS     [place] Level/Cues Needed (Bed Mobility Goal 1, PT) contact guard required   "-MS     Time Frame (Bed Mobility Goal 1, PT) long term goal (LTG);1 week  -MS       Row Name 05/14/25 1517          Transfer Goal 1 (PT)    Activity/Assistive Device (Transfer Goal 1, PT) transfers, all;walker, rolling  -MS     [place] Level/Cues Needed (Transfer Goal 1, PT) standby assist  -MS     Time Frame (Transfer Goal 1, PT) long term goal (LTG);1 week  -MS       Row Name 05/14/25 1517          Gait Training Goal 1 (PT)    Activity/Assistive Device (Gait Training Goal 1, PT) gait (walking locomotion);walker, rolling  -MS     [place] Level (Gait Training Goal 1, PT) standby assist  -MS     Distance (Gait Training Goal 1, PT) 30 feet  -MS     Time Frame (Gait Training Goal 1, PT) long term goal (LTG);1 week  -MS       Row Name 05/14/25 1517          Therapy Assessment/Plan (PT)    Planned Therapy Interventions (PT) balance training;bed mobility training;gait training;home exercise program;patient/family education;postural re-education;transfer training;strengthening  -MS               User Key  (r) = Recorded By, (t) = Taken By, (c) = Cosigned By      Initials Name Provider Type    MS Usama Martínez, PT Physical Therapist                   Clinical Impression       Row Name 05/14/25 1519          Pain    Pain Location back  -MS     Pain Management Interventions positioning techniques utilized;nursing notified  -MS     Pre/Posttreatment Pain Comment Pt. reports intermittent back pain with certain movements but does not give a specific pain rating.  Pt. does report \"no pain\" when at rest.   -MS       Row Name 05/14/25 1514          Plan of Care Review    Plan of Care Reviewed With patient  -MS       Row Name 05/14/25 1511          Therapy Assessment/Plan (PT)    Rehab Potential (PT) good  -MS     Criteria for Skilled Interventions Met (PT) skilled treatment is necessary  -MS     Therapy Frequency (PT) 6 times/wk  -MS       Row Name 05/14/25 1517          Positioning and Restraints    Pre-Treatment " Position in bed  -MS     Post Treatment Position bed  -MS     In Bed notified nsg;supine;call light within reach;encouraged to call for assist;exit alarm on  Pillow under Right hip/buttock for pressure relief.  All lines intact.  -MS               User Key  (r) = Recorded By, (t) = Taken By, (c) = Cosigned By      Initials Name Provider Type    Usama Bob, PT Physical Therapist                   Outcome Measures       Row Name 05/14/25 1518 05/14/25 0840       How much help from another person do you currently need...    Turning from your back to your side while in flat bed without using bedrails? 2  -MS 2  -VJ    Moving from lying on back to sitting on the side of a flat bed without bedrails? 2  -MS 2  -VJ    Moving to and from a bed to a chair (including a wheelchair)? 3  -MS 2  -VJ    Standing up from a chair using your arms (e.g., wheelchair, bedside chair)? 3  -MS 2  -VJ    Climbing 3-5 steps with a railing? 2  -MS 2  -VJ    To walk in hospital room? 3  -MS 2  -VJ    AM-PAC 6 Clicks Score (PT) 15  -MS 12  -VJ    Highest Level of Mobility Goal Move to Chair/Commode-4  -MS Move to Chair/Commode-4  -VJ      Row Name 05/14/25 1518          Functional Assessment    Outcome Measure Options AM-PAC 6 Clicks Basic Mobility (PT)  -MS               User Key  (r) = Recorded By, (t) = Taken By, (c) = Cosigned By      Initials Name Provider Type    Jesika Connelly RN Registered Nurse    Usama Bob, PT Physical Therapist                                 Physical Therapy Education       Title: PT OT SLP Therapies (Done)       Topic: Physical Therapy (Done)       Point: Mobility training (Done)       Learning Progress Summary            Patient Acceptance, E,D, VU,NR by MS at 5/14/2025 1518                      Point: Home exercise program (Done)       Learning Progress Summary            Patient Acceptance, E,D, VU,NR by MS at 5/14/2025 1518                      Point: Body mechanics (Done)        Learning Progress Summary            Patient Acceptance, E,D, VU,NR by MS at 5/14/2025 1518                      Point: Precautions (Done)       Learning Progress Summary            Patient Acceptance, E,D, VU,NR by MS at 5/14/2025 1518                                      User Key       Initials Effective Dates Name Provider Type Discipline    MS 06/16/21 -  Usama Martínez, PT Physical Therapist PT                  PT Recommendation and Plan  Planned Therapy Interventions (PT): balance training, bed mobility training, gait training, home exercise program, patient/family education, postural re-education, transfer training, strengthening  Outcome Evaluation: Pt. is a 89 year old Male admitted to the hospital with symptomatic anemia.  Pt. reports that prior to admission he was using a Rwx for ambulation and has caregivers that come throughout the week to assist as needed.  Pt. currently presents with decreased strength, decreased balance, and decreased tolerance to functional activity. This PM, pt. able to ambulate 10 feet, CGA x 2, with use of Rwx. Pt. requries Min-Mod. assist x 2 for bed mobility and CGA x 2 for sit <-> stand transfers.  BLE ther. ex. program completed for general strengthening. Verbal/tactile cues given during ambulation for posture correction and Rwx guidance.  Pt. will benefit from skilled inpt. P.T. to address his functional deficits and to assist pt. in regaining his maximum level of independence with functional mobility.     Time Calculation:         PT Charges       Row Name 05/14/25 1521             Time Calculation    Start Time 1340  -MS      Stop Time 1400  -MS      Time Calculation (min) 20 min  -MS      PT Received On 05/14/25  -MS      PT - Next Appointment 05/15/25  -MS      PT Goal Re-Cert Due Date 05/21/25  -MS         Time Calculation- PT    Total Timed Code Minutes- PT 19 minute(s)  -MS                User Key  (r) = Recorded By, (t) = Taken By, (c) = Cosigned By      Initials  Name Provider Type    MS Usama Martínez, PT Physical Therapist                  Therapy Charges for Today       Code Description Service Date Service Provider Modifiers Qty    77416741069 HC PT EVAL MOD COMPLEXITY 3 5/14/2025 Usama Martínez, PT GP 1    08273676732 HC PT THERAPEUTIC ACT EA 15 MIN 5/14/2025 Usama Martínez, PT GP 1    63157907611 HC PT THER SUPP EA 15 MIN 5/14/2025 Usama Martínez, PT GP 1            PT G-Codes  Outcome Measure Options: AM-PAC 6 Clicks Basic Mobility (PT)  AM-PAC 6 Clicks Score (PT): 15  PT Discharge Summary  Anticipated Discharge Disposition (PT): home with 24/7 care, home with home health (Pending pt. progress)    Usama Martínez, PT  5/14/2025

## 2025-05-14 NOTE — PROGRESS NOTES
Name: Sacha Whitehead ADMIT: 2025   : 1930  PCP: Alvina Hauser MD    MRN: [id number] LOS: 1 days   AGE/SEX: 89 y.o. male  ROOM: [facility]     Subjective   Subjective   No signs of bleeding, resting comfortably.    Objective   Objective   Vital Signs  Temp:  [97 °F (36.1 °C)-97.9 °F (36.6 °C)] 97.4 °F (36.3 °C)  Heart Rate:  [57-72] 60  Resp:  [17-18] 17  BP: ()/(40-99) 114/53  SpO2:  [91 %-100 %] 96 %  on   ;   Device (Oxygen Therapy): room air  Body mass index is 31.22 kg/m².  Physical Exam  Constitutional:       Appearance: He is ill-appearing.      Comments: Hard of hearing   Pulmonary:      Effort: Pulmonary effort is normal. No respiratory distress.      Breath sounds: No stridor.   Skin:     Coloration: Skin is pale.      Findings: Bruising present.   Neurological:      General: No focal deficit present.      Mental Status: He is alert.         Results Review     I reviewed the patient's new clinical results.  Results from last 7 days   Lab Units 25  0025 25  1625 25  1044   WBC 10*3/mm3  --   --  4.40   HEMOGLOBIN g/dL 6.8* 6.0* 5.3*   PLATELETS 10*3/mm3  --   --  138*     Results from last 7 days   Lab Units 25  1044   SODIUM mmol/L 145   POTASSIUM mmol/L 4.9   CHLORIDE mmol/L 111*   CO2 mmol/L 22.6   BUN mg/dL 38*   CREATININE mg/dL 1.24   GLUCOSE mg/dL 125*   EGFR mL/min/1.73 53.5*     Results from last 7 days   Lab Units 25  1044   ALBUMIN g/dL 3.5   BILIRUBIN mg/dL 0.5   ALK PHOS U/L 108   AST (SGOT) U/L 21   ALT (SGPT) U/L 20     Results from last 7 days   Lab Units 25  1044   CALCIUM mg/dL 9.1   ALBUMIN g/dL 3.5       Hemoglobin A1C   Date/Time Value Ref Range Status   2025 1044 6.00 (H) 4.80 - 5.60 % Final     Glucose   Date/Time Value Ref Range Status   2025 0613 117 70 - 130 mg/dL Final   2025 2038 191 (H) 70 - 130 mg/dL Final   2025 1520 141 (H) 70 - 130 mg/dL Final       XR Chest 1 View  Result Date: 2025  1.  Cardiomegaly. 2. No acute infiltrates.   This report was finalized on 5/13/2025 11:36 AM by Dr. Fredrick Bennett M.D on Workstation: [id number]      Scheduled Medications  acetaminophen, 650 mg, Oral, Daily  ascorbic acid, 1,000 mg, Oral, Daily  [Held by provider] carvedilol, 25 mg, Oral, BID With Meals  cetirizine, 10 mg, Oral, Daily  ferric gluconate, 250 mg, Intravenous, Daily  folic acid, 1,000 mcg, Oral, Daily  insulin lispro, 2-7 Units, Subcutaneous, 4x Daily AC & at Bedtime  pantoprazole, 40 mg, Oral, QAM AC  vitamin B-12, 1,000 mcg, Oral, Daily    Infusions   Diet  Diet: Cardiac; Healthy Heart (2-3 Na+); Fluid Consistency: Thin (IDDSI 0)       Assessment/Plan     Active Hospital Problems    Diagnosis  POA    **Symptomatic anemia [D64.9]  Yes    Supratherapeutic INR [R79.1]  Yes    Warfarin anticoagulation [Z79.01]  Not Applicable    Sick sinus syndrome [I49.5]  Yes    Essential hypertension [I10]  Yes    Atrial fibrillation [I48.91]  Yes      Resolved Hospital Problems   No resolved problems to display.       89 y.o. male admitted with Symptomatic anemia.      05/14/25  Transfuse additional 1 unit PRBC today.  Start IV iron.  No plans for endoscopic evaluation.    Symptomatic anemia/BILLY/ABLA  -Hb  5.3 OA > 2units PRBC > 6.8 > 1 unit > 7.6  -GI consulted-conservative management discussed with family  -PPI daily  -3d IV iron     Afib  Supratherapeutic INR  S/p PPM  -RC: Coreg  -AC: wafarin- ON HOLD due to above  -INR > 10 OA, s/p vitamin K and KCentra in ED  -Cardiology recommending transition to Eliquis     Elevated troponin  Trop 85 > 61; EKG no acute ischemia  - Likely demand related to severe anemia, not consistent with ACS  - Cardiology following- no acute ischemic eval indicated     DM2 w/ hyperglycemia  -hold home PO meds  -SSI; monitor for hypoglycemia         DVT prophylaxis: Warfarin (home med) NO HOLD  Discussed with patient and nursing staff.  Anticipated discharge Pending PT and/or OT eval., 1-2  days            Farrukh Pedroza MD  [facility]  05/14/25  06:50 EDT

## 2025-05-14 NOTE — CASE MANAGEMENT/SOCIAL WORK
Discharge Planning Assessment  [facility]     Patient Name: Sacha Whitehead  MRN: [id number]  Today's Date: 5/14/2025    Admit Date: 5/13/2025    Plan: Plan home with family.   ALISHA Godoy RN   Discharge Needs Assessment       Row Name 05/14/25 0851       Living Environment    People in Home friend(s)    Name(s) of People in Home Friend ( Francisco Witt)    Current Living Arrangements home    Potentially Unsafe Housing Conditions none    In the past 12 months has the electric, gas, oil, or water company threatened to shut off services in your home? No    Primary Care Provided by self;homecare agency    Provides Primary Care For no one, unable/limited ability to care for self    Family Caregiver if Needed child(bandar), adult;friend(s)    Family Caregiver Names Son ( Vincent Whitehead [phone]) and  Friend ( Francisco Witt)    Quality of Family Relationships helpful;involved;supportive    Able to Return to Prior Arrangements yes    Living Arrangement Comments Pt lives with a friend (Francisco Witt) in a two story house.       Resource/Environmental Concerns    Resource/Environmental Concerns none    Transportation Concerns none       Transportation Needs    In the past 12 months, has lack of transportation kept you from medical appointments or from getting medications? no    In the past 12 months, has lack of transportation kept you from meetings, work, or from getting things needed for daily living? No       Food Insecurity    Within the past 12 months, you worried that your food would run out before you got the money to buy more. Never true    Within the past 12 months, the food you bought just didn't last and you didn't have money to get more. Never true       Transition Planning    Patient/Family Anticipates Transition to home with family    Patient/Family Anticipated Services at Transition none    Transportation Anticipated family or friend will provide       Discharge Needs Assessment    Readmission Within the  Last 30 Days no previous admission in last 30 days    Equipment Currently Used at Home bath bench;cane, straight;grab bar;rollator;other (see comments)  ELECTRIC SCOOTER    Concerns to be Addressed no discharge needs identified;denies needs/concerns at this time    Anticipated Changes Related to Illness none    Equipment Needed After Discharge bath bench;cane, straight;grab bar, tub/shower;rollator;other (see comments)  MOTORIZED SCOOTER                   Discharge Plan       Row Name 05/14/25 0854       Plan    Plan Plan home with family.   ALISHA Godoy RN    Patient/Family in Agreement with Plan yes    Plan Comments FACE SHEET VERIFIED/ IM LETTER SIGNED. Spoke with pt at bedside and he requested CCP call his son (Vincent).  Called Vincent ( [phone]) and spoke with him.  Pt[facility] P[facility] is Dr. [facility] on [address]  Pt requires assistance with ADLs.  Pt has caregivers provided by [facility].  On M-W-F they are there for 2 hours and on Tu-Thur they are there for 3 hours. Pt has a bath bench cane, grab bar, rollator and an electric scooter for home use if needed.  Pt gets his prescriptions at [facility] in [facility]. Pt does not have issues affording medications. Pt is not current with .  Pt has been in [facility] for skilled care.  CCP will follow.  Pt denies any discharge needs at present. Plan home with family.  ALISHA Godoy RN                    Expected Discharge Date and Time       Expected Discharge Date Expected Discharge Time    May 16, 2025            Demographic Summary       Row Name 05/14/25 0801       General Information    Admission Type inpatient    Arrived From emergency department    Required Notices Provided Important Message from Medicare    Referral Source admission list    Reason for Consult discharge planning    Preferred Language English                   Functional Status       Row Name 05/14/25 0863       Functional Status    Usual Activity Tolerance moderate    Current Activity Tolerance  fair       Functional Status, IADL    Medications assistive person    Meal Preparation assistive person    Housekeeping assistive person    Laundry assistive person    Shopping assistive person       Mental Status    General Appearance WDL WDL                   Psychosocial    No documentation.                  Abuse/Neglect    No documentation.                  Legal    No documentation.                  Substance Abuse    No documentation.                  Patient Forms    No documentation.                     Elinor Godoy RN

## 2025-05-15 LAB
ANION GAP SERPL CALCULATED.3IONS-SCNC: 8.9 MMOL/L (ref 5–15)
BASOPHILS # BLD AUTO: 0.02 10*3/MM3 (ref 0–0.2)
BASOPHILS NFR BLD AUTO: 0.4 % (ref 0–1.5)
BUN SERPL-MCNC: 29 MG/DL (ref 8–23)
BUN/CREAT SERPL: 29.3 (ref 7–25)
CALCIUM SPEC-SCNC: 8.9 MG/DL (ref 8.2–9.6)
CHLORIDE SERPL-SCNC: 115 MMOL/L (ref 98–107)
CO2 SERPL-SCNC: 22.1 MMOL/L (ref 22–29)
CREAT SERPL-MCNC: 0.99 MG/DL (ref 0.76–1.27)
DEPRECATED RDW RBC AUTO: 61.7 FL (ref 37–54)
EGFRCR SERPLBLD CKD-EPI 2021: 70.1 ML/MIN/1.73
EOSINOPHIL # BLD AUTO: 0.11 10*3/MM3 (ref 0–0.4)
EOSINOPHIL NFR BLD AUTO: 2 % (ref 0.3–6.2)
ERYTHROCYTE [DISTWIDTH] IN BLOOD BY AUTOMATED COUNT: 17.4 % (ref 12.3–15.4)
GLUCOSE BLDC GLUCOMTR-MCNC: 105 MG/DL (ref 70–130)
GLUCOSE BLDC GLUCOMTR-MCNC: 139 MG/DL (ref 70–130)
GLUCOSE BLDC GLUCOMTR-MCNC: 150 MG/DL (ref 70–130)
GLUCOSE BLDC GLUCOMTR-MCNC: 240 MG/DL (ref 70–130)
GLUCOSE SERPL-MCNC: 107 MG/DL (ref 65–99)
HCT VFR BLD AUTO: 24.8 % (ref 37.5–51)
HGB BLD-MCNC: 7.5 G/DL (ref 13–17.7)
IMM GRANULOCYTES # BLD AUTO: 0.04 10*3/MM3 (ref 0–0.05)
IMM GRANULOCYTES NFR BLD AUTO: 0.7 % (ref 0–0.5)
INR PPP: 1.37 (ref 0.9–1.1)
LYMPHOCYTES # BLD AUTO: 0.59 10*3/MM3 (ref 0.7–3.1)
LYMPHOCYTES NFR BLD AUTO: 10.9 % (ref 19.6–45.3)
MCH RBC QN AUTO: 29.8 PG (ref 26.6–33)
MCHC RBC AUTO-ENTMCNC: 30.2 G/DL (ref 31.5–35.7)
MCV RBC AUTO: 98.4 FL (ref 79–97)
MONOCYTES # BLD AUTO: 0.84 10*3/MM3 (ref 0.1–0.9)
MONOCYTES NFR BLD AUTO: 15.6 % (ref 5–12)
NEUTROPHILS NFR BLD AUTO: 3.8 10*3/MM3 (ref 1.7–7)
NEUTROPHILS NFR BLD AUTO: 70.4 % (ref 42.7–76)
NRBC BLD AUTO-RTO: 1.3 /100 WBC (ref 0–0.2)
PLATELET # BLD AUTO: 112 10*3/MM3 (ref 140–450)
PMV BLD AUTO: 11.1 FL (ref 6–12)
POTASSIUM SERPL-SCNC: 4.4 MMOL/L (ref 3.5–5.2)
PROTHROMBIN TIME: 16.9 SECONDS (ref 11.7–14.2)
RBC # BLD AUTO: 2.52 10*6/MM3 (ref 4.14–5.8)
SODIUM SERPL-SCNC: 146 MMOL/L (ref 136–145)
WBC NRBC COR # BLD AUTO: 5.4 10*3/MM3 (ref 3.4–10.8)

## 2025-05-15 PROCEDURE — 25010000002 FUROSEMIDE PER 20 MG: Performed by: STUDENT IN AN ORGANIZED HEALTH CARE EDUCATION/TRAINING PROGRAM

## 2025-05-15 PROCEDURE — 25810000003 SODIUM CHLORIDE 0.9 % SOLUTION: Performed by: STUDENT IN AN ORGANIZED HEALTH CARE EDUCATION/TRAINING PROGRAM

## 2025-05-15 PROCEDURE — 63710000001 INSULIN LISPRO (HUMAN) PER 5 UNITS: Performed by: STUDENT IN AN ORGANIZED HEALTH CARE EDUCATION/TRAINING PROGRAM

## 2025-05-15 PROCEDURE — 85025 COMPLETE CBC W/AUTO DIFF WBC: CPT | Performed by: STUDENT IN AN ORGANIZED HEALTH CARE EDUCATION/TRAINING PROGRAM

## 2025-05-15 PROCEDURE — 80048 BASIC METABOLIC PNL TOTAL CA: CPT | Performed by: STUDENT IN AN ORGANIZED HEALTH CARE EDUCATION/TRAINING PROGRAM

## 2025-05-15 PROCEDURE — 36415 COLL VENOUS BLD VENIPUNCTURE: CPT | Performed by: STUDENT IN AN ORGANIZED HEALTH CARE EDUCATION/TRAINING PROGRAM

## 2025-05-15 PROCEDURE — 25010000002 NA FERRIC GLUC CPLX PER 12.5 MG: Performed by: STUDENT IN AN ORGANIZED HEALTH CARE EDUCATION/TRAINING PROGRAM

## 2025-05-15 PROCEDURE — 85610 PROTHROMBIN TIME: CPT | Performed by: STUDENT IN AN ORGANIZED HEALTH CARE EDUCATION/TRAINING PROGRAM

## 2025-05-15 PROCEDURE — 82948 REAGENT STRIP/BLOOD GLUCOSE: CPT

## 2025-05-15 RX ADMIN — Medication 1000 MCG: at 08:35

## 2025-05-15 RX ADMIN — FUROSEMIDE 40 MG: 10 INJECTION, SOLUTION INTRAMUSCULAR; INTRAVENOUS at 06:30

## 2025-05-15 RX ADMIN — APIXABAN 5 MG: 5 TABLET, FILM COATED ORAL at 20:50

## 2025-05-15 RX ADMIN — INSULIN LISPRO 3 UNITS: 100 INJECTION, SOLUTION INTRAVENOUS; SUBCUTANEOUS at 12:03

## 2025-05-15 RX ADMIN — FOLIC ACID 1000 MCG: 1 TABLET ORAL at 08:35

## 2025-05-15 RX ADMIN — OXYCODONE HYDROCHLORIDE AND ACETAMINOPHEN 1000 MG: 500 TABLET ORAL at 08:35

## 2025-05-15 RX ADMIN — ACETAMINOPHEN 650 MG: 325 TABLET, FILM COATED ORAL at 08:35

## 2025-05-15 RX ADMIN — CARVEDILOL 6.25 MG: 6.25 TABLET, FILM COATED ORAL at 17:56

## 2025-05-15 RX ADMIN — SODIUM CHLORIDE 250 MG: 9 INJECTION, SOLUTION INTRAVENOUS at 09:19

## 2025-05-15 RX ADMIN — CARVEDILOL 6.25 MG: 6.25 TABLET, FILM COATED ORAL at 08:35

## 2025-05-15 RX ADMIN — PANTOPRAZOLE SODIUM 40 MG: 40 TABLET, DELAYED RELEASE ORAL at 06:30

## 2025-05-15 RX ADMIN — INSULIN LISPRO 2 UNITS: 100 INJECTION, SOLUTION INTRAVENOUS; SUBCUTANEOUS at 08:35

## 2025-05-15 RX ADMIN — APIXABAN 5 MG: 5 TABLET, FILM COATED ORAL at 08:35

## 2025-05-15 RX ADMIN — CETIRIZINE HYDROCHLORIDE 10 MG: 10 TABLET, FILM COATED ORAL at 08:35

## 2025-05-15 NOTE — PLAN OF CARE
Goal Outcome Evaluation:         Patient up in the chair resting.Alert and oriented.VSS.Room air.No s/s of active bleeding.Plan of care ongoing

## 2025-05-15 NOTE — PROGRESS NOTES
Name: Sacha Whitehead ADMIT: 2025   : 1930  PCP: Alvina Hauser MD    MRN: [id number] LOS: 2 days   AGE/SEX: 89 y.o. male  ROOM: [facility]     Subjective   Subjective   Sitting up in chair, no bleeding.  No new problems.    Objective   Objective   Vital Signs  Temp:  [98.1 °F (36.7 °C)-98.4 °F (36.9 °C)] 98.4 °F (36.9 °C)  Heart Rate:  [61-80] 67  Resp:  [17-18] 17  BP: (120-127)/(54-78) 120/57  SpO2:  [96 %-97 %] 96 %  on   ;   Device (Oxygen Therapy): room air  Body mass index is 31.06 kg/m².  Physical Exam  Constitutional:       Appearance: He is ill-appearing.      Comments: Hard of hearing   Pulmonary:      Effort: Pulmonary effort is normal. No respiratory distress.      Breath sounds: No stridor.   Skin:     Coloration: Skin is pale.      Findings: Bruising present.   Neurological:      General: No focal deficit present.      Mental Status: He is alert.         Results Review     I reviewed the patient's new clinical results.  Results from last 7 days   Lab Units 05/15/25  0545 25  0823 25  0025 25  1625 25  1044   WBC 10*3/mm3 5.40 4.29  --   --  4.40   HEMOGLOBIN g/dL 7.5* 7.6* 6.8* 6.0* 5.3*   PLATELETS 10*3/mm3 112* 130*  --   --  138*     Results from last 7 days   Lab Units 25  0823 25  1044   SODIUM mmol/L 143 145   POTASSIUM mmol/L 4.3 4.9   CHLORIDE mmol/L 110* 111*   CO2 mmol/L 23.0 22.6   BUN mg/dL 33* 38*   CREATININE mg/dL 1.16 1.24   GLUCOSE mg/dL 114* 125*   EGFR mL/min/1.73 58.0* 53.5*     Results from last 7 days   Lab Units 25  1044   ALBUMIN g/dL 3.5   BILIRUBIN mg/dL 0.5   ALK PHOS U/L 108   AST (SGOT) U/L 21   ALT (SGPT) U/L 20     Results from last 7 days   Lab Units 25  0823 25  1044   CALCIUM mg/dL 9.0 9.1   ALBUMIN g/dL  --  3.5       Hemoglobin A1C   Date/Time Value Ref Range Status   2025 1044 6.00 (H) 4.80 - 5.60 % Final     Glucose   Date/Time Value Ref Range Status   05/15/2025 0555 150 (H) 70 - 130 mg/dL  Final   05/14/2025 2033 173 (H) 70 - 130 mg/dL Final   05/14/2025 1535 112 70 - 130 mg/dL Final   05/14/2025 1027 201 (H) 70 - 130 mg/dL Final   05/14/2025 0613 117 70 - 130 mg/dL Final   05/13/2025 2038 191 (H) 70 - 130 mg/dL Final   05/13/2025 1520 141 (H) 70 - 130 mg/dL Final       XR Chest 1 View  Result Date: 5/13/2025  1. Cardiomegaly. 2. No acute infiltrates.   This report was finalized on 5/13/2025 11:36 AM by Dr. Fredrick Bennett M.D on Workstation: [id number]      Scheduled Medications  acetaminophen, 650 mg, Oral, Daily  apixaban, 5 mg, Oral, Q12H  ascorbic acid, 1,000 mg, Oral, Daily  carvedilol, 6.25 mg, Oral, BID With Meals  cetirizine, 10 mg, Oral, Daily  ferric gluconate, 250 mg, Intravenous, Daily  folic acid, 1,000 mcg, Oral, Daily  furosemide, 40 mg, Intravenous, QAM  insulin lispro, 2-7 Units, Subcutaneous, 4x Daily AC & at Bedtime  pantoprazole, 40 mg, Oral, QAM AC  vitamin B-12, 1,000 mcg, Oral, Daily    Infusions   Diet  Diet: Cardiac; Healthy Heart (2-3 Na+); Fluid Consistency: Thin (IDDSI 0)       Assessment/Plan     Active Hospital Problems    Diagnosis  POA    **Symptomatic anemia [D64.9]  Yes    Supratherapeutic INR [R79.1]  Yes    Warfarin anticoagulation [Z79.01]  Not Applicable    Sick sinus syndrome [I49.5]  Yes    Essential hypertension [I10]  Yes    Atrial fibrillation [I48.91]  Yes      Resolved Hospital Problems   No resolved problems to display.       89 y.o. male admitted with Symptomatic anemia.      05/15/25  Will resume anticoagulation (switched to Eliquis).  Continue IV iron for 3 days.    Symptomatic anemia/BILLY/ABLA  -Hb  5.3 OA > 2units PRBC > 6.8 > 1 unit > 7.6  -GI consulted-conservative management discussed with family  -PPI daily  -3d IV iron     Afib  Supratherapeutic INR, resolved  S/p PPM  -RC: Coreg  -AC: wafarin switched to Eliquis per Cardiology  -INR > 10 OA, s/p vitamin K and KCentra in ED     Elevated troponin  Trop 85 > 61; EKG no acute ischemia  - Likely  demand related to severe anemia, not consistent with ACS  - Cardiology following- no acute ischemic eval indicated     DM2 w/ hyperglycemia  -hold home PO meds  -SSI; monitor for hypoglycemia         DVT prophylaxis: Eliquis (home med)   Discussed with patient and nursing staff.  Anticipated discharge home with HH, 1-2 days            Farrukh Pedroza MD  [facility]  05/15/25  06:29 EDT

## 2025-05-15 NOTE — PROGRESS NOTES
"Nutrition Services    Patient Name: Sacha Whitehead  YOB: 1930  MRN: [id number]  Admission date: 5/13/2025    Assessment Date:  05/15/25    NUTRITION EVALUATION      Reason for Encounter Pressure Injury and/or Non-Healing Wound   Diagnosis/Problem Admission Diagnosis:  Symptomatic anemia [D64.9]  Gastrointestinal hemorrhage, unspecified gastrointestinal hemorrhage type [K92.2]  Toxic effect of warfarin, unintentional, initial encounter [T45.862A]    Problem List:    Symptomatic anemia    Atrial fibrillation    Essential hypertension    Sick sinus syndrome    Warfarin anticoagulation    Supratherapeutic INR     Narrative Symptomatic anemia       PO Diet Diet: Cardiac; Healthy Heart (2-3 Na+); Fluid Consistency: Thin (IDDSI 0)   Allergies NKFA   Supplements n/a   PO Intake % 100%       Chewing/Swallowing Difficulty no issues identified at this time       Medications reviewed   Labs  reviewed Na 146,       Physical Findings alert, obese     Edema 3+ (moderate)    GI Function last bowel movement: 5/14   Skin Status Gluteal, rifght lower leg    Lines/Drains none   I/O reviewed        Height  Weight  BMI  Weight Trend     Height: 177.8 cm (70\")  Weight: 98.2 kg (216 lb 7.9 oz) (05/15/25 0340)  Body mass index is 31.06 kg/m².  Stable    Weight change: No significant changes       NFPE Not indicated at this time       Nutrition Problem (PES) Problem: Nutrition Appropriate for Condition at this Time  Etiology: Medical Diagnosis - anemia    Signs/Symptoms: Report/Observation       Intervention/Plan Will offer high protein snacks/supplement for wound healing    RD to follow up per protocol.     Results from last 7 days   Lab Units 05/15/25  0545 05/14/25  0823 05/13/25  1044   SODIUM mmol/L 146* 143 145   POTASSIUM mmol/L 4.4 4.3 4.9   CHLORIDE mmol/L 115* 110* 111*   CO2 mmol/L 22.1 23.0 22.6   BUN mg/dL 29* 33* 38*   CREATININE mg/dL 0.99 1.16 1.24   CALCIUM mg/dL 8.9 9.0 9.1   BILIRUBIN mg/dL  --   --  0.5   ALK " PHOS U/L  --   --  108   ALT (SGPT) U/L  --   --  20   AST (SGOT) U/L  --   --  21   GLUCOSE mg/dL 107* 114* 125*     Results from last 7 days   Lab Units 05/15/25  0545   HEMOGLOBIN g/dL 7.5*   HEMATOCRIT % 24.8*     Lab Results   Component Value Date    HGBA1C 6.00 (H) 05/13/2025     Wt Readings from Last 10 Encounters:   05/15/25 98.2 kg (216 lb 7.9 oz)   10/17/24 98.7 kg (217 lb 9.6 oz)   08/07/23 98.4 kg (217 lb)   01/06/23 99.8 kg (220 lb)   06/29/22 99.8 kg (220 lb)   01/18/22 103 kg (226 lb)   12/29/21 102 kg (225 lb)   07/16/21 106 kg (234 lb)   04/15/21 108 kg (238 lb)   07/31/20 108 kg (238 lb 4.8 oz)       Electronically signed by:  Natalie Gomez RD  05/15/25 15:48 EDT

## 2025-05-15 NOTE — PLAN OF CARE
Goal Outcome Evaluation:                 Patient alert and oriented, very [place]. Patient up in chair all day. Patient did stand up and sit back down several times to stretch his back out. Patient tolerated well. Ferric gluconate administered this am - see MAR. No acute distress noted, will continue to monitor.

## 2025-05-16 LAB
ANION GAP SERPL CALCULATED.3IONS-SCNC: 10 MMOL/L (ref 5–15)
ANISOCYTOSIS BLD QL: ABNORMAL
BASOPHILS # BLD MANUAL: 0 10*3/MM3 (ref 0–0.2)
BASOPHILS NFR BLD MANUAL: 0 % (ref 0–1.5)
BUN SERPL-MCNC: 26 MG/DL (ref 8–23)
BUN/CREAT SERPL: 23.6 (ref 7–25)
CALCIUM SPEC-SCNC: 8.8 MG/DL (ref 8.2–9.6)
CHLORIDE SERPL-SCNC: 108 MMOL/L (ref 98–107)
CO2 SERPL-SCNC: 24 MMOL/L (ref 22–29)
CREAT SERPL-MCNC: 1.1 MG/DL (ref 0.76–1.27)
DEPRECATED RDW RBC AUTO: 62.9 FL (ref 37–54)
EGFRCR SERPLBLD CKD-EPI 2021: 61.8 ML/MIN/1.73
ELLIPTOCYTES BLD QL SMEAR: ABNORMAL
EOSINOPHIL # BLD MANUAL: 0.11 10*3/MM3 (ref 0–0.4)
EOSINOPHIL NFR BLD MANUAL: 2 % (ref 0.3–6.2)
ERYTHROCYTE [DISTWIDTH] IN BLOOD BY AUTOMATED COUNT: 17.4 % (ref 12.3–15.4)
GLUCOSE BLDC GLUCOMTR-MCNC: 121 MG/DL (ref 70–130)
GLUCOSE BLDC GLUCOMTR-MCNC: 132 MG/DL (ref 70–130)
GLUCOSE BLDC GLUCOMTR-MCNC: 171 MG/DL (ref 70–130)
GLUCOSE BLDC GLUCOMTR-MCNC: 188 MG/DL (ref 70–130)
GLUCOSE SERPL-MCNC: 145 MG/DL (ref 65–99)
HCT VFR BLD AUTO: 24.7 % (ref 37.5–51)
HGB BLD-MCNC: 7.3 G/DL (ref 13–17.7)
HYPOCHROMIA BLD QL: ABNORMAL
INR PPP: 2.58 (ref 0.9–1.1)
LYMPHOCYTES # BLD MANUAL: 1.07 10*3/MM3 (ref 0.7–3.1)
LYMPHOCYTES NFR BLD MANUAL: 3 % (ref 5–12)
MACROCYTES BLD QL SMEAR: ABNORMAL
MCH RBC QN AUTO: 29.2 PG (ref 26.6–33)
MCHC RBC AUTO-ENTMCNC: 29.6 G/DL (ref 31.5–35.7)
MCV RBC AUTO: 98.8 FL (ref 79–97)
MONOCYTES # BLD: 0.16 10*3/MM3 (ref 0.1–0.9)
NEUTROPHILS # BLD AUTO: 3.95 10*3/MM3 (ref 1.7–7)
NEUTROPHILS NFR BLD MANUAL: 74.7 % (ref 42.7–76)
NRBC BLD AUTO-RTO: 2.3 /100 WBC (ref 0–0.2)
OVALOCYTES BLD QL SMEAR: ABNORMAL
PLAT MORPH BLD: NORMAL
PLATELET # BLD AUTO: 136 10*3/MM3 (ref 140–450)
PMV BLD AUTO: 11.1 FL (ref 6–12)
POIKILOCYTOSIS BLD QL SMEAR: ABNORMAL
POLYCHROMASIA BLD QL SMEAR: ABNORMAL
POTASSIUM SERPL-SCNC: 3.8 MMOL/L (ref 3.5–5.2)
PROTHROMBIN TIME: 28 SECONDS (ref 11.7–14.2)
RBC # BLD AUTO: 2.5 10*6/MM3 (ref 4.14–5.8)
SODIUM SERPL-SCNC: 142 MMOL/L (ref 136–145)
VARIANT LYMPHS NFR BLD MANUAL: 20.2 % (ref 19.6–45.3)
WBC MORPH BLD: NORMAL
WBC NRBC COR # BLD AUTO: 5.29 10*3/MM3 (ref 3.4–10.8)

## 2025-05-16 PROCEDURE — 85007 BL SMEAR W/DIFF WBC COUNT: CPT | Performed by: STUDENT IN AN ORGANIZED HEALTH CARE EDUCATION/TRAINING PROGRAM

## 2025-05-16 PROCEDURE — 25810000003 SODIUM CHLORIDE 0.9 % SOLUTION: Performed by: STUDENT IN AN ORGANIZED HEALTH CARE EDUCATION/TRAINING PROGRAM

## 2025-05-16 PROCEDURE — 85025 COMPLETE CBC W/AUTO DIFF WBC: CPT | Performed by: STUDENT IN AN ORGANIZED HEALTH CARE EDUCATION/TRAINING PROGRAM

## 2025-05-16 PROCEDURE — 63710000001 INSULIN LISPRO (HUMAN) PER 5 UNITS: Performed by: STUDENT IN AN ORGANIZED HEALTH CARE EDUCATION/TRAINING PROGRAM

## 2025-05-16 PROCEDURE — 25010000002 NA FERRIC GLUC CPLX PER 12.5 MG: Performed by: STUDENT IN AN ORGANIZED HEALTH CARE EDUCATION/TRAINING PROGRAM

## 2025-05-16 PROCEDURE — 97110 THERAPEUTIC EXERCISES: CPT

## 2025-05-16 PROCEDURE — 80048 BASIC METABOLIC PNL TOTAL CA: CPT | Performed by: STUDENT IN AN ORGANIZED HEALTH CARE EDUCATION/TRAINING PROGRAM

## 2025-05-16 PROCEDURE — 82948 REAGENT STRIP/BLOOD GLUCOSE: CPT

## 2025-05-16 PROCEDURE — 85610 PROTHROMBIN TIME: CPT | Performed by: STUDENT IN AN ORGANIZED HEALTH CARE EDUCATION/TRAINING PROGRAM

## 2025-05-16 PROCEDURE — 25010000002 FUROSEMIDE PER 20 MG: Performed by: STUDENT IN AN ORGANIZED HEALTH CARE EDUCATION/TRAINING PROGRAM

## 2025-05-16 RX ADMIN — OXYCODONE HYDROCHLORIDE AND ACETAMINOPHEN 1000 MG: 500 TABLET ORAL at 09:12

## 2025-05-16 RX ADMIN — FUROSEMIDE 40 MG: 10 INJECTION, SOLUTION INTRAMUSCULAR; INTRAVENOUS at 06:55

## 2025-05-16 RX ADMIN — CETIRIZINE HYDROCHLORIDE 10 MG: 10 TABLET, FILM COATED ORAL at 09:12

## 2025-05-16 RX ADMIN — FOLIC ACID 1000 MCG: 1 TABLET ORAL at 09:12

## 2025-05-16 RX ADMIN — CARVEDILOL 6.25 MG: 6.25 TABLET, FILM COATED ORAL at 09:15

## 2025-05-16 RX ADMIN — INSULIN LISPRO 2 UNITS: 100 INJECTION, SOLUTION INTRAVENOUS; SUBCUTANEOUS at 11:55

## 2025-05-16 RX ADMIN — APIXABAN 5 MG: 5 TABLET, FILM COATED ORAL at 21:51

## 2025-05-16 RX ADMIN — ACETAMINOPHEN 650 MG: 325 TABLET, FILM COATED ORAL at 09:12

## 2025-05-16 RX ADMIN — INSULIN LISPRO 2 UNITS: 100 INJECTION, SOLUTION INTRAVENOUS; SUBCUTANEOUS at 17:31

## 2025-05-16 RX ADMIN — Medication 1000 MCG: at 09:12

## 2025-05-16 RX ADMIN — SODIUM CHLORIDE 250 MG: 9 INJECTION, SOLUTION INTRAVENOUS at 09:11

## 2025-05-16 RX ADMIN — PANTOPRAZOLE SODIUM 40 MG: 40 TABLET, DELAYED RELEASE ORAL at 06:55

## 2025-05-16 RX ADMIN — APIXABAN 5 MG: 5 TABLET, FILM COATED ORAL at 09:12

## 2025-05-16 RX ADMIN — CARVEDILOL 6.25 MG: 6.25 TABLET, FILM COATED ORAL at 17:31

## 2025-05-16 NOTE — SIGNIFICANT NOTE
05/16/25 1315   OTHER   Discipline occupational therapist   Rehab Time/Intention   Session Not Performed other (see comments)  (Patient observed in bed resting, once awaken, stating he is too comfortable to move and wanting f/u later. OT will f/u later if time allows or next service date.)   Therapy Assessment/Plan (PT)   Criteria for Skilled Interventions Met (PT) skilled treatment is necessary;yes   Recommendation   OT - Next Appointment 05/20/25

## 2025-05-16 NOTE — PLAN OF CARE
Goal Outcome Evaluation:  Patient alert and oriented. RA. VSS. Hard of hearing. Medicated per MAR. Iron infusion given. Incontinence care provided. Plan of care ongoing.

## 2025-05-16 NOTE — CASE MANAGEMENT/SOCIAL WORK
Continued Stay Note  [facility]     Patient Name: Sacha Whitehead  MRN: [id number]  Today's Date: 5/16/2025    Admit Date: 5/13/2025    Plan: Plan home with family or skilled care at accepting facility- pre cert needed.   ALISHA Godoy RN   Discharge Plan       Row Name 05/16/25 1308       Plan    Plan Plan home with family or skilled care at accepting facility- pre cert needed.   ALISHA Godoy RN    Plan Comments Called  pt's son (Vincent [phone]) and spoke with him regarding discharge planning.  Per PT pt may require  skilled care at discharge.  Provided son list of facilities and his choice is [facility]  ( [phone]) called to follow.  Pt will need pre cert.  Plan home with family or skilled care at accepting facility- pre cert needed. ALISHA Godoy RN                   Discharge Codes    No documentation.                 Expected Discharge Date and Time       Expected Discharge Date Expected Discharge Time    May 16, 2025               Elinor Godoy RN

## 2025-05-16 NOTE — THERAPY TREATMENT NOTE
Patient Name: Sacha Whitehaed  : 1930    MRN: [id number]                              Today's Date: 2025       Admit Date: 2025    Visit Dx:     ICD-10-CM ICD-9-CM   1. Symptomatic anemia  D64.9 285.9   2. Gastrointestinal hemorrhage, unspecified gastrointestinal hemorrhage type  K92.2 578.9   3. Toxic effect of warfarin, unintentional, initial encounter  T45.511A 964.2     E858.2     Patient Active Problem List   Diagnosis    Atrial fibrillation    Diabetes type 2, controlled    Cerebrovascular accident (CVA) due to embolism of cerebral artery    Transient insomnia    Aneurysm of aortic root    Vascular disorder    Benign non-nodular prostatic hyperplasia with lower urinary tract symptoms    Gastroesophageal reflux disease    Essential hypertension    Memory loss    Microalbuminuria    Mitral valve insufficiency    Hypertrophic obstructive cardiomyopathy    Obstructive sleep apnea syndrome    Onychomycosis of toenail    Renal artery stenosis    Seizure disorder    Sick sinus syndrome    Tubular adenoma of colon    Vitamin D deficiency    SCC (squamous cell carcinoma), face    Thoracic aortic aneurysm without rupture    Warfarin anticoagulation    Melanoma    Prostate cancer    Bilateral hearing loss    Elevated blood uric acid level    Actinic keratosis    Nonrheumatic aortic valve insufficiency    History of stroke    Symptomatic anemia    Supratherapeutic INR     Past Medical History:   Diagnosis Date    Anemia     Aneurysm of abdominal aorta     Dr. Red    Aneurysm of aortic root     Dr. Red    Angiectasia     Bleeding in stomach; EGD 2014    Aortic valve insufficiency     Arthritis     Osteoarthritis    Atrial fibrillation     Permanent, per Dr. Red; on long-term Coumadin anticoagulation since around .    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    Enlarged prostate without lower urinary tract symptoms (luts)     Esophageal reflux     Facial droop     Right sided    Fatigue     Gastrointestinal hemorrhage 3/10/2016    Description: with angioectasia stomach 2013    Gout     H/O concentric left ventricular hypertrophy (LVH)     LVEF 50% documented on 06/14/2013 by echocardiogram.     Hx of being hospitalized     On 12/11/2013 with hemoglobin of 6 and heme-positive stool. EGD showed a telangiectasia which was lasered by Dr. Carolina Servin.     Hx of being hospitalized     On 04/04/2014 for surgical resection of bleeding at the anastomotic site in colon. Had surgical re-resection and discharged home.    Hx of being hospitalized     On 05/19/2014 with severe anemia.     Hx of being hospitalized     On 12/15/2014 with further resection of sites of possible blood loss based on angiography of the abdominal masses.     Hx of being hospitalized     At [facility] with a grand mal seizure and started on Keppra in 03/2015    Hypertension     Liver enzyme elevation 2/8/2019    Melanoma 8/28/2017    dx'd 2017     Memory loss     Microalbuminuria     Mitral regurgitation     Obstructive hypertrophic cardiomyopathy     Per Dr. Red    HUMAIRA on CPAP     Partial epilepsy with impairment of consciousness 5/13/2015    Overview:  2015 IMO UPDATE  Overview:  2015 IMO UPDATE    Peptic ulcer     On EGD 2014    PND (paroxysmal nocturnal dyspnea)     Reading comprehension disorder     Renal artery stenosis     Seizures     Strarted 3/2015; admitted inpt [place]    Sick sinus syndrome     With hx non-sustained Vtach s/p pacer and AICD    Stroke     Occipital stroke with vision loss in 07/2014.    Transient cerebral ischemia     Embolic    Tubular adenoma of colon     Transverse colon, s/p resection (surgical), cause of severe FE  deficiency anemia.    Ventricular tachycardia     Vitamin D deficiency      Past Surgical History:   Procedure Laterality Date    CARDIAC DEFIBRILLATOR PLACEMENT      CARDIAC ELECTROPHYSIOLOGY PROCEDURE N/A 10/28/2016    Procedure: ICD battery change;  Surgeon: Nael Marcial MD;  Location: [facility] CATH INVASIVE LOCATION;  Service:     CARDIAC PACEMAKER PLACEMENT      Pacemaker Permanent Placement    COLON SURGERY      Laparoscopy Partial Colectomy - Right, dx of tubulovillous adenoma    ENDOSCOPY N/A 8/29/2017    Procedure: ESOPHAGOGASTRODUODENOSCOPY WITH FOREIGN BODY REMOVAL;  Surgeon: Lorena Spencer MD;  Location: [facility] ENDOSCOPY;  Service:     ENDOSCOPY AND COLONOSCOPY      Showing tumor in the transverse colon.     ENDOSCOPY AND COLONOSCOPY  07/02/2014    EGD negative; colonoscopy with an anastomotic ulcer that was over sewn and cauterized.     EYE SURGERY      TOTAL KNEE ARTHROPLASTY Bilateral 2006      General Information       Row Name 05/16/25 1520          Physical Therapy Time and Intention    Document Type therapy note (daily note)  -MS     Mode of Treatment physical therapy;individual therapy  -MS       Row Name 05/16/25 1526          General Information    Patient Profile Reviewed yes  -MS     Existing Precautions/Restrictions fall   Exit alarm  -MS     Barriers to Rehab hearing deficit   -MS       Row Name 05/16/25 1524          Cognition    Orientation Status (Cognition) oriented to;person;place  -MS               User Key  (r) = Recorded By, (t) = Taken By, (c) = Cosigned By      Initials Name Provider Type    MS Tanya Usama EVANGELISTA, PT Physical Therapist                   Mobility       Row Name 05/16/25 1524          Bed Mobility    Comment, (Bed Mobility) Pt. adamantly declines attempts to get out of bed this date reporting increased fatigue and weakness.  Verbal encouragement given but pt. only agreeable to bed ther. ex. program this date.   -MS               User Key  (r) = Recorded By, (t) =  Taken By, (c) = Cosigned By      Initials Name Provider Type    Usama Bob, PT Physical Therapist                   Obj/Interventions       Row Name 05/16/25 1521          Motor Skills    Therapeutic Exercise --  BUE/LE ther. ex. program x 10 reps completed (Ankle pumps, Heel Slides, Hip Abduction, SLR's, Shld Flexion);  Encouraged pt. to continue these ther. ex. on his own throughout the day as well.  -MS               User Key  (r) = Recorded By, (t) = Taken By, (c) = Cosigned By      Initials Name Provider Type    Usama Bob, PT Physical Therapist                   Goals/Plan    No documentation.                  Clinical Impression       Row Name 05/16/25 1521          Pain    Pretreatment Pain Rating 5/10  -MS     Posttreatment Pain Rating 3/10  -MS     Pain Location back  -MS     Pain Management Interventions nursing notified;positioning techniques utilized  -MS       Row Name 05/16/25 1521          Positioning and Restraints    Pre-Treatment Position in bed  -MS     Post Treatment Position bed  -MS     In Bed notified nsg;supine;call light within reach;encouraged to call for assist;exit alarm on;R heel elevated;L heel elevated  All lines intact.  -MS               User Key  (r) = Recorded By, (t) = Taken By, (c) = Cosigned By      Initials Name Provider Type    Usama Bob, PT Physical Therapist                   Outcome Measures       Row Name 05/16/25 1522          How much help from another person do you currently need...    Turning from your back to your side while in flat bed without using bedrails? 2  -MS     Moving from lying on back to sitting on the side of a flat bed without bedrails? 2  -MS     Moving to and from a bed to a chair (including a wheelchair)? 2  -MS     Standing up from a chair using your arms (e.g., wheelchair, bedside chair)? 2  -MS     Climbing 3-5 steps with a railing? 2  -MS     To walk in hospital room? 2  -MS     AM-PAC 6 Clicks Score (PT) 12  -MS      Highest Level of Mobility Goal Move to Chair/Commode-4  -MS       Row Name 05/16/25 1522          Functional Assessment    Outcome Measure Options AM-PAC 6 Clicks Basic Mobility (PT)  -MS               User Key  (r) = Recorded By, (t) = Taken By, (c) = Cosigned By      Initials Name Provider Type    MS MartínezUsama, PT Physical Therapist                                 Physical Therapy Education       Title: PT OT SLP Therapies (In Progress)       Topic: Physical Therapy (In Progress)       Point: Mobility training (Done)       Learning Progress Summary            Patient Acceptance, E,D, VU,NR by MS at 5/14/2025 1518                      Point: Home exercise program (In Progress)       Learning Progress Summary            Patient Acceptance, E,D, NR by MS at 5/16/2025 1522    Acceptance, E,D, VU,NR by MS at 5/14/2025 1518                      Point: Body mechanics (Done)       Learning Progress Summary            Patient Acceptance, E,D, VU,NR by MS at 5/14/2025 1518                      Point: Precautions (Done)       Learning Progress Summary            Patient Acceptance, E,D, VU,NR by MS at 5/14/2025 1518                                      User Key       Initials Effective Dates Name Provider Type Discipline    MS 06/16/21 -  MartínezUsama PT Physical Therapist PT                  PT Recommendation and Plan  Planned Therapy Interventions (PT): balance training, bed mobility training, gait training, home exercise program, patient/family education, postural re-education, transfer training, strengthening  Outcome Evaluation: Upon entering room, pt. supine in bed, initially asleep, but awakens with tactile stimuli.  Pt. declines attempts to get out of bed for ambulation despite verbal encouragement and education on importance of mobility.  With additional encouragement, pt. was agreeable to bed ther. ex. program only this date reporting he is too fatigued to get out of bed.  BUE/LE ther. ex. program  x 10 reps completed for general strengthening.  Encouraged pt. to continue these ther. ex. on his own throughout the day as well. Will continue to progress functional mobility as tolerated.     Time Calculation:         PT Charges       Row Name 05/16/25 1528             Time Calculation    Start Time 1420  -MS      Stop Time 1435  -MS      Time Calculation (min) 15 min  -MS      PT Received On 05/16/25  -MS      PT - Next Appointment 05/17/25  -MS         Time Calculation- PT    Total Timed Code Minutes- PT 14 minute(s)  -MS                User Key  (r) = Recorded By, (t) = Taken By, (c) = Cosigned By      Initials Name Provider Type    Usama Bob, PT Physical Therapist                  Therapy Charges for Today       Code Description Service Date Service Provider Modifiers Qty    [id number] HC PT THER PROC EA 15 MIN 5/16/2025 Usama Martínez, PT GP 1            PT G-Codes  Outcome Measure Options: AM-PAC 6 Clicks Basic Mobility (PT)  AM-PAC 6 Clicks Score (PT): 12  AM-PAC 6 Clicks Score (OT): 13  PT Discharge Summary  Anticipated Discharge Disposition (PT): home with 24/7 care, home with home health (Pending pt. progress)    Usama Martínez, PT  5/16/2025

## 2025-05-16 NOTE — DISCHARGE PLACEMENT REQUEST
"Jose Gregory (89 y.o. Male)       Date of Birth   01/04/1930    Social Security Number       Address   [address] [place] [id number]    Home Phone   [phone]    MRN   [id number]       Church   [employer]    Marital Status                               Admission Date   5/13/2025    Admission Type   Emergency    Admitting Provider   Farrukh Pedroza MD    Attending Provider   Farrukh Pedroza MD    Department, Room/Bed   [address], E649/1       Discharge Date       Discharge Disposition       Discharge Destination                                 Attending Provider: Farrukh Pedroza MD    Allergies: No Known Allergies    Isolation: None   Infection: None   Code Status: No CPR    Ht: 177.8 cm (70\")   Wt: 100 kg (220 lb 10.9 oz)    Admission Cmt: None   Principal Problem: Symptomatic anemia [D64.9]                   Active Insurance as of 5/13/2025       Primary Coverage       Payor Plan Insurance Group Employer/Plan Group    HUMANA MEDICARE REPLACEMENT HUMANA MEDICARE ADVANTAGE HMO [id number]       Payor Plan Address Payor Plan Phone Number Payor Plan Fax Number Effective Dates    [address] [address] [phone]  12/3/2024 - None Entered    [facility] [id number]         Subscriber Name Subscriber Birth Date Member ID       JOSE GREGORY 1/4/1930 [id number]                     Emergency Contacts        (Rel.) Home Phone Work Phone Mobile Phone    Vincent Gregory (Son) [phone] -- [phone]    Edi Metz (Financial Guarantor) -- [phone] --    Amarilys Gregory (Daughter) -- -- [phone]                "

## 2025-05-16 NOTE — PROGRESS NOTES
Name: Sacha Whitehead ADMIT: 2025   : 1930  PCP: Alvina Hauser MD    MRN: [id number] LOS: 3 days   AGE/SEX: 89 y.o. male  ROOM: [facility]     Subjective   Subjective   Resting in bed, no evidence of bleeding.  Still not having a ton of activity level    Objective   Objective   Vital Signs  Temp:  [97 °F (36.1 °C)-98 °F (36.7 °C)] 97.9 °F (36.6 °C)  Heart Rate:  [65-86] 81  Resp:  [16-18] 18  BP: (112-138)/(51-73) 113/51  SpO2:  [92 %-95 %] 94 %  on   ;   Device (Oxygen Therapy): room air  Body mass index is 31.66 kg/m².  Physical Exam  Constitutional:       Appearance: He is ill-appearing.      Comments: Hard of hearing   Pulmonary:      Effort: Pulmonary effort is normal. No respiratory distress.      Breath sounds: No stridor.   Musculoskeletal:      Right lower leg: Edema present.      Left lower leg: Edema present.   Skin:     Coloration: Skin is pale.      Findings: Bruising present.   Neurological:      General: No focal deficit present.      Mental Status: He is alert.         Results Review     I reviewed the patient's new clinical results.  Results from last 7 days   Lab Units 05/16/25  0410 05/15/25  0545 25  0825  0025 25  1625 25  1044   WBC 10*3/mm3 5.29 5.40 4.29  --   --  4.40   HEMOGLOBIN g/dL 7.3* 7.5* 7.6* 6.8*   < > 5.3*   PLATELETS 10*3/mm3 136* 112* 130*  --   --  138*    < > = values in this interval not displayed.     Results from last 7 days   Lab Units 05/16/25  0410 05/15/25  0545 25  0825  1044   SODIUM mmol/L 142 146* 143 145   POTASSIUM mmol/L 3.8 4.4 4.3 4.9   CHLORIDE mmol/L 108* 115* 110* 111*   CO2 mmol/L 24.0 22.1 23.0 22.6   BUN mg/dL 26* 29* 33* 38*   CREATININE mg/dL 1.10 0.99 1.16 1.24   GLUCOSE mg/dL 145* 107* 114* 125*   EGFR mL/min/1.73 61.8 70.1 58.0* 53.5*     Results from last 7 days   Lab Units 25  1044   ALBUMIN g/dL 3.5   BILIRUBIN mg/dL 0.5   ALK PHOS U/L 108   AST (SGOT) U/L 21   ALT (SGPT) U/L 20      Results from last 7 days   Lab Units 05/16/25  0410 05/15/25  0545 05/14/25  0823 05/13/25  1044   CALCIUM mg/dL 8.8 8.9 9.0 9.1   ALBUMIN g/dL  --   --   --  3.5       Glucose   Date/Time Value Ref Range Status   05/16/2025 1034 171 (H) 70 - 130 mg/dL Final   05/16/2025 0621 121 70 - 130 mg/dL Final   05/15/2025 2034 139 (H) 70 - 130 mg/dL Final   05/15/2025 1517 105 70 - 130 mg/dL Final   05/15/2025 1113 240 (H) 70 - 130 mg/dL Final   05/15/2025 0555 150 (H) 70 - 130 mg/dL Final   05/14/2025 2033 173 (H) 70 - 130 mg/dL Final       No radiology results for the last day    Scheduled Medications  apixaban, 5 mg, Oral, Q12H  ascorbic acid, 1,000 mg, Oral, Daily  carvedilol, 6.25 mg, Oral, BID With Meals  folic acid, 1,000 mcg, Oral, Daily  furosemide, 40 mg, Intravenous, QAM  insulin lispro, 2-7 Units, Subcutaneous, 4x Daily AC & at Bedtime  pantoprazole, 40 mg, Oral, QAM AC  vitamin B-12, 1,000 mcg, Oral, Daily    Infusions   Diet  Diet: Cardiac; Healthy Heart (2-3 Na+); Fluid Consistency: Thin (IDDSI 0)       Assessment/Plan     Active Hospital Problems    Diagnosis  POA    **Symptomatic anemia [D64.9]  Yes    Supratherapeutic INR [R79.1]  Yes    Warfarin anticoagulation [Z79.01]  Not Applicable    Sick sinus syndrome [I49.5]  Yes    Essential hypertension [I10]  Yes    Atrial fibrillation [I48.91]  Yes      Resolved Hospital Problems   No resolved problems to display.       89 y.o. male admitted with Symptomatic anemia.      05/16/25  Hemoglobin has remained stable on AC, may consider given another unit remains the sleepy.  I think is probably going to benefit from rehab.  Complete IV iron today.    Symptomatic anemia/BILLY/ABLA  -Hb  5.3 OA > 2units PRBC > 6.8 > 1 unit > 7.6  -GI consulted-conservative management discussed with family  -PPI daily  -3d IV iron     Afib  Supratherapeutic INR, resolved  S/p PPM  -RC: Coreg  -AC: wafarin switched to Eliquis per Cardiology  -INR > 10 OA, s/p vitamin K and KCentra in  ED    LE edema  -cont IV Laisx     Elevated troponin  Trop 85 > 61; EKG no acute ischemia  - Likely demand related to severe anemia, not consistent with ACS  - Cardiology following- no acute ischemic eval indicated     DM2 w/ hyperglycemia  -hold home PO meds  -SSI; monitor for hypoglycemia         DVT prophylaxis: Eliquis (home med)   Discussed with patient and nursing staff.  Anticipated discharge home with HH vs SNF, 1-2 days            Farrukh Pedroza MD  [facility]  05/16/25  11:39 EDT

## 2025-05-16 NOTE — PLAN OF CARE
Goal Outcome Evaluation:  Plan of Care Reviewed With: patient        Progress: improving  Outcome Evaluation: No complaints voiced, denies pain VSS, afib on the monitor with a controlled rate, Eyes closed at long interval, resting quietly in his room

## 2025-05-16 NOTE — PLAN OF CARE
Goal Outcome Evaluation:  Plan of Care Reviewed With: patient           Outcome Evaluation: Upon entering room, pt. supine in bed, initially asleep, but awakens with tactile stimuli.  Pt. declines attempts to get out of bed for ambulation despite verbal encouragement and education on importance of mobility.  With additional encouragement, pt. was agreeable to bed ther. ex. program only this date reporting he is too fatigued to get out of bed.  BUE/LE ther. ex. program x 10 reps completed for general strengthening.  Encouraged pt. to continue these ther. ex. on his own throughout the day as well. Will continue to progress functional mobility as tolerated.    Anticipated Discharge Disposition (PT): home with 24/7 care, home with home health (Pending pt. progress)

## 2025-05-17 LAB
ANION GAP SERPL CALCULATED.3IONS-SCNC: 6 MMOL/L (ref 5–15)
BASOPHILS # BLD AUTO: 0.02 10*3/MM3 (ref 0–0.2)
BASOPHILS NFR BLD AUTO: 0.4 % (ref 0–1.5)
BUN SERPL-MCNC: 28 MG/DL (ref 8–23)
BUN/CREAT SERPL: 26.4 (ref 7–25)
CALCIUM SPEC-SCNC: 9 MG/DL (ref 8.2–9.6)
CHLORIDE SERPL-SCNC: 109 MMOL/L (ref 98–107)
CO2 SERPL-SCNC: 28 MMOL/L (ref 22–29)
CREAT SERPL-MCNC: 1.06 MG/DL (ref 0.76–1.27)
DEPRECATED RDW RBC AUTO: 63.7 FL (ref 37–54)
EGFRCR SERPLBLD CKD-EPI 2021: 64.6 ML/MIN/1.73
EOSINOPHIL # BLD AUTO: 0.21 10*3/MM3 (ref 0–0.4)
EOSINOPHIL NFR BLD AUTO: 3.8 % (ref 0.3–6.2)
ERYTHROCYTE [DISTWIDTH] IN BLOOD BY AUTOMATED COUNT: 17.2 % (ref 12.3–15.4)
GLUCOSE BLDC GLUCOMTR-MCNC: 108 MG/DL (ref 70–130)
GLUCOSE BLDC GLUCOMTR-MCNC: 187 MG/DL (ref 70–130)
GLUCOSE BLDC GLUCOMTR-MCNC: 226 MG/DL (ref 70–130)
GLUCOSE BLDC GLUCOMTR-MCNC: 88 MG/DL (ref 70–130)
GLUCOSE SERPL-MCNC: 119 MG/DL (ref 65–99)
HCT VFR BLD AUTO: 25.1 % (ref 37.5–51)
HGB BLD-MCNC: 7.5 G/DL (ref 13–17.7)
IMM GRANULOCYTES # BLD AUTO: 0.05 10*3/MM3 (ref 0–0.05)
IMM GRANULOCYTES NFR BLD AUTO: 0.9 % (ref 0–0.5)
INR PPP: 3.57 (ref 0.9–1.1)
LYMPHOCYTES # BLD AUTO: 0.71 10*3/MM3 (ref 0.7–3.1)
LYMPHOCYTES NFR BLD AUTO: 12.8 % (ref 19.6–45.3)
MCH RBC QN AUTO: 29.8 PG (ref 26.6–33)
MCHC RBC AUTO-ENTMCNC: 29.9 G/DL (ref 31.5–35.7)
MCV RBC AUTO: 99.6 FL (ref 79–97)
MONOCYTES # BLD AUTO: 0.67 10*3/MM3 (ref 0.1–0.9)
MONOCYTES NFR BLD AUTO: 12.1 % (ref 5–12)
NEUTROPHILS NFR BLD AUTO: 3.88 10*3/MM3 (ref 1.7–7)
NEUTROPHILS NFR BLD AUTO: 70 % (ref 42.7–76)
PLATELET # BLD AUTO: 156 10*3/MM3 (ref 140–450)
PMV BLD AUTO: 11.4 FL (ref 6–12)
POTASSIUM SERPL-SCNC: 4.4 MMOL/L (ref 3.5–5.2)
PROTHROMBIN TIME: 36.2 SECONDS (ref 11.7–14.2)
RBC # BLD AUTO: 2.52 10*6/MM3 (ref 4.14–5.8)
SODIUM SERPL-SCNC: 143 MMOL/L (ref 136–145)
WBC NRBC COR # BLD AUTO: 5.54 10*3/MM3 (ref 3.4–10.8)

## 2025-05-17 PROCEDURE — 63710000001 INSULIN LISPRO (HUMAN) PER 5 UNITS: Performed by: STUDENT IN AN ORGANIZED HEALTH CARE EDUCATION/TRAINING PROGRAM

## 2025-05-17 PROCEDURE — 82948 REAGENT STRIP/BLOOD GLUCOSE: CPT

## 2025-05-17 PROCEDURE — 85025 COMPLETE CBC W/AUTO DIFF WBC: CPT | Performed by: STUDENT IN AN ORGANIZED HEALTH CARE EDUCATION/TRAINING PROGRAM

## 2025-05-17 PROCEDURE — 80048 BASIC METABOLIC PNL TOTAL CA: CPT | Performed by: STUDENT IN AN ORGANIZED HEALTH CARE EDUCATION/TRAINING PROGRAM

## 2025-05-17 PROCEDURE — 25010000002 FUROSEMIDE PER 20 MG: Performed by: STUDENT IN AN ORGANIZED HEALTH CARE EDUCATION/TRAINING PROGRAM

## 2025-05-17 PROCEDURE — 36415 COLL VENOUS BLD VENIPUNCTURE: CPT | Performed by: STUDENT IN AN ORGANIZED HEALTH CARE EDUCATION/TRAINING PROGRAM

## 2025-05-17 PROCEDURE — 85610 PROTHROMBIN TIME: CPT | Performed by: STUDENT IN AN ORGANIZED HEALTH CARE EDUCATION/TRAINING PROGRAM

## 2025-05-17 RX ADMIN — FUROSEMIDE 40 MG: 10 INJECTION, SOLUTION INTRAMUSCULAR; INTRAVENOUS at 08:12

## 2025-05-17 RX ADMIN — FOLIC ACID 1000 MCG: 1 TABLET ORAL at 08:12

## 2025-05-17 RX ADMIN — CARVEDILOL 6.25 MG: 6.25 TABLET, FILM COATED ORAL at 18:16

## 2025-05-17 RX ADMIN — OXYCODONE HYDROCHLORIDE AND ACETAMINOPHEN 1000 MG: 500 TABLET ORAL at 08:12

## 2025-05-17 RX ADMIN — APIXABAN 5 MG: 5 TABLET, FILM COATED ORAL at 21:27

## 2025-05-17 RX ADMIN — INSULIN LISPRO 3 UNITS: 100 INJECTION, SOLUTION INTRAVENOUS; SUBCUTANEOUS at 12:30

## 2025-05-17 RX ADMIN — APIXABAN 5 MG: 5 TABLET, FILM COATED ORAL at 08:12

## 2025-05-17 RX ADMIN — INSULIN LISPRO 2 UNITS: 100 INJECTION, SOLUTION INTRAVENOUS; SUBCUTANEOUS at 21:27

## 2025-05-17 RX ADMIN — Medication 1000 MCG: at 08:12

## 2025-05-17 RX ADMIN — PANTOPRAZOLE SODIUM 40 MG: 40 TABLET, DELAYED RELEASE ORAL at 08:12

## 2025-05-17 RX ADMIN — CARVEDILOL 6.25 MG: 6.25 TABLET, FILM COATED ORAL at 08:12

## 2025-05-17 NOTE — PLAN OF CARE
Goal Outcome Evaluation:      Patient resting in bed.Alert and oriented.Denies any pain.Medicated per MAR.[place] [facility] [place].Plan of care ongoing

## 2025-05-17 NOTE — PLAN OF CARE
Goal Outcome Evaluation:  No complaints during shift.  Pt resting in chair, VSS will continue to monitor

## 2025-05-17 NOTE — PROGRESS NOTES
Name: Sacha Whitehead ADMIT: 2025   : 1930  PCP: Alvina Hauser MD    MRN: [id number] LOS: 4 days   AGE/SEX: 89 y.o. male  ROOM: [facility]     Subjective   Subjective   Sitting up in chair, denies new issues.    Objective   Objective   Vital Signs  Temp:  [97.3 °F (36.3 °C)-98 °F (36.7 °C)] 97.3 °F (36.3 °C)  Heart Rate:  [61-75] 75  Resp:  [17-18] 17  BP: (117-130)/(49-59) 121/58  SpO2:  [95 %-96 %] 95 %  on   ;   Device (Oxygen Therapy): room air  Body mass index is 31.66 kg/m².  Physical Exam  Constitutional:       Appearance: He is ill-appearing.      Comments: Hard of hearing   Pulmonary:      Effort: Pulmonary effort is normal. No respiratory distress.      Breath sounds: No stridor.   Musculoskeletal:      Right lower leg: Edema present.      Left lower leg: Edema present.      Comments: Lower extremity edema improved from prior   Skin:     Coloration: Skin is pale.      Findings: Bruising present.   Neurological:      General: No focal deficit present.      Mental Status: He is alert.         Results Review     I reviewed the patient's new clinical results.  Results from last 7 days   Lab Units 25  0402 05/16/25  0410 05/15/25  0545 25  0823   WBC 10*3/mm3 5.54 5.29 5.40 4.29   HEMOGLOBIN g/dL 7.5* 7.3* 7.5* 7.6*   PLATELETS 10*3/mm3 156 136* 112* 130*     Results from last 7 days   Lab Units 25  0402 25  0410 05/15/25  0545 25  0823   SODIUM mmol/L 143 142 146* 143   POTASSIUM mmol/L 4.4 3.8 4.4 4.3   CHLORIDE mmol/L 109* 108* 115* 110*   CO2 mmol/L 28.0 24.0 22.1 23.0   BUN mg/dL 28* 26* 29* 33*   CREATININE mg/dL 1.06 1.10 0.99 1.16   GLUCOSE mg/dL 119* 145* 107* 114*   EGFR mL/min/1.73 64.6 61.8 70.1 58.0*     Results from last 7 days   Lab Units 25  1044   ALBUMIN g/dL 3.5   BILIRUBIN mg/dL 0.5   ALK PHOS U/L 108   AST (SGOT) U/L 21   ALT (SGPT) U/L 20     Results from last 7 days   Lab Units 25  0402 25  0410 05/15/25  0545 25  0823  05/13/25  1044   CALCIUM mg/dL 9.0 8.8 8.9 9.0 9.1   ALBUMIN g/dL  --   --   --   --  3.5       Glucose   Date/Time Value Ref Range Status   05/17/2025 1104 226 (H) 70 - 130 mg/dL Final   05/17/2025 0655 108 70 - 130 mg/dL Final   05/16/2025 2032 132 (H) 70 - 130 mg/dL Final   05/16/2025 1537 188 (H) 70 - 130 mg/dL Final   05/16/2025 1034 171 (H) 70 - 130 mg/dL Final   05/16/2025 0621 121 70 - 130 mg/dL Final   05/15/2025 2034 139 (H) 70 - 130 mg/dL Final       No radiology results for the last day    Scheduled Medications  apixaban, 5 mg, Oral, Q12H  ascorbic acid, 1,000 mg, Oral, Daily  carvedilol, 6.25 mg, Oral, BID With Meals  folic acid, 1,000 mcg, Oral, Daily  furosemide, 40 mg, Intravenous, QAM  insulin lispro, 2-7 Units, Subcutaneous, 4x Daily AC & at Bedtime  pantoprazole, 40 mg, Oral, QAM AC  vitamin B-12, 1,000 mcg, Oral, Daily    Infusions   Diet  Diet: Cardiac; Healthy Heart (2-3 Na+); Fluid Consistency: Thin (IDDSI 0)       Assessment/Plan     Active Hospital Problems    Diagnosis  POA   • **Symptomatic anemia [D64.9]  Yes   • Supratherapeutic INR [R79.1]  Yes   • Warfarin anticoagulation [Z79.01]  Not Applicable   • Sick sinus syndrome [I49.5]  Yes   • Essential hypertension [I10]  Yes   • Atrial fibrillation [I48.91]  Yes      Resolved Hospital Problems   No resolved problems to display.       89 y.o. male admitted with Symptomatic anemia.      05/17/25  Will continue with IV Lasix, he is responding well to this.  Pending dispo, I think he would benefit from SNF prior to returning home. Tolerating Eliquis.    Symptomatic anemia/BILLY/ABLA  -Hb  5.3 OA > 2units PRBC > 6.8 > 1 unit > 7.6, stable since resuming AC  -GI consulted-conservative management discussed with family  -PPI daily  -3d IV iron completed     Afib  Supratherapeutic INR, resolved  S/p PPM  -RC: Coreg  -AC: wafarin switched to Eliquis per Cardiology  -INR > 10 OA, s/p vitamin K and KCentra in ED    LE edema  -cont IV Laisx     Elevated  troponin  Trop 85 > 61; EKG no acute ischemia  - Likely demand related to severe anemia, not consistent with ACS  - Cardiology following- no acute ischemic eval indicated     DM2 w/ hyperglycemia  -hold home PO meds  -SSI; monitor for hypoglycemia         DVT prophylaxis: Eliquis (home med)   Discussed with patient and nursing staff.  Anticipated discharge home with HH vs SNF, 1-2 days            Farrukh Pedroza MD  [facility]  05/17/25  12:44 EDT

## 2025-05-18 PROBLEM — E11.65 TYPE 2 DIABETES MELLITUS WITH HYPERGLYCEMIA: Status: ACTIVE | Noted: 2025-05-18

## 2025-05-18 LAB
ANION GAP SERPL CALCULATED.3IONS-SCNC: 9 MMOL/L (ref 5–15)
BASOPHILS # BLD AUTO: 0.02 10*3/MM3 (ref 0–0.2)
BASOPHILS NFR BLD AUTO: 0.3 % (ref 0–1.5)
BUN SERPL-MCNC: 29 MG/DL (ref 8–23)
BUN/CREAT SERPL: 25.2 (ref 7–25)
CALCIUM SPEC-SCNC: 9.6 MG/DL (ref 8.2–9.6)
CHLORIDE SERPL-SCNC: 103 MMOL/L (ref 98–107)
CO2 SERPL-SCNC: 28 MMOL/L (ref 22–29)
CREAT SERPL-MCNC: 1.15 MG/DL (ref 0.76–1.27)
DEPRECATED RDW RBC AUTO: 58.7 FL (ref 37–54)
EGFRCR SERPLBLD CKD-EPI 2021: 58.6 ML/MIN/1.73
EOSINOPHIL # BLD AUTO: 0.21 10*3/MM3 (ref 0–0.4)
EOSINOPHIL NFR BLD AUTO: 3.4 % (ref 0.3–6.2)
ERYTHROCYTE [DISTWIDTH] IN BLOOD BY AUTOMATED COUNT: 17.5 % (ref 12.3–15.4)
GLUCOSE BLDC GLUCOMTR-MCNC: 119 MG/DL (ref 70–130)
GLUCOSE BLDC GLUCOMTR-MCNC: 132 MG/DL (ref 70–130)
GLUCOSE BLDC GLUCOMTR-MCNC: 149 MG/DL (ref 70–130)
GLUCOSE BLDC GLUCOMTR-MCNC: 222 MG/DL (ref 70–130)
GLUCOSE SERPL-MCNC: 113 MG/DL (ref 65–99)
HCT VFR BLD AUTO: 22.3 % (ref 37.5–51)
HGB BLD-MCNC: 7.2 G/DL (ref 13–17.7)
IMM GRANULOCYTES # BLD AUTO: 0.04 10*3/MM3 (ref 0–0.05)
IMM GRANULOCYTES NFR BLD AUTO: 0.7 % (ref 0–0.5)
INR PPP: 3.73 (ref 0.9–1.1)
LYMPHOCYTES # BLD AUTO: 0.71 10*3/MM3 (ref 0.7–3.1)
LYMPHOCYTES NFR BLD AUTO: 11.6 % (ref 19.6–45.3)
MCH RBC QN AUTO: 30.6 PG (ref 26.6–33)
MCHC RBC AUTO-ENTMCNC: 32.3 G/DL (ref 31.5–35.7)
MCV RBC AUTO: 94.9 FL (ref 79–97)
MONOCYTES # BLD AUTO: 0.86 10*3/MM3 (ref 0.1–0.9)
MONOCYTES NFR BLD AUTO: 14.1 % (ref 5–12)
NEUTROPHILS NFR BLD AUTO: 4.26 10*3/MM3 (ref 1.7–7)
NEUTROPHILS NFR BLD AUTO: 69.9 % (ref 42.7–76)
NRBC BLD AUTO-RTO: 1.3 /100 WBC (ref 0–0.2)
PLATELET # BLD AUTO: 155 10*3/MM3 (ref 140–450)
PMV BLD AUTO: 10.4 FL (ref 6–12)
POTASSIUM SERPL-SCNC: 4.1 MMOL/L (ref 3.5–5.2)
PROTHROMBIN TIME: 37.6 SECONDS (ref 11.7–14.2)
RBC # BLD AUTO: 2.35 10*6/MM3 (ref 4.14–5.8)
SODIUM SERPL-SCNC: 140 MMOL/L (ref 136–145)
WBC NRBC COR # BLD AUTO: 6.1 10*3/MM3 (ref 3.4–10.8)

## 2025-05-18 PROCEDURE — 82948 REAGENT STRIP/BLOOD GLUCOSE: CPT

## 2025-05-18 PROCEDURE — 25010000002 FUROSEMIDE PER 20 MG: Performed by: STUDENT IN AN ORGANIZED HEALTH CARE EDUCATION/TRAINING PROGRAM

## 2025-05-18 PROCEDURE — 80048 BASIC METABOLIC PNL TOTAL CA: CPT | Performed by: STUDENT IN AN ORGANIZED HEALTH CARE EDUCATION/TRAINING PROGRAM

## 2025-05-18 PROCEDURE — 85025 COMPLETE CBC W/AUTO DIFF WBC: CPT | Performed by: STUDENT IN AN ORGANIZED HEALTH CARE EDUCATION/TRAINING PROGRAM

## 2025-05-18 PROCEDURE — 63710000001 INSULIN LISPRO (HUMAN) PER 5 UNITS: Performed by: STUDENT IN AN ORGANIZED HEALTH CARE EDUCATION/TRAINING PROGRAM

## 2025-05-18 PROCEDURE — 36415 COLL VENOUS BLD VENIPUNCTURE: CPT | Performed by: STUDENT IN AN ORGANIZED HEALTH CARE EDUCATION/TRAINING PROGRAM

## 2025-05-18 PROCEDURE — 85610 PROTHROMBIN TIME: CPT | Performed by: STUDENT IN AN ORGANIZED HEALTH CARE EDUCATION/TRAINING PROGRAM

## 2025-05-18 RX ORDER — FUROSEMIDE 40 MG/1
20 TABLET ORAL DAILY
Status: DISCONTINUED | OUTPATIENT
Start: 2025-05-19 | End: 2025-05-22 | Stop reason: HOSPADM

## 2025-05-18 RX ADMIN — CARVEDILOL 6.25 MG: 6.25 TABLET, FILM COATED ORAL at 09:16

## 2025-05-18 RX ADMIN — OXYCODONE HYDROCHLORIDE AND ACETAMINOPHEN 1000 MG: 500 TABLET ORAL at 09:16

## 2025-05-18 RX ADMIN — FUROSEMIDE 40 MG: 10 INJECTION, SOLUTION INTRAMUSCULAR; INTRAVENOUS at 09:17

## 2025-05-18 RX ADMIN — INSULIN LISPRO 3 UNITS: 100 INJECTION, SOLUTION INTRAVENOUS; SUBCUTANEOUS at 17:27

## 2025-05-18 RX ADMIN — APIXABAN 5 MG: 5 TABLET, FILM COATED ORAL at 09:20

## 2025-05-18 RX ADMIN — Medication 1000 MCG: at 09:17

## 2025-05-18 RX ADMIN — FOLIC ACID 1000 MCG: 1 TABLET ORAL at 09:16

## 2025-05-18 RX ADMIN — APIXABAN 5 MG: 5 TABLET, FILM COATED ORAL at 20:24

## 2025-05-18 RX ADMIN — PANTOPRAZOLE SODIUM 40 MG: 40 TABLET, DELAYED RELEASE ORAL at 09:16

## 2025-05-18 RX ADMIN — CARVEDILOL 6.25 MG: 6.25 TABLET, FILM COATED ORAL at 17:27

## 2025-05-18 NOTE — PLAN OF CARE
Goal Outcome Evaluation:         Patient resting in bed.VSS.Some c/o back pain.Repositioned as needed.Room air.Plan of care ongoing

## 2025-05-18 NOTE — PROGRESS NOTES
Name: Sacha Whitehead ADMIT: 2025   : 1930  PCP: Alvina Hauser MD    MRN: [id number] LOS: 5 days   AGE/SEX: 89 y.o. male  ROOM: [facility]     Subjective   Subjective   No overnight issues.  Resting comfortably in bed.    Objective   Objective   Vital Signs  Temp:  [97.6 °F (36.4 °C)-98.2 °F (36.8 °C)] 98.1 °F (36.7 °C)  Heart Rate:  [65-82] 77  Resp:  [16-18] 16  BP: (105-137)/(40-63) 105/40  SpO2:  [95 %-100 %] 96 %  on   ;   Device (Oxygen Therapy): room air  Body mass index is 30.87 kg/m².  Physical Exam  Constitutional:       Appearance: He is ill-appearing.      Comments: Hard of hearing   Pulmonary:      Effort: Pulmonary effort is normal. No respiratory distress.      Breath sounds: No stridor.   Musculoskeletal:      Right lower leg: Edema present.      Left lower leg: Edema present.      Comments: Lower extremity edema improved from prior   Skin:     Coloration: Skin is pale.      Findings: Bruising present.   Neurological:      General: No focal deficit present.      Mental Status: He is alert.         Results Review     I reviewed the patient's new clinical results.  Results from last 7 days   Lab Units 25  0616 25  0402 05/16/25  0410 05/15/25  0545   WBC 10*3/mm3 6.10 5.54 5.29 5.40   HEMOGLOBIN g/dL 7.2* 7.5* 7.3* 7.5*   PLATELETS 10*3/mm3 155 156 136* 112*     Results from last 7 days   Lab Units 25  0616 25  0402 25  0410 05/15/25  0545   SODIUM mmol/L 140 143 142 146*   POTASSIUM mmol/L 4.1 4.4 3.8 4.4   CHLORIDE mmol/L 103 109* 108* 115*   CO2 mmol/L 28.0 28.0 24.0 22.1   BUN mg/dL 29* 28* 26* 29*   CREATININE mg/dL 1.15 1.06 1.10 0.99   GLUCOSE mg/dL 113* 119* 145* 107*   EGFR mL/min/1.73 58.6* 64.6 61.8 70.1     Results from last 7 days   Lab Units 25  1044   ALBUMIN g/dL 3.5   BILIRUBIN mg/dL 0.5   ALK PHOS U/L 108   AST (SGOT) U/L 21   ALT (SGPT) U/L 20     Results from last 7 days   Lab Units 25  0616 25  0402 25  0410  05/15/25  0545 05/14/25  0823 05/13/25  1044   CALCIUM mg/dL 9.6 9.0 8.8 8.9   < > 9.1   ALBUMIN g/dL  --   --   --   --   --  3.5    < > = values in this interval not displayed.       Glucose   Date/Time Value Ref Range Status   05/18/2025 0620 119 70 - 130 mg/dL Final   05/17/2025 2110 187 (H) 70 - 130 mg/dL Final   05/17/2025 1609 88 70 - 130 mg/dL Final   05/17/2025 1104 226 (H) 70 - 130 mg/dL Final   05/17/2025 0655 108 70 - 130 mg/dL Final   05/16/2025 2032 132 (H) 70 - 130 mg/dL Final   05/16/2025 1537 188 (H) 70 - 130 mg/dL Final       No radiology results for the last day    Scheduled Medications  apixaban, 5 mg, Oral, Q12H  ascorbic acid, 1,000 mg, Oral, Daily  carvedilol, 6.25 mg, Oral, BID With Meals  folic acid, 1,000 mcg, Oral, Daily  furosemide, 40 mg, Intravenous, QAM  insulin lispro, 2-7 Units, Subcutaneous, 4x Daily AC & at Bedtime  pantoprazole, 40 mg, Oral, QAM AC  vitamin B-12, 1,000 mcg, Oral, Daily    Infusions   Diet  Diet: Cardiac; Healthy Heart (2-3 Na+); Fluid Consistency: Thin (IDDSI 0)       Assessment/Plan     Active Hospital Problems    Diagnosis  POA   • **Symptomatic anemia [D64.9]  Yes   • Supratherapeutic INR [R79.1]  Yes   • Warfarin anticoagulation [Z79.01]  Not Applicable   • Sick sinus syndrome [I49.5]  Yes   • Essential hypertension [I10]  Yes   • Atrial fibrillation [I48.91]  Yes      Resolved Hospital Problems   No resolved problems to display.       89 y.o. male admitted with Symptomatic anemia.      05/18/25  3L out yesterday, peripheral edema improved.  Will transition to p.o. Lasix tomorrow.    Symptomatic anemia/BILLY/ABLA  -Hb  5.3 OA > 2units PRBC > 6.8 > 1 unit > 7.6, stable since resuming AC  -GI consulted-conservative management discussed with family  -PPI daily  -3d IV iron completed     Afib  Supratherapeutic INR, resolved  S/p PPM  -RC: Coreg  -AC: wafarin switched to Eliquis per Cardiology  -INR > 10 OA, s/p vitamin K and KCentra in ED    LE edema  -volume status  improved, will resume PO Lasix at 20mg daily (was taking 10mg at home)     Elevated troponin  -Trop 85 > 61; EKG no acute ischemia  -Likely demand related to severe anemia, not consistent with ACS  -Cardiology eval'd- no acute ischemic eval indicated     DM2 w/ hyperglycemia  -hold home PO meds  -SSI; monitor for hypoglycemia         DVT prophylaxis: Eliquis (home med)   Discussed with patient and nursing staff.  Anticipated discharge home with HH vs SNF, 1-2 days            Farrukh Pedroza MD  [facility]  05/18/25  08:02 EDT

## 2025-05-19 LAB
ABO GROUP BLD: NORMAL
ANION GAP SERPL CALCULATED.3IONS-SCNC: 6.4 MMOL/L (ref 5–15)
BASOPHILS # BLD AUTO: 0.01 10*3/MM3 (ref 0–0.2)
BASOPHILS NFR BLD AUTO: 0.2 % (ref 0–1.5)
BLD GP AB SCN SERPL QL: NEGATIVE
BUN SERPL-MCNC: 27 MG/DL (ref 8–23)
BUN/CREAT SERPL: 27 (ref 7–25)
CALCIUM SPEC-SCNC: 9.7 MG/DL (ref 8.2–9.6)
CHLORIDE SERPL-SCNC: 106 MMOL/L (ref 98–107)
CO2 SERPL-SCNC: 29.6 MMOL/L (ref 22–29)
CREAT SERPL-MCNC: 1 MG/DL (ref 0.76–1.27)
DEPRECATED RDW RBC AUTO: 63.6 FL (ref 37–54)
EGFRCR SERPLBLD CKD-EPI 2021: 69.3 ML/MIN/1.73
EOSINOPHIL # BLD AUTO: 0.18 10*3/MM3 (ref 0–0.4)
EOSINOPHIL NFR BLD AUTO: 2.9 % (ref 0.3–6.2)
ERYTHROCYTE [DISTWIDTH] IN BLOOD BY AUTOMATED COUNT: 18.1 % (ref 12.3–15.4)
GLUCOSE BLDC GLUCOMTR-MCNC: 115 MG/DL (ref 70–130)
GLUCOSE BLDC GLUCOMTR-MCNC: 127 MG/DL (ref 70–130)
GLUCOSE BLDC GLUCOMTR-MCNC: 155 MG/DL (ref 70–130)
GLUCOSE BLDC GLUCOMTR-MCNC: 193 MG/DL (ref 70–130)
GLUCOSE SERPL-MCNC: 119 MG/DL (ref 65–99)
HCT VFR BLD AUTO: 23.2 % (ref 37.5–51)
HCT VFR BLD AUTO: 23.3 % (ref 37.5–51)
HGB BLD-MCNC: 7.1 G/DL (ref 13–17.7)
HGB BLD-MCNC: 7.3 G/DL (ref 13–17.7)
IMM GRANULOCYTES # BLD AUTO: 0.05 10*3/MM3 (ref 0–0.05)
IMM GRANULOCYTES NFR BLD AUTO: 0.8 % (ref 0–0.5)
LYMPHOCYTES # BLD AUTO: 0.79 10*3/MM3 (ref 0.7–3.1)
LYMPHOCYTES NFR BLD AUTO: 12.9 % (ref 19.6–45.3)
MCH RBC QN AUTO: 30.1 PG (ref 26.6–33)
MCHC RBC AUTO-ENTMCNC: 30.6 G/DL (ref 31.5–35.7)
MCV RBC AUTO: 98.3 FL (ref 79–97)
MONOCYTES # BLD AUTO: 0.87 10*3/MM3 (ref 0.1–0.9)
MONOCYTES NFR BLD AUTO: 14.2 % (ref 5–12)
NEUTROPHILS NFR BLD AUTO: 4.23 10*3/MM3 (ref 1.7–7)
NEUTROPHILS NFR BLD AUTO: 69 % (ref 42.7–76)
NRBC BLD AUTO-RTO: 0.7 /100 WBC (ref 0–0.2)
PLATELET # BLD AUTO: 149 10*3/MM3 (ref 140–450)
PMV BLD AUTO: 11.2 FL (ref 6–12)
POTASSIUM SERPL-SCNC: 4.4 MMOL/L (ref 3.5–5.2)
RBC # BLD AUTO: 2.36 10*6/MM3 (ref 4.14–5.8)
RH BLD: POSITIVE
SODIUM SERPL-SCNC: 142 MMOL/L (ref 136–145)
T&S EXPIRATION DATE: NORMAL
WBC NRBC COR # BLD AUTO: 6.13 10*3/MM3 (ref 3.4–10.8)

## 2025-05-19 PROCEDURE — 86923 COMPATIBILITY TEST ELECTRIC: CPT

## 2025-05-19 PROCEDURE — 86850 RBC ANTIBODY SCREEN: CPT | Performed by: STUDENT IN AN ORGANIZED HEALTH CARE EDUCATION/TRAINING PROGRAM

## 2025-05-19 PROCEDURE — 86900 BLOOD TYPING SEROLOGIC ABO: CPT

## 2025-05-19 PROCEDURE — 63710000001 INSULIN LISPRO (HUMAN) PER 5 UNITS: Performed by: STUDENT IN AN ORGANIZED HEALTH CARE EDUCATION/TRAINING PROGRAM

## 2025-05-19 PROCEDURE — 97530 THERAPEUTIC ACTIVITIES: CPT

## 2025-05-19 PROCEDURE — 82948 REAGENT STRIP/BLOOD GLUCOSE: CPT

## 2025-05-19 PROCEDURE — 85018 HEMOGLOBIN: CPT | Performed by: STUDENT IN AN ORGANIZED HEALTH CARE EDUCATION/TRAINING PROGRAM

## 2025-05-19 PROCEDURE — 85025 COMPLETE CBC W/AUTO DIFF WBC: CPT | Performed by: STUDENT IN AN ORGANIZED HEALTH CARE EDUCATION/TRAINING PROGRAM

## 2025-05-19 PROCEDURE — 80048 BASIC METABOLIC PNL TOTAL CA: CPT | Performed by: STUDENT IN AN ORGANIZED HEALTH CARE EDUCATION/TRAINING PROGRAM

## 2025-05-19 PROCEDURE — 85014 HEMATOCRIT: CPT | Performed by: STUDENT IN AN ORGANIZED HEALTH CARE EDUCATION/TRAINING PROGRAM

## 2025-05-19 PROCEDURE — 86901 BLOOD TYPING SEROLOGIC RH(D): CPT | Performed by: STUDENT IN AN ORGANIZED HEALTH CARE EDUCATION/TRAINING PROGRAM

## 2025-05-19 PROCEDURE — P9016 RBC LEUKOCYTES REDUCED: HCPCS

## 2025-05-19 PROCEDURE — 86900 BLOOD TYPING SEROLOGIC ABO: CPT | Performed by: STUDENT IN AN ORGANIZED HEALTH CARE EDUCATION/TRAINING PROGRAM

## 2025-05-19 PROCEDURE — 36430 TRANSFUSION BLD/BLD COMPNT: CPT

## 2025-05-19 RX ADMIN — INSULIN LISPRO 2 UNITS: 100 INJECTION, SOLUTION INTRAVENOUS; SUBCUTANEOUS at 17:47

## 2025-05-19 RX ADMIN — FUROSEMIDE 20 MG: 40 TABLET ORAL at 10:31

## 2025-05-19 RX ADMIN — CARVEDILOL 6.25 MG: 6.25 TABLET, FILM COATED ORAL at 10:30

## 2025-05-19 RX ADMIN — APIXABAN 5 MG: 5 TABLET, FILM COATED ORAL at 10:30

## 2025-05-19 RX ADMIN — OXYCODONE HYDROCHLORIDE AND ACETAMINOPHEN 1000 MG: 500 TABLET ORAL at 10:30

## 2025-05-19 RX ADMIN — INSULIN LISPRO 2 UNITS: 100 INJECTION, SOLUTION INTRAVENOUS; SUBCUTANEOUS at 13:56

## 2025-05-19 RX ADMIN — APIXABAN 5 MG: 5 TABLET, FILM COATED ORAL at 20:59

## 2025-05-19 RX ADMIN — PANTOPRAZOLE SODIUM 40 MG: 40 TABLET, DELAYED RELEASE ORAL at 06:31

## 2025-05-19 RX ADMIN — Medication 1000 MCG: at 10:30

## 2025-05-19 RX ADMIN — FOLIC ACID 1000 MCG: 1 TABLET ORAL at 10:31

## 2025-05-19 RX ADMIN — CARVEDILOL 6.25 MG: 6.25 TABLET, FILM COATED ORAL at 17:47

## 2025-05-19 NOTE — PLAN OF CARE
Goal Outcome Evaluation:      Pt retsing in bed , Aox3 , forgetful at times , Easy to reorient. Turn q 2. VSS, Room air , POD/ Afib on tele .Denies pain. Plan of care is ongoing.

## 2025-05-19 NOTE — CASE MANAGEMENT/SOCIAL WORK
Post-Acute Authorization Submission      Post Acute Pre-Cert Documentation  Request Submitted by Facility - Type:: Hospital  Post-Acute Authorization Type Submitted:: SNF  Date Post Acute Pre-Cert Inititated per Facility: 05/19/25  Accepting Facility: [facility] Discharge Date Requested: 05/21/25  All Clinicals Submitted?: Yes  Had Accepting Facility at Time of Submission: Yes  Response Communicated to:: , Accepting Facility Liaison  Authorization Number:: [id number]              TULIO Chi

## 2025-05-19 NOTE — PLAN OF CARE
Goal Outcome Evaluation:  Plan of Care Reviewed With: patient        Progress: improving  Outcome Evaluation: Patient was supine in bed when PT arrived. He was agreeable to PT. Pt was able to transfer supine to sitting EOB w/ min/mod A. Once at EOB, pt was able to perform STS w/ CGA. He ambulated to hallway and turned around back to bed. Pt was easily fatigued but had no overt LOB. Pt returned to bed w/ mod A to BLE back to bed. Pt is encouraged to continue ther ex independently. PT recommendation at discharge is SNF rehab based on pt not at baseline to return home.    Anticipated Discharge Disposition (PT): skilled nursing facility

## 2025-05-19 NOTE — PROGRESS NOTES
Name: Sacha Whitehead ADMIT: 2025   : 1930  PCP: Alvina Hauser MD    MRN: [id number] LOS: 6 days   AGE/SEX: 89 y.o. male  ROOM: [facility]     Subjective   Subjective   Patient is hard of hearing.  No pain.  Discussed plan for blood transfusion.  No active bleeding.    Review of Systems  As above     Objective   Objective   Vital Signs  Temp:  [97.5 °F (36.4 °C)-97.7 °F (36.5 °C)] 97.7 °F (36.5 °C)  Heart Rate:  [62-85] 85  Resp:  [17-20] 20  BP: (110-139)/(57-69) 128/64  SpO2:  [91 %-97 %] 91 %  on   ;   Device (Oxygen Therapy): room air  Body mass index is 30.97 kg/m².  Physical Exam  Vitals and nursing note reviewed.   Constitutional:       General: He is not in acute distress.     Comments: Elderly, frail   Cardiovascular:      Rate and Rhythm: Normal rate and regular rhythm.   Pulmonary:      Effort: Pulmonary effort is normal. No respiratory distress.   Abdominal:      General: Abdomen is flat. There is no distension.      Tenderness: There is no abdominal tenderness.   Musculoskeletal:         General: No swelling or deformity.   Skin:     General: Skin is warm and dry.   Neurological:      General: No focal deficit present.      Mental Status: He is alert. Mental status is at baseline.         Results Review     I reviewed the patient's new clinical results.  Results from last 7 days   Lab Units 25  0611 25  0616 25  0410   WBC 10*3/mm3 6.13 6.10 5.54 5.29   HEMOGLOBIN g/dL 7.1* 7.2* 7.5* 7.3*   PLATELETS 10*3/mm3 149 155 156 136*     Results from last 7 days   Lab Units 25  0611 25  0616 25  0402 25  0410   SODIUM mmol/L 142 140 143 142   POTASSIUM mmol/L 4.4 4.1 4.4 3.8   CHLORIDE mmol/L 106 103 109* 108*   CO2 mmol/L 29.6* 28.0 28.0 24.0   BUN mg/dL 27* 29* 28* 26*   CREATININE mg/dL 1.00 1.15 1.06 1.10   GLUCOSE mg/dL 119* 113* 119* 145*   Estimated Creatinine Clearance: 51.9 mL/min (by C-G formula based on SCr of 1 mg/dL).  Results  "from last 7 days   Lab Units 05/13/25  1044   ALBUMIN g/dL 3.5   BILIRUBIN mg/dL 0.5   ALK PHOS U/L 108   AST (SGOT) U/L 21   ALT (SGPT) U/L 20     Results from last 7 days   Lab Units 05/19/25  0611 05/18/25  0616 05/17/25  0402 05/16/25  0410 05/14/25  0823 05/13/25  1044   CALCIUM mg/dL 9.7* 9.6 9.0 8.8   < > 9.1   ALBUMIN g/dL  --   --   --   --   --  3.5    < > = values in this interval not displayed.     No results found for: \"COVID19\"  Glucose   Date/Time Value Ref Range Status   05/19/2025 1026 193 (H) 70 - 130 mg/dL Final   05/19/2025 0616 127 70 - 130 mg/dL Final   05/18/2025 2128 132 (H) 70 - 130 mg/dL Final   05/18/2025 1527 222 (H) 70 - 130 mg/dL Final   05/18/2025 1042 149 (H) 70 - 130 mg/dL Final   05/18/2025 0620 119 70 - 130 mg/dL Final   05/17/2025 2110 187 (H) 70 - 130 mg/dL Final       XR Chest 1 View  Narrative: XR CHEST 1 VW-5/13/2025     HISTORY: Fatigue.     Heart size is mildly enlarged. Lungs appear free of acute infiltrates.  Cardiac pacemaker is seen in good position. There is mild thoracic  scoliosis. There is some aortic calcification.     Impression: 1. Cardiomegaly.  2. No acute infiltrates.        This report was finalized on 5/13/2025 11:36 AM by Dr. Fredrick Bennett M.D on Workstation: [id number]       I reviewed the patient's daily medications.  Scheduled Medications  apixaban, 5 mg, Oral, Q12H  ascorbic acid, 1,000 mg, Oral, Daily  carvedilol, 6.25 mg, Oral, BID With Meals  folic acid, 1,000 mcg, Oral, Daily  furosemide, 20 mg, Oral, Daily  insulin lispro, 2-7 Units, Subcutaneous, 4x Daily AC & at Bedtime  pantoprazole, 40 mg, Oral, QAM AC  vitamin B-12, 1,000 mcg, Oral, Daily    Infusions   Diet  Diet: Cardiac; Healthy Heart (2-3 Na+); Fluid Consistency: Thin (IDDSI 0)         I have personally reviewed:  [x]  Laboratory   []  Microbiology   [x]  Radiology   [x]  EKG/Telemetry   [x]  Cardiology/Vascular   []  Pathology   [x]  Records     Assessment/Plan     Active Hospital " Problems    Diagnosis  POA   • **Symptomatic anemia [D64.9]  Yes   • Type 2 diabetes mellitus with hyperglycemia [E11.65]  Yes   • Supratherapeutic INR [R79.1]  Yes   • Warfarin anticoagulation [Z79.01]  Not Applicable   • Sick sinus syndrome [I49.5]  Yes   • Essential hypertension [I10]  Yes   • Atrial fibrillation [I48.91]  Yes      Resolved Hospital Problems   No resolved problems to display.       89 y.o. male admitted with Symptomatic anemia.    Symptomatic anemia/BILLY/ABLA  - Has received 2 units of PRBC so far, hemoglobin slowly downtrending 7.1 today, will go ahead and give an additional unit  -GI consulted-conservative management   -PPI daily  -3d IV iron completed     Afib  Supratherapeutic INR, resolved  S/p PPM  -RC: Coreg  -AC: wafarin switched to Eliquis per Cardiology  -INR > 10 OA, s/p vitamin K and KCentra in ED     LE edema  - Lasix 20 mg daily, was on 10 mg at home prior to admission     Elevated troponin  -Trop 85 > 61; EKG no acute ischemia  -Likely demand related to severe anemia, not consistent with ACS  -Cardiology eval'd- no acute ischemic eval indicated     DM2 w/ hyperglycemia  -hold home PO meds  -SSI; monitor for hypoglycemia       SCDs for DVT prophylaxis.  Limited code (no CPR, no intubation).  Discussed with patient, nursing staff, and CCP.  Anticipate discharge to SNU facility in 1-2 days.    Expected Discharge Date: 5/19/2025; Expected Discharge Time:       Damien Costello MD  [facility]  05/19/25  10:54 EDT

## 2025-05-19 NOTE — THERAPY TREATMENT NOTE
Patient Name: Sacha Whitehead  : 1930    MRN: [id number]                              Today's Date: 2025       Admit Date: 2025    Visit Dx:     ICD-10-CM ICD-9-CM   1. Symptomatic anemia  D64.9 285.9   2. Gastrointestinal hemorrhage, unspecified gastrointestinal hemorrhage type  K92.2 578.9   3. Toxic effect of warfarin, unintentional, initial encounter  T45.511A 964.2     E858.2     Patient Active Problem List   Diagnosis    Atrial fibrillation    Diabetes type 2, controlled    Cerebrovascular accident (CVA) due to embolism of cerebral artery    Transient insomnia    Aneurysm of aortic root    Vascular disorder    Benign non-nodular prostatic hyperplasia with lower urinary tract symptoms    Gastroesophageal reflux disease    Essential hypertension    Memory loss    Microalbuminuria    Mitral valve insufficiency    Hypertrophic obstructive cardiomyopathy    Obstructive sleep apnea syndrome    Onychomycosis of toenail    Renal artery stenosis    Seizure disorder    Sick sinus syndrome    Tubular adenoma of colon    Vitamin D deficiency    SCC (squamous cell carcinoma), face    Thoracic aortic aneurysm without rupture    Warfarin anticoagulation    Melanoma    Prostate cancer    Bilateral hearing loss    Elevated blood uric acid level    Actinic keratosis    Nonrheumatic aortic valve insufficiency    History of stroke    Symptomatic anemia    Supratherapeutic INR    Type 2 diabetes mellitus with hyperglycemia     Past Medical History:   Diagnosis Date    Anemia     Aneurysm of abdominal aorta     Dr. Red    Aneurysm of aortic root     Dr. Red    Angiectasia     Bleeding in stomach; EGD 2014    Aortic valve insufficiency     Arthritis     Osteoarthritis    Atrial fibrillation     Permanent, per Dr. Red; on long-term Coumadin anticoagulation since around .    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    Enlarged prostate without lower urinary tract symptoms (luts)     Esophageal reflux     Facial droop     Right sided    Fatigue     Gastrointestinal hemorrhage 3/10/2016    Description: with angioectasia stomach 2013    Gout     H/O concentric left ventricular hypertrophy (LVH)     LVEF 50% documented on 06/14/2013 by echocardiogram.     Hx of being hospitalized     On 12/11/2013 with hemoglobin of 6 and heme-positive stool. EGD showed a telangiectasia which was lasered by Dr. Carolina Servin.     Hx of being hospitalized     On 04/04/2014 for surgical resection of bleeding at the anastomotic site in colon. Had surgical re-resection and discharged home.    Hx of being hospitalized     On 05/19/2014 with severe anemia.     Hx of being hospitalized     On 12/15/2014 with further resection of sites of possible blood loss based on angiography of the abdominal masses.     Hx of being hospitalized     At [facility] with a grand mal seizure and started on Keppra in 03/2015    Hypertension     Liver enzyme elevation 2/8/2019    Melanoma 8/28/2017    dx'd 2017     Memory loss     Microalbuminuria     Mitral regurgitation     Obstructive hypertrophic cardiomyopathy     Per Dr. Red    HUMAIRA on CPAP     Partial epilepsy with impairment of consciousness 5/13/2015    Overview:  2015 IMO UPDATE  Overview:  2015 IMO UPDATE    Peptic ulcer     On EGD 2014    PND (paroxysmal nocturnal dyspnea)     Reading comprehension disorder     Renal artery stenosis     Seizures     [facility]    Sick [facility] syndrome     With hx non-sustained Vtach s/p pacer and AICD    Stroke     Occipital stroke with vision loss in 07/2014.    Transient cerebral ischemia     Embolic    Tubular adenoma of colon     Transverse colon, s/p resection  (surgical), cause of severe FE deficiency anemia.    Ventricular tachycardia     Vitamin D deficiency      Past Surgical History:   Procedure Laterality Date    CARDIAC DEFIBRILLATOR PLACEMENT      CARDIAC ELECTROPHYSIOLOGY PROCEDURE N/A 10/28/2016    Procedure: ICD battery change;  Surgeon: Nael Marcial MD;  Location: [facility] CATH INVASIVE LOCATION;  Service:     CARDIAC PACEMAKER PLACEMENT      Pacemaker Permanent Placement    COLON SURGERY      Laparoscopy Partial Colectomy - Right, dx of tubulovillous adenoma    ENDOSCOPY N/A 8/29/2017    Procedure: ESOPHAGOGASTRODUODENOSCOPY WITH FOREIGN BODY REMOVAL;  Surgeon: Lorena Spencer MD;  Location: [facility] ENDOSCOPY;  Service:     ENDOSCOPY AND COLONOSCOPY      Showing tumor in the transverse colon.     ENDOSCOPY AND COLONOSCOPY  07/02/2014    EGD negative; colonoscopy with an anastomotic ulcer that was over sewn and cauterized.     EYE SURGERY      TOTAL KNEE ARTHROPLASTY Bilateral 2006      General Information       Row Name 05/19/25 1438          Physical Therapy Time and Intention    Document Type therapy note (daily note)  -     Mode of Treatment individual therapy;physical therapy  -       Row Name 05/19/25 1438          General Information    Patient Profile Reviewed yes  -     Existing Precautions/Restrictions fall   -     Barriers to Rehab hearing deficit  -       Row Name 05/19/25 1438          Cognition    Orientation Status (Cognition) oriented to;person;place  -       Row Name 05/19/25 1438          Safety Issues/Impairments Affecting Functional Mobility    Impairments Affecting Function (Mobility) endurance/activity tolerance;strength;balance;pain  -               User Key  (r) = Recorded By, (t) = Taken By, (c) = Cosigned By      Initials Name Provider Type    JL Blanche Corea PT Physical Therapist                   Mobility       Row Name 05/19/25 1439          Bed Mobility    Bed Mobility supine-sit;sit-supine  -JL     Supine-Sit  [place] (Bed Mobility) minimum assist (75% patient effort);moderate assist (50% patient effort);1 person assist  -JL     Sit-Supine [place] (Bed Mobility) moderate assist (50% patient effort);1 person assist  -JL     Assistive Device (Bed Mobility) bed rails;head of bed elevated  -JL     Comment, (Bed Mobility) Pt required PT's hand to pull himself up and BLE assistance for sit to supine transfer  -JL       Row Name 05/19/25 1439          Sit-Stand Transfer    Sit-Stand [place] (Transfers) contact guard;1 person assist;verbal cues  -JL     Assistive Device (Sit-Stand Transfers) walker, front-wheeled  -JL     Comment, (Sit-Stand Transfer) Heavily relies on UE to push down and stand up  -JL       Row Name 05/19/25 1439          Gait/Stairs (Locomotion)    [place] Level (Gait) contact guard;1 person assist  -JL     Assistive Device (Gait) walker, front-wheeled  -JL     Patient was able to Ambulate yes  -JL     Distance in Feet (Gait) 12  -JL     Deviations/Abnormal Patterns (Gait) mariano decreased;weight shifting decreased;gait speed decreased;stride length decreased  -JL     Bilateral Gait Deviations forward flexed posture  -JL               User Key  (r) = Recorded By, (t) = Taken By, (c) = Cosigned By      Initials Name Provider Type    Blanche Reyes PT Physical Therapist                   Obj/Interventions    No documentation.                  Goals/Plan    No documentation.                  Clinical Impression       Row Name 05/19/25 1441          Pain    Pain Side/Orientation generalized  -JL     Pre/Posttreatment Pain Comment Pt reports intermittent back pain but no numerical value given  -       Row Name 05/19/25 1441          Plan of Care Review    Plan of Care Reviewed With patient  -JL     Progress improving  -JL     Outcome Evaluation Patient was supine in bed when PT arrived. He was agreeable to PT. Pt was able to transfer supine to sitting EOB w/ min/mod A. Once at EOB, pt was  able to perform STS w/ CGA. He ambulated to hallway and turned around back to bed. Pt was easily fatigued but had no overt LOB. Pt returned to bed w/ mod A to BLE back to bed. Pt is encouraged to continue ther ex independently. PT recommendation at discharge is SNF rehab based on pt not at baseline to return home.  -       Row Name 05/19/25 1441          Therapy Assessment/Plan (PT)    Rehab Potential (PT) good  -JL     Criteria for Skilled Interventions Met (PT) yes;skilled treatment is necessary  -JL     Therapy Frequency (PT) 6 times/wk  -       Row Name 05/19/25 1441          Positioning and Restraints    Pre-Treatment Position in bed  -JL     Post Treatment Position bed  -JL     In Bed with nsg;call light within reach;encouraged to call for assist;exit alarm on;supine;side rails up x1  -JL               User Key  (r) = Recorded By, (t) = Taken By, (c) = Cosigned By      Initials Name Provider Type    Blanche Reyes PT Physical Therapist                   Outcome Measures       Row Name 05/19/25 1445          How much help from another person do you currently need...    Turning from your back to your side while in flat bed without using bedrails? 2  -JL     Moving from lying on back to sitting on the side of a flat bed without bedrails? 2  -JL     Moving to and from a bed to a chair (including a wheelchair)? 2  -JL     Standing up from a chair using your arms (e.g., wheelchair, bedside chair)? 3  -JL     Climbing 3-5 steps with a railing? 2  -JL     To walk in hospital room? 3  -JL     AM-PAC 6 Clicks Score (PT) 14  -JL     Highest Level of Mobility Goal Move to Chair/Commode-4  -JL               User Key  (r) = Recorded By, (t) = Taken By, (c) = Cosigned By      Initials Name Provider Type    Blanche Reyes PT Physical Therapist                                 Physical Therapy Education       Title: PT OT SLP Therapies (Done)       Topic: Physical Therapy (Done)       Point: Mobility training (Done)        Learning Progress Summary            Patient Acceptance, E, VU by JL at 5/19/2025 1445    Acceptance, E, VU by JS at 5/16/2025 1846    Acceptance, E,D, VU,NR by MS at 5/14/2025 1518                      Point: Home exercise program (Done)       Learning Progress Summary            Patient Acceptance, E, VU by JL at 5/19/2025 1445    Acceptance, E, VU by JS at 5/16/2025 1846    Acceptance, E,D, NR by MS at 5/16/2025 1522    Acceptance, E,D, VU,NR by MS at 5/14/2025 1518                      Point: Body mechanics (Done)       Learning Progress Summary            Patient Acceptance, E, VU by IZABEL at 5/19/2025 1445    Acceptance, E, VU by JS at 5/16/2025 1846    Acceptance, E,D, VU,NR by MS at 5/14/2025 1518                      Point: Precautions (Done)       Learning Progress Summary            Patient Acceptance, E, VU by IZABEL at 5/19/2025 1445    Acceptance, E, VU by JS at 5/16/2025 1846    Acceptance, E,D, VU,NR by MS at 5/14/2025 1518                                      User Key       Initials Effective Dates Name Provider Type Discipline    MS 06/16/21 -  Usama Martínez, PT Physical Therapist PT     02/26/24 -  Rigoberto Paris, RN Registered Nurse Nurse     01/16/24 -  Blanche Corea PT Physical Therapist PT                  PT Recommendation and Plan     Progress: improving  Outcome Evaluation: Patient was supine in bed when PT arrived. He was agreeable to PT. Pt was able to transfer supine to sitting EOB w/ min/mod A. Once at EOB, pt was able to perform STS w/ CGA. He ambulated to hallway and turned around back to bed. Pt was easily fatigued but had no overt LOB. Pt returned to bed w/ mod A to BLE back to bed. Pt is encouraged to continue ther ex independently. PT recommendation at discharge is SNF rehab based on pt not at baseline to return home.     Time Calculation:         PT Charges       Row Name 05/19/25 1446             Time Calculation    Start Time 1422  -      Stop Time 1436  -       Time Calculation (min) 14 min  -JL      PT Received On 05/19/25  -IZABEL      PT - Next Appointment 05/20/25  -IZABEL      PT Goal Re-Cert Due Date 05/21/25  -IZABEL         Time Calculation- PT    Total Timed Code Minutes- PT 14 minute(s)  -JL         Timed Charges    85188 - PT Therapeutic Activity Minutes 14  -JL         Total Minutes    Timed Charges Total Minutes 14  -JL       Total Minutes 14  -JL                User Key  (r) = Recorded By, (t) = Taken By, (c) = Cosigned By      Initials Name Provider Type    Blanche Reyes, PT Physical Therapist                  Therapy Charges for Today       Code Description Service Date Service Provider Modifiers Qty    22888371158 HC PT THERAPEUTIC ACT EA 15 MIN 5/19/2025 Blanche Corea, BERTRAND GP 1            PT G-Codes  Outcome Measure Options: AM-PAC 6 Clicks Basic Mobility (PT)  AM-PAC 6 Clicks Score (PT): 14  AM-PAC 6 Clicks Score (OT): 13  PT Discharge Summary  Anticipated Discharge Disposition (PT): skilled nursing facility    Blanche Corea PT  5/19/2025

## 2025-05-19 NOTE — PLAN OF CARE
Goal Outcome Evaluation:  Plan of Care Reviewed With: patient        Progress: no change  Outcome Evaluation: Pt resting in bed at this time, completed prbc transfusion x1 u, sekou well, vss, deneis any discomfort, eating fair, amb with PT and walker, Turned to sides, man hearing aids in used. and afib on tele, room air satting 94%

## 2025-05-20 LAB
ANION GAP SERPL CALCULATED.3IONS-SCNC: 7 MMOL/L (ref 5–15)
BASOPHILS # BLD AUTO: 0.01 10*3/MM3 (ref 0–0.2)
BASOPHILS NFR BLD AUTO: 0.2 % (ref 0–1.5)
BH BB BLOOD EXPIRATION DATE: NORMAL
BH BB BLOOD TYPE BARCODE: 6200
BH BB DISPENSE STATUS: NORMAL
BH BB PRODUCT CODE: NORMAL
BH BB UNIT NUMBER: NORMAL
BUN SERPL-MCNC: 27 MG/DL (ref 8–23)
BUN/CREAT SERPL: 28.7 (ref 7–25)
CALCIUM SPEC-SCNC: 9.2 MG/DL (ref 8.2–9.6)
CHLORIDE SERPL-SCNC: 107 MMOL/L (ref 98–107)
CO2 SERPL-SCNC: 28 MMOL/L (ref 22–29)
CREAT SERPL-MCNC: 0.94 MG/DL (ref 0.76–1.27)
CROSSMATCH INTERPRETATION: NORMAL
DEPRECATED RDW RBC AUTO: 63 FL (ref 37–54)
EGFRCR SERPLBLD CKD-EPI 2021: 74.7 ML/MIN/1.73
EOSINOPHIL # BLD AUTO: 0.14 10*3/MM3 (ref 0–0.4)
EOSINOPHIL NFR BLD AUTO: 2.3 % (ref 0.3–6.2)
ERYTHROCYTE [DISTWIDTH] IN BLOOD BY AUTOMATED COUNT: 18.8 % (ref 12.3–15.4)
GLUCOSE BLDC GLUCOMTR-MCNC: 152 MG/DL (ref 70–130)
GLUCOSE BLDC GLUCOMTR-MCNC: 171 MG/DL (ref 70–130)
GLUCOSE BLDC GLUCOMTR-MCNC: 177 MG/DL (ref 70–130)
GLUCOSE BLDC GLUCOMTR-MCNC: 185 MG/DL (ref 70–130)
GLUCOSE SERPL-MCNC: 125 MG/DL (ref 65–99)
HCT VFR BLD AUTO: 24.4 % (ref 37.5–51)
HGB BLD-MCNC: 7.5 G/DL (ref 13–17.7)
IMM GRANULOCYTES # BLD AUTO: 0.04 10*3/MM3 (ref 0–0.05)
IMM GRANULOCYTES NFR BLD AUTO: 0.7 % (ref 0–0.5)
LYMPHOCYTES # BLD AUTO: 0.76 10*3/MM3 (ref 0.7–3.1)
LYMPHOCYTES NFR BLD AUTO: 12.4 % (ref 19.6–45.3)
MCH RBC QN AUTO: 29.2 PG (ref 26.6–33)
MCHC RBC AUTO-ENTMCNC: 30.7 G/DL (ref 31.5–35.7)
MCV RBC AUTO: 94.9 FL (ref 79–97)
MONOCYTES # BLD AUTO: 0.94 10*3/MM3 (ref 0.1–0.9)
MONOCYTES NFR BLD AUTO: 15.3 % (ref 5–12)
NEUTROPHILS NFR BLD AUTO: 4.26 10*3/MM3 (ref 1.7–7)
NEUTROPHILS NFR BLD AUTO: 69.1 % (ref 42.7–76)
PLATELET # BLD AUTO: 153 10*3/MM3 (ref 140–450)
PMV BLD AUTO: 11.1 FL (ref 6–12)
POTASSIUM SERPL-SCNC: 4.3 MMOL/L (ref 3.5–5.2)
RBC # BLD AUTO: 2.57 10*6/MM3 (ref 4.14–5.8)
SODIUM SERPL-SCNC: 142 MMOL/L (ref 136–145)
UNIT  ABO: NORMAL
UNIT  RH: NORMAL
WBC NRBC COR # BLD AUTO: 6.15 10*3/MM3 (ref 3.4–10.8)

## 2025-05-20 PROCEDURE — 82948 REAGENT STRIP/BLOOD GLUCOSE: CPT

## 2025-05-20 PROCEDURE — 80048 BASIC METABOLIC PNL TOTAL CA: CPT | Performed by: STUDENT IN AN ORGANIZED HEALTH CARE EDUCATION/TRAINING PROGRAM

## 2025-05-20 PROCEDURE — 97530 THERAPEUTIC ACTIVITIES: CPT

## 2025-05-20 PROCEDURE — 63710000001 INSULIN LISPRO (HUMAN) PER 5 UNITS: Performed by: STUDENT IN AN ORGANIZED HEALTH CARE EDUCATION/TRAINING PROGRAM

## 2025-05-20 PROCEDURE — 36415 COLL VENOUS BLD VENIPUNCTURE: CPT | Performed by: STUDENT IN AN ORGANIZED HEALTH CARE EDUCATION/TRAINING PROGRAM

## 2025-05-20 PROCEDURE — 85025 COMPLETE CBC W/AUTO DIFF WBC: CPT | Performed by: STUDENT IN AN ORGANIZED HEALTH CARE EDUCATION/TRAINING PROGRAM

## 2025-05-20 RX ORDER — ACETAMINOPHEN 500 MG
1000 TABLET ORAL EVERY 8 HOURS PRN
Status: DISCONTINUED | OUTPATIENT
Start: 2025-05-20 | End: 2025-05-22 | Stop reason: HOSPADM

## 2025-05-20 RX ADMIN — APIXABAN 5 MG: 5 TABLET, FILM COATED ORAL at 20:00

## 2025-05-20 RX ADMIN — CARVEDILOL 6.25 MG: 6.25 TABLET, FILM COATED ORAL at 18:16

## 2025-05-20 RX ADMIN — OXYCODONE HYDROCHLORIDE AND ACETAMINOPHEN 1000 MG: 500 TABLET ORAL at 09:50

## 2025-05-20 RX ADMIN — FUROSEMIDE 20 MG: 40 TABLET ORAL at 09:50

## 2025-05-20 RX ADMIN — Medication 1000 MCG: at 09:50

## 2025-05-20 RX ADMIN — INSULIN LISPRO 2 UNITS: 100 INJECTION, SOLUTION INTRAVENOUS; SUBCUTANEOUS at 18:16

## 2025-05-20 RX ADMIN — PANTOPRAZOLE SODIUM 40 MG: 40 TABLET, DELAYED RELEASE ORAL at 06:36

## 2025-05-20 RX ADMIN — ACETAMINOPHEN 1000 MG: 500 TABLET ORAL at 13:17

## 2025-05-20 RX ADMIN — FOLIC ACID 1000 MCG: 1 TABLET ORAL at 09:50

## 2025-05-20 RX ADMIN — APIXABAN 5 MG: 5 TABLET, FILM COATED ORAL at 09:50

## 2025-05-20 RX ADMIN — INSULIN LISPRO 2 UNITS: 100 INJECTION, SOLUTION INTRAVENOUS; SUBCUTANEOUS at 21:37

## 2025-05-20 RX ADMIN — INSULIN LISPRO 2 UNITS: 100 INJECTION, SOLUTION INTRAVENOUS; SUBCUTANEOUS at 13:00

## 2025-05-20 RX ADMIN — CARVEDILOL 6.25 MG: 6.25 TABLET, FILM COATED ORAL at 09:50

## 2025-05-20 RX ADMIN — INSULIN LISPRO 2 UNITS: 100 INJECTION, SOLUTION INTRAVENOUS; SUBCUTANEOUS at 09:00

## 2025-05-20 NOTE — PROGRESS NOTES
Name: Sacha Whitehead ADMIT: 2025   : 1930  PCP: Alvina Hauser MD    MRN: [id number] LOS: 7 days   AGE/SEX: 89 y.o. male  ROOM: [facility]     Subjective   Subjective   Patient is hard of hearing.  No complaints.  No melena or hematochezia.  Tolerating diet without any nausea or vomiting.    Review of Systems  As above     Objective   Objective   Vital Signs  Temp:  [97.3 °F (36.3 °C)-97.9 °F (36.6 °C)] 97.3 °F (36.3 °C)  Heart Rate:  [66-74] 66  Resp:  [18] 18  BP: (107-124)/(41-77) 119/77  SpO2:  [93 %-96 %] 93 %  on   ;   Device (Oxygen Therapy): room air  Body mass index is 32.08 kg/m².  Physical Exam  Vitals and nursing note reviewed.   Constitutional:       General: He is not in acute distress.     Comments: Elderly, frail   Cardiovascular:      Rate and Rhythm: Normal rate and regular rhythm.   Pulmonary:      Effort: Pulmonary effort is normal. No respiratory distress.   Abdominal:      General: Abdomen is flat. There is no distension.      Tenderness: There is no abdominal tenderness.   Musculoskeletal:         General: No swelling or deformity.   Skin:     General: Skin is warm and dry.   Neurological:      General: No focal deficit present.      Mental Status: He is alert. Mental status is at baseline.         Results Review     I reviewed the patient's new clinical results.  Results from last 7 days   Lab Units 25  0534 25  2209 25  0611 25  0616 25  0402   WBC 10*3/mm3 6.15  --  6.13 6.10 5.54   HEMOGLOBIN g/dL 7.5* 7.3* 7.1* 7.2* 7.5*   PLATELETS 10*3/mm3 153  --  149 155 156     Results from last 7 days   Lab Units 25  0534 25  0611 25  0616 25  0402   SODIUM mmol/L 142 142 140 143   POTASSIUM mmol/L 4.3 4.4 4.1 4.4   CHLORIDE mmol/L 107 106 103 109*   CO2 mmol/L 28.0 29.6* 28.0 28.0   BUN mg/dL 27* 27* 29* 28*   CREATININE mg/dL 0.94 1.00 1.15 1.06   GLUCOSE mg/dL 125* 119* 113* 119*   Estimated Creatinine Clearance: 56 mL/min (by  "C-G formula based on SCr of 0.94 mg/dL).        Results from last 7 days   Lab Units 05/20/25  0534 05/19/25  0611 05/18/25  0616 05/17/25  0402   CALCIUM mg/dL 9.2 9.7* 9.6 9.0     No results found for: \"COVID19\"  Glucose   Date/Time Value Ref Range Status   05/20/2025 1526 171 (H) 70 - 130 mg/dL Final   05/20/2025 1027 177 (H) 70 - 130 mg/dL Final   05/20/2025 0603 152 (H) 70 - 130 mg/dL Final   05/19/2025 2004 115 70 - 130 mg/dL Final   05/19/2025 1533 155 (H) 70 - 130 mg/dL Final   05/19/2025 1026 193 (H) 70 - 130 mg/dL Final   05/19/2025 0616 127 70 - 130 mg/dL Final       XR Chest 1 View  Narrative: XR CHEST 1 VW-5/13/2025     HISTORY: Fatigue.     Heart size is mildly enlarged. Lungs appear free of acute infiltrates.  Cardiac pacemaker is seen in good position. There is mild thoracic  scoliosis. There is some aortic calcification.     Impression: 1. Cardiomegaly.  2. No acute infiltrates.        This report was finalized on 5/13/2025 11:36 AM by Dr. Fredrick Bennett M.D on Workstation: [id number]       I reviewed the patient's daily medications.  Scheduled Medications  apixaban, 5 mg, Oral, Q12H  ascorbic acid, 1,000 mg, Oral, Daily  carvedilol, 6.25 mg, Oral, BID With Meals  folic acid, 1,000 mcg, Oral, Daily  furosemide, 20 mg, Oral, Daily  insulin lispro, 2-7 Units, Subcutaneous, 4x Daily AC & at Bedtime  pantoprazole, 40 mg, Oral, QAM AC  vitamin B-12, 1,000 mcg, Oral, Daily    Infusions   Diet  Diet: Cardiac; Healthy Heart (2-3 Na+); Fluid Consistency: Thin (IDDSI 0)         I have personally reviewed:  [x]  Laboratory   []  Microbiology   [x]  Radiology   [x]  EKG/Telemetry   [x]  Cardiology/Vascular   []  Pathology   [x]  Records     Assessment/Plan     Active Hospital Problems    Diagnosis  POA    **Symptomatic anemia [D64.9]  Yes    Type 2 diabetes mellitus with hyperglycemia [E11.65]  Yes    Supratherapeutic INR [R79.1]  Yes    Warfarin anticoagulation [Z79.01]  Not Applicable    Sick sinus " syndrome [I49.5]  Yes    Essential hypertension [I10]  Yes    Atrial fibrillation [I48.91]  Yes      Resolved Hospital Problems   No resolved problems to display.       89 y.o. male admitted with Symptomatic anemia.    Symptomatic anemia/BILLY/ABLA  - Has received 3 units of PRBC so far, last transfusion on 5/21, hemoglobin stable   -GI consulted-conservative management   -PPI daily  -3d IV iron completed     Afib  Supratherapeutic INR, resolved  S/p PPM  -RC: Coreg  -AC: wafarin switched to Eliquis per Cardiology  -INR > 10 OA, s/p vitamin K and KCentra in ED     LE edema  - Lasix 20 mg daily, was on 10 mg at home prior to admission     Elevated troponin  -Trop 85 > 61; EKG no acute ischemia  -Likely demand related to severe anemia, not consistent with ACS  -Cardiology eval'd- no acute ischemic eval indicated     DM2 w/ hyperglycemia  -hold home PO meds  -SSI; monitor for hypoglycemia       SCDs for DVT prophylaxis.  Limited code (no CPR, no intubation).  Discussed with patient, nursing staff, and CCP.  Anticipate discharge to SNU facility in 1-2 days.    Expected Discharge Date: 5/21/2025; Expected Discharge Time:       Damien Costello MD  [facility]  05/20/25  15:27 EDT

## 2025-05-20 NOTE — PROGRESS NOTES
"Nutrition Services    Patient Name: Sacha Whitehead  YOB: 1930  MRN: [id number]  Admission date: 5/13/2025    PROGRESS NOTE      Encounter Information: Follow up       PO Diet: Diet: Cardiac; Healthy Heart (2-3 Na+); Fluid Consistency: Thin (IDDSI 0)   PO Supplements: Boost daily   PO Intake:  75%       Current nutrition support: --   Nutrition support review: --       Weight: Weight: 101 kg (223 lb 8.7 oz) (05/20/25 0543)       Medications: reviewed   Labs: reviewed Glu 177, bun 27   Skin Gluteal, right lower leg   GI Function:  last bowel movement: 5/14       Nutrition Intervention Updates: Will monitor po intake  Will provide daily supplements  RD to follow       Results from last 7 days   Lab Units 05/20/25  0534 05/19/25  0611 05/18/25  0616   SODIUM mmol/L 142 142 140   POTASSIUM mmol/L 4.3 4.4 4.1   CHLORIDE mmol/L 107 106 103   CO2 mmol/L 28.0 29.6* 28.0   BUN mg/dL 27* 27* 29*   CREATININE mg/dL 0.94 1.00 1.15   CALCIUM mg/dL 9.2 9.7* 9.6   GLUCOSE mg/dL 125* 119* 113*     Results from last 7 days   Lab Units 05/20/25  0534   HEMOGLOBIN g/dL 7.5*   HEMATOCRIT % 24.4*     No results found for: \"COVID19\"  Lab Results   Component Value Date    HGBA1C 6.00 (H) 05/13/2025       RD to follow up per protocol.    Electronically signed by:  Natalie Gomez RD  05/20/25 12:27 EDT  "

## 2025-05-20 NOTE — THERAPY TREATMENT NOTE
Patient Name: Sacha Whitehead  : 1930    MRN: [id number]                              Today's Date: 2025       Admit Date: 2025    Visit Dx:     ICD-10-CM ICD-9-CM   1. Symptomatic anemia  D64.9 285.9   2. Gastrointestinal hemorrhage, unspecified gastrointestinal hemorrhage type  K92.2 578.9   3. Toxic effect of warfarin, unintentional, initial encounter  T45.511A 964.2     E858.2     Patient Active Problem List   Diagnosis    Atrial fibrillation    Diabetes type 2, controlled    Cerebrovascular accident (CVA) due to embolism of cerebral artery    Transient insomnia    Aneurysm of aortic root    Vascular disorder    Benign non-nodular prostatic hyperplasia with lower urinary tract symptoms    Gastroesophageal reflux disease    Essential hypertension    Memory loss    Microalbuminuria    Mitral valve insufficiency    Hypertrophic obstructive cardiomyopathy    Obstructive sleep apnea syndrome    Onychomycosis of toenail    Renal artery stenosis    Seizure disorder    Sick sinus syndrome    Tubular adenoma of colon    Vitamin D deficiency    SCC (squamous cell carcinoma), face    Thoracic aortic aneurysm without rupture    Warfarin anticoagulation    Melanoma    Prostate cancer    Bilateral hearing loss    Elevated blood uric acid level    Actinic keratosis    Nonrheumatic aortic valve insufficiency    History of stroke    Symptomatic anemia    Supratherapeutic INR    Type 2 diabetes mellitus with hyperglycemia     Past Medical History:   Diagnosis Date    Anemia     Aneurysm of abdominal aorta     Dr. Red    Aneurysm of aortic root     Dr. Red    Angiectasia     Bleeding in stomach; EGD 2014    Aortic valve insufficiency     Arthritis     Osteoarthritis    Atrial fibrillation     Permanent, per Dr. Red; on long-term Coumadin anticoagulation since around .    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    Enlarged prostate without lower urinary tract symptoms (luts)     Esophageal reflux     Facial droop     Right sided    Fatigue     Gastrointestinal hemorrhage 3/10/2016    Description: with angioectasia stomach 2013    Gout     H/O concentric left ventricular hypertrophy (LVH)     LVEF 50% documented on 06/14/2013 by echocardiogram.     Hx of being hospitalized     On 12/11/2013 with hemoglobin of 6 and heme-positive stool. EGD showed a telangiectasia which was lasered by Dr. Carolina Servin.     Hx of being hospitalized     On 04/04/2014 for surgical resection of bleeding at the anastomotic site in colon. Had surgical re-resection and discharged home.    Hx of being hospitalized     On 05/19/2014 with severe anemia.     Hx of being hospitalized     On 12/15/2014 with further resection of sites of possible blood loss based on angiography of the abdominal masses.     Hx of being hospitalized     At [facility] with a grand mal seizure and started on Keppra in 03/2015    Hypertension     Liver enzyme elevation 2/8/2019    Melanoma 8/28/2017    dx'd 2017     Memory loss     Microalbuminuria     Mitral regurgitation     Obstructive hypertrophic cardiomyopathy     Per Dr. Red    HUMAIRA on CPAP     Partial epilepsy with impairment of consciousness 5/13/2015    Overview:  2015 IMO UPDATE  Overview:  2015 IMO UPDATE    Peptic ulcer     On EGD 2014    PND (paroxysmal nocturnal dyspnea)     Reading comprehension disorder     Renal artery stenosis     Seizures     [facility]    Sick sinus syndrome     With hx non-sustained Vtach s/p pacer and AICD    Stroke     Occipital stroke with vision loss in 07/2014.    Transient cerebral ischemia     Embolic    Tubular adenoma of colon     Transverse colon, s/p resection  (surgical), cause of severe FE deficiency anemia.    Ventricular tachycardia     Vitamin D deficiency      Past Surgical History:   Procedure Laterality Date    CARDIAC DEFIBRILLATOR PLACEMENT      CARDIAC ELECTROPHYSIOLOGY PROCEDURE N/A 10/28/2016    Procedure: ICD battery change;  Surgeon: Nael Marcial MD;  Location: [facility] CATH INVASIVE LOCATION;  Service:     CARDIAC PACEMAKER PLACEMENT      Pacemaker Permanent Placement    COLON SURGERY      Laparoscopy Partial Colectomy - Right, dx of tubulovillous adenoma    ENDOSCOPY N/A 8/29/2017    Procedure: ESOPHAGOGASTRODUODENOSCOPY WITH FOREIGN BODY REMOVAL;  Surgeon: Lorena Spencer MD;  Location: [facility] ENDOSCOPY;  Service:     ENDOSCOPY AND COLONOSCOPY      Showing tumor in the transverse colon.     ENDOSCOPY AND COLONOSCOPY  07/02/2014    EGD negative; colonoscopy with an anastomotic ulcer that was over sewn and cauterized.     EYE SURGERY      TOTAL KNEE ARTHROPLASTY Bilateral 2006      General Information       Row Name 05/20/25 1459          OT Time and Intention    Document Type therapy note (daily note)  -MM     Mode of Treatment occupational therapy;individual therapy  -MM     Patient Effort fair  -MM     Symptoms Noted During/After Treatment increased pain  -MM     Comment LLE  -MM       Row Name 05/20/25 9822          General Information    Patient Profile Reviewed yes  -MM     Existing Precautions/Restrictions fall  -MM       Row Name 05/20/25 2286          Cognition    Orientation Status (Cognition) oriented to;person;place  -MM       Row Name 05/20/25 3084          Safety Issues/Impairments Affecting Functional Mobility    Impairments Affecting Function (Mobility) endurance/activity tolerance;strength;balance;pain  -MM               User Key  (r) = Recorded By, (t) = Taken By, (c) = Cosigned By      Initials Name Provider Type    MM Jolene Mondragon OT Occupational Therapist                     Mobility/ADL's       Row Name 05/20/25 1962          Bed  Mobility    Bed Mobility supine-sit;sit-supine  -MM     Supine-Sit [place] (Bed Mobility) contact guard  -MM     Sit-Supine [place] (Bed Mobility) moderate assist (50% patient effort)  -MM     Assistive Device (Bed Mobility) bed rails;head of bed elevated  -MM     Comment, (Bed Mobility) increased time, LLE pain limiting this date, pt reports new onset this AM 2/2 arthritis  -MM       Row Name 05/20/25 1455          Transfers    Transfers sit-stand transfer  -MM       Row Name 05/20/25 1455          Sit-Stand Transfer    Sit-Stand [place] (Transfers) contact guard;1 person assist;verbal cues  -MM     Assistive Device (Sit-Stand Transfers) walker, front-wheeled  -MM     Comment, (Sit-Stand Transfer) x2 STS, bed elevated, able to demo couple side steps however unable to progress away from bed due to LLE pain  -MM       Row Name 05/20/25 1455          Activities of Daily Living    BADL Assessment/Intervention lower body dressing  -MM       Row Name 05/20/25 1455          Lower Body Dressing Assessment/Training    Comment, (Lower Body Dressing) able to adjust socks with CGA sitting EOB  -MM               User Key  (r) = Recorded By, (t) = Taken By, (c) = Cosigned By      Initials Name Provider Type    MM Jolene Mondragon OT Occupational Therapist                   Obj/Interventions       Row Name 05/20/25 1459          Motor Skills    Motor Skills functional endurance  -MM     Functional Endurance fair, limited by pain this date  -MM       Row Name 05/20/25 1459          Balance    Balance Assessment sitting static balance;sitting dynamic balance;standing static balance;standing dynamic balance;sit to stand dynamic balance  -MM     Static Sitting Balance standby assist  -MM     Dynamic Sitting Balance standby assist  -MM     Position, Sitting Balance sitting edge of bed;unsupported  -MM     Sit to Stand Dynamic Balance contact guard  -MM     Static Standing Balance contact guard  -MM     Dynamic Standing  Balance contact guard;minimal assist  -MM     Position/Device Used, Standing Balance supported;walker, front-wheeled  -MM     Balance Interventions sitting;standing;sit to stand;supported;static;dynamic;moderate challenge;weight shifting activity;occupation based/functional task  -MM     Comment, Balance increased unsteadiness due to LLE pain  -MM               User Key  (r) = Recorded By, (t) = Taken By, (c) = Cosigned By      Initials Name Provider Type    Jolene Arambula OT Occupational Therapist                   Goals/Plan    No documentation.                  Clinical Impression       Row Name 05/20/25 1500          Pain Assessment    Pretreatment Pain Rating 8/10  -MM     Posttreatment Pain Rating 8/10  -MM     Pain Location extremity  -MM     Pain Side/Orientation left;lower  -MM     Pre/Posttreatment Pain Comment Rn aware following session  -MM       Row Name 05/20/25 1500          Plan of Care Review    Plan of Care Reviewed With patient  -MM     Progress no change  -MM     Outcome Evaluation pt seen this date for OT, he is agreeable to OOB activity however overall limited due to LLE pain. He reports it is arthritis. Unable to progress with functional mobility away from bed however able to demo STS with CGA and CGA/min A for lateral steps with rwx. LBD completed with CGA at EOB. Able to scoot at EOB with SBA. Continue to progress as pt able to participate, rec SNF  -MM       Row Name 05/20/25 1500          Therapy Plan Review/Discharge Plan (OT)    Anticipated Discharge Disposition (OT) skilled nursing facility  -MM       Row Name 05/20/25 1500          Vital Signs    O2 Delivery Pre Treatment room air  -MM       Row Name 05/20/25 1500          Positioning and Restraints    Pre-Treatment Position in bed  -MM     Post Treatment Position bed  -MM     In Bed notified nsg;fowlers;call light within reach;encouraged to call for assist;exit alarm on;side rails up x3  -MM               User Key  (r) = Recorded  By, (t) = Taken By, (c) = Cosigned By      Initials Name Provider Type    Jolene Arambula OT Occupational Therapist                   Outcome Measures       Row Name 05/20/25 1504          How much help from another is currently needed...    Putting on and taking off regular lower body clothing? 2  -MM     Bathing (including washing, rinsing, and drying) 2  -MM     Toileting (which includes using toilet bed pan or urinal) 2  -MM     Putting on and taking off regular upper body clothing 3  -MM     Taking care of personal grooming (such as brushing teeth) 3  -MM     Eating meals 4  -MM     AM-PAC 6 Clicks Score (OT) 16  -MM       Row Name 05/20/25 1504          Functional Assessment    Outcome Measure Options AM-PAC 6 Clicks Daily Activity (OT)  -MM               User Key  (r) = Recorded By, (t) = Taken By, (c) = Cosigned By      Initials Name Provider Type    Jolene Arambula OT Occupational Therapist                      OT Recommendation and Plan     Plan of Care Review  Plan of Care Reviewed With: patient  Progress: no change  Outcome Evaluation: pt seen this date for OT, he is agreeable to OOB activity however overall limited due to LLE pain. He reports it is arthritis. Unable to progress with functional mobility away from bed however able to demo STS with CGA and CGA/min A for lateral steps with rwx. LBD completed with CGA at EOB. Able to scoot at EOB with SBA. Continue to progress as pt able to participate, rec SNF     Time Calculation:         Time Calculation- OT       Row Name 05/20/25 1504             Time Calculation- OT    OT Start Time 1325  -MM      OT Stop Time 1342  -MM      OT Time Calculation (min) 17 min  -MM      Total Timed Code Minutes- OT 17 minute(s)  -MM      OT Received On 05/20/25  -MM      OT - Next Appointment 05/21/25  -MM         Timed Charges    33586 - OT Therapeutic Activity Minutes 17  -MM         Total Minutes    Timed Charges Total Minutes 17  -MM       Total Minutes 17  -MM                 User Key  (r) = Recorded By, (t) = Taken By, (c) = Cosigned By      Initials Name Provider Type    MM Jolene Mondragon OT Occupational Therapist                  Therapy Charges for Today       Code Description Service Date Service Provider Modifiers Qty    [id number]  OT THERAPEUTIC ACT EA 15 MIN 5/20/2025 Jolene Mondragon OT GO 1                 Jolene Mondragon OT  5/20/2025

## 2025-05-20 NOTE — PLAN OF CARE
Goal Outcome Evaluation:  Plan of Care Reviewed With: patient        Progress: no change  Outcome Evaluation: pt seen this date for OT, he is agreeable to OOB activity however overall limited due to LLE pain. He reports it is arthritis. Unable to progress with functional mobility away from bed however able to demo STS with CGA and CGA/min A for lateral steps with rwx. LBD completed with CGA at EOB. Able to scoot at EOB with SBA. Continue to progress as pt able to participate, rec SNF    Anticipated Discharge Disposition (OT): skilled nursing facility

## 2025-05-20 NOTE — PLAN OF CARE
Goal Outcome Evaluation:         Pt resting in bed, Aox3 , forgetful , VSS, A fib on tele , Room air. Turn q 2. Plan of care is ongoing.

## 2025-05-20 NOTE — SIGNIFICANT NOTE
05/20/25 1503   OTHER   Discipline physical therapist   Rehab Time/Intention   Session Not Performed other (see comments)  (Pt working w/ OT this afternoon, then politely declined due to LLE pain. PT to follow up next service date for tx as appropriate.)   Therapy Assessment/Plan (PT)   Criteria for Skilled Interventions Met (PT) yes;skilled treatment is necessary   Recommendation   PT - Next Appointment 05/21/25

## 2025-05-21 LAB
ANION GAP SERPL CALCULATED.3IONS-SCNC: 5.8 MMOL/L (ref 5–15)
BASOPHILS # BLD AUTO: 0.01 10*3/MM3 (ref 0–0.2)
BASOPHILS NFR BLD AUTO: 0.2 % (ref 0–1.5)
BUN SERPL-MCNC: 29 MG/DL (ref 8–23)
BUN/CREAT SERPL: 33 (ref 7–25)
CALCIUM SPEC-SCNC: 9.3 MG/DL (ref 8.2–9.6)
CHLORIDE SERPL-SCNC: 105 MMOL/L (ref 98–107)
CO2 SERPL-SCNC: 30.2 MMOL/L (ref 22–29)
CREAT SERPL-MCNC: 0.88 MG/DL (ref 0.76–1.27)
DEPRECATED RDW RBC AUTO: 65.9 FL (ref 37–54)
EGFRCR SERPLBLD CKD-EPI 2021: 79.2 ML/MIN/1.73
EOSINOPHIL # BLD AUTO: 0.17 10*3/MM3 (ref 0–0.4)
EOSINOPHIL NFR BLD AUTO: 2.9 % (ref 0.3–6.2)
ERYTHROCYTE [DISTWIDTH] IN BLOOD BY AUTOMATED COUNT: 19.1 % (ref 12.3–15.4)
GLUCOSE BLDC GLUCOMTR-MCNC: 121 MG/DL (ref 70–130)
GLUCOSE BLDC GLUCOMTR-MCNC: 125 MG/DL (ref 70–130)
GLUCOSE BLDC GLUCOMTR-MCNC: 197 MG/DL (ref 70–130)
GLUCOSE BLDC GLUCOMTR-MCNC: 225 MG/DL (ref 70–130)
GLUCOSE SERPL-MCNC: 117 MG/DL (ref 65–99)
HCT VFR BLD AUTO: 23.2 % (ref 37.5–51)
HGB BLD-MCNC: 7.2 G/DL (ref 13–17.7)
IMM GRANULOCYTES # BLD AUTO: 0.04 10*3/MM3 (ref 0–0.05)
IMM GRANULOCYTES NFR BLD AUTO: 0.7 % (ref 0–0.5)
LYMPHOCYTES # BLD AUTO: 0.87 10*3/MM3 (ref 0.7–3.1)
LYMPHOCYTES NFR BLD AUTO: 14.8 % (ref 19.6–45.3)
MCH RBC QN AUTO: 30 PG (ref 26.6–33)
MCHC RBC AUTO-ENTMCNC: 31 G/DL (ref 31.5–35.7)
MCV RBC AUTO: 96.7 FL (ref 79–97)
MONOCYTES # BLD AUTO: 0.96 10*3/MM3 (ref 0.1–0.9)
MONOCYTES NFR BLD AUTO: 16.3 % (ref 5–12)
NEUTROPHILS NFR BLD AUTO: 3.84 10*3/MM3 (ref 1.7–7)
NEUTROPHILS NFR BLD AUTO: 65.1 % (ref 42.7–76)
PLATELET # BLD AUTO: 150 10*3/MM3 (ref 140–450)
PMV BLD AUTO: 10.7 FL (ref 6–12)
POTASSIUM SERPL-SCNC: 4.2 MMOL/L (ref 3.5–5.2)
RBC # BLD AUTO: 2.4 10*6/MM3 (ref 4.14–5.8)
SODIUM SERPL-SCNC: 141 MMOL/L (ref 136–145)
WBC NRBC COR # BLD AUTO: 5.89 10*3/MM3 (ref 3.4–10.8)

## 2025-05-21 PROCEDURE — 82948 REAGENT STRIP/BLOOD GLUCOSE: CPT

## 2025-05-21 PROCEDURE — P9016 RBC LEUKOCYTES REDUCED: HCPCS

## 2025-05-21 PROCEDURE — 99222 1ST HOSP IP/OBS MODERATE 55: CPT | Performed by: INTERNAL MEDICINE

## 2025-05-21 PROCEDURE — 63710000001 INSULIN LISPRO (HUMAN) PER 5 UNITS: Performed by: STUDENT IN AN ORGANIZED HEALTH CARE EDUCATION/TRAINING PROGRAM

## 2025-05-21 PROCEDURE — 86900 BLOOD TYPING SEROLOGIC ABO: CPT

## 2025-05-21 PROCEDURE — 36415 COLL VENOUS BLD VENIPUNCTURE: CPT | Performed by: STUDENT IN AN ORGANIZED HEALTH CARE EDUCATION/TRAINING PROGRAM

## 2025-05-21 PROCEDURE — 85025 COMPLETE CBC W/AUTO DIFF WBC: CPT | Performed by: STUDENT IN AN ORGANIZED HEALTH CARE EDUCATION/TRAINING PROGRAM

## 2025-05-21 PROCEDURE — 36430 TRANSFUSION BLD/BLD COMPNT: CPT

## 2025-05-21 PROCEDURE — 99232 SBSQ HOSP IP/OBS MODERATE 35: CPT | Performed by: NURSE PRACTITIONER

## 2025-05-21 PROCEDURE — 80048 BASIC METABOLIC PNL TOTAL CA: CPT | Performed by: STUDENT IN AN ORGANIZED HEALTH CARE EDUCATION/TRAINING PROGRAM

## 2025-05-21 RX ORDER — ATORVASTATIN CALCIUM 20 MG/1
10 TABLET, FILM COATED ORAL NIGHTLY
Status: DISCONTINUED | OUTPATIENT
Start: 2025-05-21 | End: 2025-05-22 | Stop reason: HOSPADM

## 2025-05-21 RX ORDER — FAMOTIDINE 20 MG/1
10 TABLET, FILM COATED ORAL ONCE
Status: COMPLETED | OUTPATIENT
Start: 2025-05-21 | End: 2025-05-21

## 2025-05-21 RX ADMIN — INSULIN LISPRO 3 UNITS: 100 INJECTION, SOLUTION INTRAVENOUS; SUBCUTANEOUS at 12:04

## 2025-05-21 RX ADMIN — ATORVASTATIN CALCIUM 10 MG: 20 TABLET, FILM COATED ORAL at 21:25

## 2025-05-21 RX ADMIN — APIXABAN 5 MG: 5 TABLET, FILM COATED ORAL at 08:46

## 2025-05-21 RX ADMIN — FAMOTIDINE 10 MG: 20 TABLET, FILM COATED ORAL at 15:06

## 2025-05-21 RX ADMIN — FUROSEMIDE 20 MG: 40 TABLET ORAL at 08:45

## 2025-05-21 RX ADMIN — FOLIC ACID 1000 MCG: 1 TABLET ORAL at 08:45

## 2025-05-21 RX ADMIN — METFORMIN HYDROCHLORIDE 500 MG: 1000 TABLET ORAL at 17:00

## 2025-05-21 RX ADMIN — CARVEDILOL 6.25 MG: 6.25 TABLET, FILM COATED ORAL at 17:00

## 2025-05-21 RX ADMIN — Medication 1000 MCG: at 08:45

## 2025-05-21 RX ADMIN — PANTOPRAZOLE SODIUM 40 MG: 40 TABLET, DELAYED RELEASE ORAL at 06:20

## 2025-05-21 RX ADMIN — INSULIN LISPRO 2 UNITS: 100 INJECTION, SOLUTION INTRAVENOUS; SUBCUTANEOUS at 17:00

## 2025-05-21 RX ADMIN — OXYCODONE HYDROCHLORIDE AND ACETAMINOPHEN 1000 MG: 500 TABLET ORAL at 08:45

## 2025-05-21 RX ADMIN — CARVEDILOL 6.25 MG: 6.25 TABLET, FILM COATED ORAL at 08:45

## 2025-05-21 NOTE — CASE MANAGEMENT/SOCIAL WORK
Continued Stay Note  [facility]     Patient Name: Sacha Whitehead  MRN: [id number]  Today's Date: 5/21/2025    Admit Date: 5/13/2025    Plan: Plan Robert Lui- pre cert obtained.   ALISHA Godoy RN   Discharge Plan       Row Name 05/21/25 1020       Plan    Plan Plan Robert Lui- pre cert obtained.   ALISHA Godoy RN    Patient/Family in Agreement with Plan yes    Plan Comments Per  Post Acute pre cert has been obtained.  Per Lucy ( [phone]) pt has a bed and can be accepted at [place].  Plan Robert Lui- pre cert obtained. ALISHA Godoy RN                   Discharge Codes    No documentation.                 Expected Discharge Date and Time       Expected Discharge Date Expected Discharge Time    May 21, 2025               Elinor Godoy RN

## 2025-05-21 NOTE — PLAN OF CARE
Goal Outcome Evaluation:  Patient alert and oriented. CHOLO RUBI. Multiple runs of Vtach noted- Electrophysiologist  consulted and assessed patient. 1 unit of PRBCs given. Medicated per MAR.  Eliquis d/c per  order. Plan of care ongoing.

## 2025-05-21 NOTE — PROGRESS NOTES
[facility] Medicine Services  PROGRESS NOTE    Patient Name: Sacha Whitehead  : 1930  MRN: [id number]    Date of Admission: 2025  Primary Care Physician: Alvina Hauser MD    Subjective   Subjective     CC:  Follow-up for recent anemia    Subjective: Patient feels generally weak today.  He denies any other new complaints.  He is eager to get to rehab when possible.  He denies any bleeding.  He is a little hard of hearing and is somewhat poor historian.    Review of Systems  As above    Objective   Objective     Vital Signs:   Temp:  [97.5 °F (36.4 °C)-98.2 °F (36.8 °C)] 98.2 °F (36.8 °C)  Heart Rate:  [72-76] 72  Resp:  [16-18] 16  BP: (104-128)/(55-89) 110/89        Physical Exam:  Constitutional: Awake, alert, elderly appearing  HENT: NCAT, mucous membranes moist, neck supple  Respiratory: No cough or wheezes, normal respirations, nonlabored breathing   Cardiovascular: Pulse rate is normal, palpable radial pulses  Gastrointestinal:  Soft, nontender, nondistended  Musculoskeletal: BMI is 31, frail and debilitated in appearance  Psychiatric: Appropriate affect, cooperative, conversational  Neurologic: No slurred speech or facial droop, follows commands  Skin: No rashes or jaundice, warm      Results Reviewed:  Results from last 7 days   Lab Units 25  0525  0534 25  2209 25  0611 25  0616 25  0402 25  0410   WBC 10*3/mm3 5.89 6.15  --  6.13 6.10 5.54 5.29   HEMOGLOBIN g/dL 7.2* 7.5* 7.3* 7.1* 7.2* 7.5* 7.3*   HEMATOCRIT % 23.2* 24.4* 23.3* 23.2* 22.3* 25.1* 24.7*   PLATELETS 10*3/mm3 150 153  --  149 155 156 136*   INR   --   --   --   --  3.73* 3.57* 2.58*     Results from last 7 days   Lab Units 25  0519 25  0534 25  0611   SODIUM mmol/L 141 142 142   POTASSIUM mmol/L 4.2 4.3 4.4   CHLORIDE mmol/L 105 107 106   CO2 mmol/L 30.2* 28.0 29.6*   BUN mg/dL 29* 27* 27*   CREATININE mg/dL 0.88 0.94 1.00   GLUCOSE mg/dL 117* 125* 119*    CALCIUM mg/dL 9.3 9.2 9.7*     Estimated Creatinine Clearance: 58.9 mL/min (by C-G formula based on SCr of 0.88 mg/dL).    Microbiology Results Abnormal       None            Imaging Results (Last 24 Hours)       ** No results found for the last 24 hours. **            Results for orders placed during the hospital encounter of 01/11/18    Adult Transthoracic Echo Complete W/ Cont if Necessary Per Protocol    Interpretation Summary  · Left ventricular wall thickness is consistent with mild concentric hypertrophy.  · Left ventricular systolic function is low normal. Estimated EF = 50%.  · Right ventricular cavity is moderately dilated.  · Left atrial cavity size is moderately dilated.  · Mild to moderate aortic valve regurgitation is present.  · Trace tricuspid valve regurgitation is present.  · Calculated right ventricular systolic pressure from tricuspid regurgitation is 44.8 mmHg.      I have reviewed the medications:  Scheduled Meds:ascorbic acid, 1,000 mg, Oral, Daily  carvedilol, 6.25 mg, Oral, BID With Meals  famotidine, 10 mg, Oral, Once  folic acid, 1,000 mcg, Oral, Daily  furosemide, 20 mg, Oral, Daily  insulin lispro, 2-7 Units, Subcutaneous, 4x Daily AC & at Bedtime  pantoprazole, 40 mg, Oral, QAM AC  vitamin B-12, 1,000 mcg, Oral, Daily      Continuous Infusions:   PRN Meds:.  acetaminophen    senna-docusate sodium **AND** polyethylene glycol **AND** bisacodyl **AND** bisacodyl    ondansetron ODT **OR** ondansetron    [COMPLETED] Insert Peripheral IV **AND** sodium chloride    Assessment & Plan   Assessment & Plan     Active Hospital Problems    Diagnosis  POA    **Symptomatic anemia [D64.9]  Yes    Type 2 diabetes mellitus with hyperglycemia [E11.65]  Yes    Supratherapeutic INR [R79.1]  Yes    Warfarin anticoagulation [Z79.01]  Not Applicable    Sick sinus syndrome [I49.5]  Yes    Essential hypertension [I10]  Yes    Atrial fibrillation [I48.91]  Yes      Resolved Hospital Problems   No resolved  problems to display.        Brief Hospital Course to date:  Sacha Whitehead is a 89 y.o. male admitted with Symptomatic anemia in the setting of Hemoccult positive stool concerning for blood loss anemia with warfarin toxicity.    Discussion/plan for today:  All medical problems are new under my management today.  Case discussed with cardiology and I requested they reevaluate today.  Patient's hemoglobin continues to trend down on the Eliquis.  It seems now that the risks of anticoagulation perhaps are outweighing the benefit.  Cardiology had a risk versus benefit discussion with family and agree that at this point we need to hold Eliquis.  Plan to transfuse 1 more unit today.  Hopefully if hemoglobin is stable and patient looks well tomorrow he can transfer off of anticoagulation.  Treatment plan discussed with patient who is in agreement.     Symptomatic anemia/probable GI bleed, iron deficiency anemia and acute blood loss anemia  - Has received multiple, last transfusion on 5/21, trend hemoglobin  -GI consulted-conservative management   -PPI daily  -3d IV iron completed     Afib  Supratherapeutic INR,  warfarin toxicity, resolved  S/p PPM  -RC: Coreg  -AC: wafarin switched to Eliquis per Cardiology and now anticoagulation discontinued due to risk versus benefit assessment  -INR > 10 OA, s/p vitamin K and KCentra in ED     LE edema  - Lasix 20 mg daily, was on 10 mg at home prior to admission     Elevated troponin  -Trop 85 > 61; EKG no acute ischemia  -Likely demand related to severe anemia, not consistent with ACS  -Cardiology eval'd- no acute ischemic eval indicated     DM2 w/ hyperglycemia  -Initially held home medication, restart low-dose metformin  -SSI; monitor for hypoglycemia    DVT Prophylaxis: Contraindicated      Disposition: SNF 1 to 2 days pending    CODE STATUS:   Code Status and Medical Interventions: No CPR (Do Not Attempt to Resuscitate); Limited Support; NO intubation (DNI)   Ordered at: 05/13/25  1325     Code Status (Patient has no pulse and is not breathing):    No CPR (Do Not Attempt to Resuscitate)     Medical Interventions (Patient has pulse or is breathing):    Limited Support     Medical Intervention Limits:    NO intubation (DNI)       Guy Schaeffer MD  05/21/25

## 2025-05-21 NOTE — PLAN OF CARE
Goal Outcome Evaluation:  Plan of Care Reviewed With: patient        Progress: improving  Outcome Evaluation: c/o tenderness in left foot, no request for pain med, up to BSC large BM, black tarry, VSS, afib on the monitor with a controlled rate, one episode 21 beat V tach, asymptomatic, Eyes closed at long interval, resting quietly in his room

## 2025-05-21 NOTE — CONSULTS
Electrophysiology Hospital Consult            Patient Name: Sacha Whitehead  Age/Sex: 89 y.o. male  : 1930  MRN: [id number]    Date of Admission: 2025  Date of Encounter Visit: 25  Encounter Provider: John Aguilera MD  Referring Provider: No ref. provider found  Place of Service: [facility] CARDIOLOGY  Patient Care Team:  Alvina Hauser MD as PCP - General (Geriatric Medicine)  oJhn Chavarria MD as Referring Physician (Internal Medicine)  John Medrano MD as Consulting Physician (Hematology and Oncology)  Jhonny Yang MD as Consulting Physician (Urology)  Farrukh Cutler PharmD as Pharmacist (Pharmacy)  Beatriz Snyder RPH as Pharmacist (Pharmacy)      Subjective:   EP Consultation for: ICD at [facility]    Chief Complaint: No specific complaint    History of Present Illness:  Sacha Whitehead is a 89 y.o. male admitted for anemia blood transfusion and supratherapeutic INR.  The patient is hard of hearing but is in no apparent distress at this moment.  He did tell me about his living situation and he has help from his son and daughters.  He is very limited in his mobility but does get around some.    He has a longstanding dual-chamber ICD.  Dr. Marcial did his last generator change in 2018.  He now has a preserved ejection fraction.  I am not sure why the device was placed in the beginning.    The device has reached [facility].  He had sinus node dysfunction and paced in the atrium.  Then he went into A-fib and paces in the RV.  I decreased his rate to 50 and he paces a lot less.  He is clearly not pacer dependent.    We are asked to weigh in on the ICD and its management at [facility].  Past Medical History:  Past Medical History:   Diagnosis Date    Anemia     Aneurysm of abdominal aorta     Dr. Red    Aneurysm of aortic root     Dr. Red    Angiectasia     Bleeding in stomach; EGD 2014    Aortic valve insufficiency     Arthritis     Osteoarthritis    Atrial fibrillation      Permanent, per Dr. Red; on long-term Coumadin anticoagulation since around 2008.    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    Enlarged prostate without lower urinary tract symptoms (luts)     Esophageal reflux     Facial droop     Right sided    Fatigue     Gastrointestinal hemorrhage 3/10/2016    Description: with angioectasia stomach 2013    Gout     H/O concentric left ventricular hypertrophy (LVH)     LVEF 50% documented on 06/14/2013 by echocardiogram.     Hx of being hospitalized     On 12/11/2013 with hemoglobin of 6 and heme-positive stool. EGD showed a telangiectasia which was lasered by Dr. Carolina Servin.     Hx of being hospitalized     On 04/04/2014 for surgical resection of bleeding at the anastomotic site in colon. Had surgical re-resection and discharged home.    Hx of being hospitalized     On 05/19/2014 with severe anemia.     Hx of being hospitalized     On 12/15/2014 with further resection of sites of possible blood loss based on angiography of the abdominal masses.     Hx of being hospitalized     At [facility] with a grand mal seizure and started on Keppra in 03/2015    Hypertension     Liver enzyme elevation 2/8/2019    Melanoma 8/28/2017    dx'd 2017     Memory loss     Microalbuminuria     Mitral regurgitation     Obstructive hypertrophic cardiomyopathy     Per Dr. Red    HUMAIRA on CPAP     Partial epilepsy with impairment of consciousness 5/13/2015    Overview:  2015 IMO UPDATE  Overview:  2015 IMO UPDATE    Peptic ulcer     On EGD 2014    PND (paroxysmal nocturnal dyspnea)     Reading comprehension disorder     Renal artery stenosis     Seizures     [facility]    Sick sinus  syndrome     With hx non-sustained Vtach s/p pacer and AICD    Stroke     Occipital stroke with vision loss in 07/2014.    Transient cerebral ischemia     Embolic    Tubular adenoma of colon     Transverse colon, s/p resection (surgical), cause of severe FE deficiency anemia.    Ventricular tachycardia     Vitamin D deficiency        Past Surgical History:   Procedure Laterality Date    CARDIAC DEFIBRILLATOR PLACEMENT      CARDIAC ELECTROPHYSIOLOGY PROCEDURE N/A 10/28/2016    Procedure: ICD battery change;  Surgeon: Nael Marcial MD;  Location:  [facility];  Service:     CARDIAC PACEMAKER PLACEMENT      Pacemaker Permanent Placement    COLON SURGERY      Laparoscopy Partial Colectomy - Right, dx of tubulovillous adenoma    ENDOSCOPY N/A 8/29/2017    Procedure: ESOPHAGOGASTRODUODENOSCOPY WITH FOREIGN BODY REMOVAL;  Surgeon: Lorena Spencer MD;  Location: [facility] ENDOSCOPY;  Service:     ENDOSCOPY AND COLONOSCOPY      Showing tumor in the transverse colon.     ENDOSCOPY AND COLONOSCOPY  07/02/2014    EGD negative; colonoscopy with an anastomotic ulcer that was over sewn and cauterized.     EYE SURGERY      TOTAL KNEE ARTHROPLASTY Bilateral 2006       Home Medications:   Medications Prior to Admission   Medication Sig Dispense Refill Last Dose/Taking    acetaminophen (TYLENOL) 500 MG tablet Take 2 tablets by mouth 2 (Two) Times a Day.   5/13/2025    ascorbic acid (VITAMIN C) 1000 MG tablet Take 1 tablet by mouth Daily.   5/13/2025    atorvastatin (LIPITOR) 20 MG tablet TAKE 1 TABLET BY MOUTH DAILY *NEEDS APPOINTMENT FOR FURTHER REFILLS (Patient taking differently: Take 1 tablet by mouth Daily.) 30 tablet 0 5/12/2025    carvedilol (COREG) 25 MG tablet TAKE 1 TABLET BY MOUTH 2 TIMES A DAY WITH A MEAL *NEEDS TO BE SEEN FOR REFILLS (Patient taking differently: Take 1 tablet by mouth 2 (Two) Times a Day With Meals.) 60 tablet 0 5/13/2025    folic acid (FOLVITE) 1 MG tablet Take 1 tablet by mouth Daily. PT  NEEDS APPT FOR FURTHER REFILLS (Patient taking differently: Take 1 tablet by mouth Daily.) 90 tablet 0 2025    furosemide (LASIX) 20 MG tablet TAKE 1/2 TABLET EVERY DAY (Patient taking differently: Take 0.5 tablets by mouth Daily.) 45 tablet 3 2025    metFORMIN (GLUCOPHAGE) 500 MG tablet TAKE 2 TABLETS BY MOUTH EVERY MORNING AND TAKE ONE TABLET BY MOUTH EVERY EVENING (Patient taking differently: Take 1 tablet by mouth 2 (Two) Times a Day With Meals. TAKE ONE TABLET BY MOUTH EVERY MORNING AND TAKE ONE TABLET BY MOUTH EVERY EVENING) 90 tablet 0 2025    vitamin B-12 (CYANOCOBALAMIN) 1000 MCG tablet Take 1 tablet by mouth Daily.   2025    warfarin (COUMADIN) 5 MG tablet TAKE ONE-HALF TABLET (2.5 MG) BY MOUTH ON  AND TAKE 1 TABLET (5 MG) EVERY DAY OR AS DIRECTED (Patient taking differently: Take  by mouth See Admin Instructions. TAKE ONE-HALF TABLET (2.5 MG) BY MOUTH ON  AND TAKE 1 TABLET (5 MG) EVERY DAY OR AS DIRECTED) 90 tablet 3 2025       Allergies:  No Known Allergies    Past Social History:  Social History     Socioeconomic History    Marital status:      Spouse name: Leslie    Number of children: 4    Years of education: College   Tobacco Use    Smoking status: Former     Current packs/day: 0.00     Average packs/day: 1 pack/day for 10.0 years (10.0 ttl pk-yrs)     Types: Cigarettes     Quit date:      Years since quittin.4    Smokeless tobacco: Never    Tobacco comments:     40 YRS AGO   Vaping Use    Vaping status: Never Used   Substance and Sexual Activity    Alcohol use: Yes     Comment: 1 drink a year//Caffeine use: None    Drug use: No    Sexual activity: Not Currently       Past Family History:  Family History   Problem Relation Age of Onset    Diabetes Father         Type 2 Diabetes Mellitus    Breast cancer Sister 59    Lung cancer Sister     Diabetes Brother         Type 2 Diabetes Mellitus    Heart disease Brother     Heart disease Other      Stroke Other         Stroke Syndrome    No Known Problems Son     No Known Problems Daughter     Heart disease Mother        Review of Systems: All systems reviewed. Pertinent positives identified in HPI. All other systems are negative.     14 point ROS was performed and is negative except as outlined in HPI.     Objective:     Objective:  Vital Signs (last 24 hours)         05/20 0700  05/21 0659 05/21 0700  05/21 1547   Most Recent      Temp (°F) 97.3 -  97.5    97.2 -  98.2     97.2 (36.2) 05/21 1514    Heart Rate 66 -  76    72 -  95     90 05/21 1514    Resp   18    16 -  17     17 05/21 1514    /55 -  128/58    110/89 -  130/60     130/60 05/21 1514    SpO2 (%) 93 -  96    95 -  96     96 05/21 1514          Temp:  [97.2 °F (36.2 °C)-98.2 °F (36.8 °C)] 97.2 °F (36.2 °C)  Heart Rate:  [72-95] 90  Resp:  [16-18] 17  BP: (104-130)/(55-89) 130/60  Body mass index is 31.03 kg/m².        Physical Exam:   Eyes:      General:         Right eye: No discharge.         Left eye: No discharge.   HENT:      Head: Normocephalic and atraumatic.   Neck:      Thyroid: No thyromegaly.      Vascular: No JVD.   Pulmonary:      Effort: Pulmonary effort is normal.      Breath sounds: Normal breath sounds. No rales.   Cardiovascular:      Normal rate. Irregularly irregular rhythm.      No gallop.    Edema:     Peripheral edema absent.   Abdominal:      General: Bowel sounds are normal.      Palpations: Abdomen is soft.      Tenderness: There is no abdominal tenderness.   Musculoskeletal: Normal range of motion.         General: No deformity. Skin:     General: Skin is warm and dry.      Findings: Erythema present.      Comments: Numerous areas of skin breakdown   Neurological:      Mental Status: Alert and oriented to person, place, and time.      Motor: Normal muscle tone.   Psychiatric:         Behavior: Behavior normal.         Thought Content: Thought content normal.          Labs:   Lab Review:     Results from last 7  days   Lab Units 05/21/25  0519 05/20/25  0534 05/19/25  0611 05/18/25  0616 05/17/25  0402 05/16/25  0410 05/15/25  0545   SODIUM mmol/L 141 142 142 140 143 142 146*   POTASSIUM mmol/L 4.2 4.3 4.4 4.1 4.4 3.8 4.4   CHLORIDE mmol/L 105 107 106 103 109* 108* 115*   CO2 mmol/L 30.2* 28.0 29.6* 28.0 28.0 24.0 22.1   BUN mg/dL 29* 27* 27* 29* 28* 26* 29*   CREATININE mg/dL 0.88 0.94 1.00 1.15 1.06 1.10 0.99   GLUCOSE mg/dL 117* 125* 119* 113* 119* 145* 107*   CALCIUM mg/dL 9.3 9.2 9.7* 9.6 9.0 8.8 8.9         Results from last 7 days   Lab Units 05/21/25  0519   WBC 10*3/mm3 5.89   HEMOGLOBIN g/dL 7.2*   HEMATOCRIT % 23.2*   PLATELETS 10*3/mm3 150     Results from last 7 days   Lab Units 05/18/25  0616 05/17/25  0402 05/16/25  0410   INR  3.73* 3.57* 2.58*                                   Imaging:     Imaging Results (Most Recent)       Procedure Component Value Units Date/Time    XR Chest 1 View [[id number]] Collected: 05/13/25 1136     Updated: 05/13/25 1139    Narrative:      XR CHEST 1 VW-5/13/2025     HISTORY: Fatigue.     Heart size is mildly enlarged. Lungs appear free of acute infiltrates.  Cardiac pacemaker is seen in good position. There is mild thoracic  scoliosis. There is some aortic calcification.       Impression:      1. Cardiomegaly.  2. No acute infiltrates.        This report was finalized on 5/13/2025 11:36 AM by Dr. Fredrick Bennett M.D on Workstation: [id number]                 EKG: Atrial fibrillation, nonspecific ST-T changes, PVCs      I personally viewed and interpreted the patient's EKG/Telemetry data.    Assessment:       Symptomatic anemia    Atrial fibrillation    Essential hypertension    Sick sinus syndrome    Warfarin anticoagulation    Supratherapeutic INR    Type 2 diabetes mellitus with hyperglycemia        Plan:     His dual-chamber ICD is at [facility].  I spoke with his son today.    Someone has spoke to him about shock deactivation and his son was very much in favor of it and so on my  as he is 89 years old and has multiple comorbidities.    The slightly more complex issue is what to do with the generator at [facility].  He has A-fib and is not pacer dependent however he does use the device somewhat.    I think the solution to that is to programmed his rate down to VVI at 40 and then also stop or substantially reduce his carvedilol dose.  If we do not give him carvedilol then his heart rate will be higher and he will pace even less.    I would want to avoid generator change in his 89-year-old gentleman because of the asymmetric wrist of ICD pocket infection which would be at catastrophe.    The obvious best choice is to not operate on this gentleman.    I do not think he gets any benefit from carvedilol because a, there are no [id number]-bujb with chronic comorbidity in the beta-blocker trials and b) meta analyses of the heart failure trials show that beta-blocker benefit is exclusively in patients in sinus rhythm.    He did have a run of nonsustained ventricular tachycardia today that was asymptomatic.  I do not think this is unusual or unexpected and I do not think it changes the decision calculus.    Thank you for allowing me to participate in the care of Sacha Whitehead. Feel free to contact me directly with any further questions or concerns.    John Aguilera MD  [facility]  05/21/25  15:47 EDT

## 2025-05-21 NOTE — PROGRESS NOTES
"    Patient Name: Sacha Whitehead  :1930  89 y.o.      Patient Care Team:  Alvina Hauser MD as PCP - General (Geriatric Medicine)  John Chavarria MD as Referring Physician (Internal Medicine)  John Medrano MD as Consulting Physician (Hematology and Oncology)  Jhonny Yang MD as Consulting Physician (Urology)  Farrukh Cutler, PharmD as Pharmacist (Pharmacy)  Beatriz Snyder RPH as Pharmacist (Pharmacy)    Chief Complaint: follow up anemia     Interval History: he presented on  with critically low Hgb of 5.3 . INR>10. Coagulopathy corrected. Transfused three units. Heme positive stool. Scopes deferred in favor of conservative management. Apixaban was started 5/15 5 mg BID).     CHADs2 Vasc 6. (9.8 % / yr)  Has - BLEd 5 (12.5 % / yr)           Objective   Vital Signs  Temp:  [97.5 °F (36.4 °C)-98.2 °F (36.8 °C)] 98.2 °F (36.8 °C)  Heart Rate:  [72-76] 72  Resp:  [16-18] 16  BP: (104-128)/(55-89) 110/89    Intake/Output Summary (Last 24 hours) at 2025 1148  Last data filed at 2025 0542  Gross per 24 hour   Intake --   Output 950 ml   Net -950 ml     Flowsheet Rows      Flowsheet Row First Filed Value   Admission Height 177.8 cm (70\") Documented at 2025 1030   Admission Weight 98.7 kg (217 lb 9.5 oz) Documented at 2025 1030            Physical Exam:   General Appearance:    Alert, cooperative, in no acute distress   Lungs:     Clear to auscultation.  Normal respiratory effort and rate.      Heart:    irregular rhythm and normal rate, normal S1 and S2, no murmurs, gallops or rubs.     Chest Wall:    No abnormalities observed   Abdomen:     Soft, nontender, positive bowel sounds.     Extremities:   no cyanosis, clubbing or edema.  No marked joint deformities.  Adequate musculoskeletal strength.       Results Review:    Results from last 7 days   Lab Units 25  0519   SODIUM mmol/L 141   POTASSIUM mmol/L 4.2   CHLORIDE mmol/L 105   CO2 mmol/L 30.2*   BUN mg/dL 29* "   CREATININE mg/dL 0.88   GLUCOSE mg/dL 117*   CALCIUM mg/dL 9.3         Results from last 7 days   Lab Units 05/21/25  0519   WBC 10*3/mm3 5.89   HEMOGLOBIN g/dL 7.2*   HEMATOCRIT % 23.2*   PLATELETS 10*3/mm3 150     Results from last 7 days   Lab Units 05/18/25  0616 05/17/25  0402 05/16/25  0410   INR  3.73* 3.57* 2.58*                       Medication Review:   apixaban, 5 mg, Oral, Q12H  ascorbic acid, 1,000 mg, Oral, Daily  carvedilol, 6.25 mg, Oral, BID With Meals  folic acid, 1,000 mcg, Oral, Daily  furosemide, 20 mg, Oral, Daily  insulin lispro, 2-7 Units, Subcutaneous, 4x Daily AC & at Bedtime  pantoprazole, 40 mg, Oral, QAM AC  vitamin B-12, 1,000 mcg, Oral, Daily              Assessment & Plan      Troponin elevation: Consistent with nonischemic myocardial injury secondary to significant anemia.  This is not ACS.  Patient has no chest pain.  EKG unchanged.  No further cardiac workup indicated.  Severe acute anemia, suspected acute blood loss, likely secondary to GI losses.  In setting of supratherapeutic INR.:  GI following and deferred scopes in favor of conservative management. Status post 3 PRBC transfusions.  Coagulopathy was reversed and he was started on apixaban 5/15/25.   Atrial fibrillation, permanent  Warfarin was stopped in the past due to recurrent GI bleeding. Then restarted and was tolerating well until now.   Sick sinus syndrome with intermittent ventricular pacing: His device is near [facility], continue outpatient follow-up. EP to chart review.   Hypertrophic cardiomyopathy   NSVT , ICD in place. Consider down grading to pacer.  Obstructive sleep apnea ,untreated      This is a difficult situation. He is a 89 year old that lives independently  - and really his only goal is to be able to stay this way with enough strength to get up and go to the bathroom.     I had a thorough discussion with his son Vincent and offered both options of stopping anticoagulation completely vs off label use of 2.5 mg  BID. Risk of bleeding is slightly higher than risk of stroke but both are significant risks.     At this point, we both agreed the anemia is having a greater impact on his health and quality of life and decided to stop anticoagulation. We will see how he does at rehab and can reconsider as an outpatient.     His device went in to [facility] a few months ago. Will ask EP to review. I did start the discussion with his son Vincent - to consider turning off the defibrillator . Further discussion per EP.     Discussed with his primary cardiologist Dr. Red as well as Dr. Schaeffer.       Mara Garza, APRMIRA  [facility]  05/21/25  11:48 EDT

## 2025-05-21 NOTE — CASE MANAGEMENT/SOCIAL WORK
Post-Acute Authorization Submission      Post Acute Pre-Cert Documentation  Request Submitted by Facility - Type:: Hospital  Post-Acute Authorization Type Submitted:: SNF  Date Post Acute Pre-Cert Inititated per Facility: 05/19/25  Date Post Acute Pre-Cert Completed: 05/21/25  Accepting Facility: [facility] Discharge Date Requested: 05/21/25  All Clinicals Submitted?: Yes  Had Accepting Facility at Time of Submission: Yes  Response Received from Insurance?: Approval  Response Communicated to:: , Accepting Facility Liaison, Accepting Facility Auth Department  Authorization Number:: [id number]  Post Acute Pre-Cert Initiated Comment: VALID TO ADMIT [facility]PTO 5/25/25              TULIO Chi

## 2025-05-22 VITALS
HEART RATE: 80 BPM | SYSTOLIC BLOOD PRESSURE: 138 MMHG | HEIGHT: 70 IN | DIASTOLIC BLOOD PRESSURE: 79 MMHG | OXYGEN SATURATION: 90 % | TEMPERATURE: 98.3 F | RESPIRATION RATE: 16 BRPM | WEIGHT: 216.71 LBS | BODY MASS INDEX: 31.03 KG/M2

## 2025-05-22 PROBLEM — T45.511A: Status: ACTIVE | Noted: 2025-05-22

## 2025-05-22 PROBLEM — D62 ACUTE BLOOD LOSS ANEMIA: Status: ACTIVE | Noted: 2025-05-22

## 2025-05-22 PROBLEM — T45.511A: Status: RESOLVED | Noted: 2025-05-22 | Resolved: 2025-05-22

## 2025-05-22 LAB
ANION GAP SERPL CALCULATED.3IONS-SCNC: 8 MMOL/L (ref 5–15)
BASOPHILS # BLD AUTO: 0.02 10*3/MM3 (ref 0–0.2)
BASOPHILS NFR BLD AUTO: 0.4 % (ref 0–1.5)
BH BB BLOOD EXPIRATION DATE: NORMAL
BH BB BLOOD TYPE BARCODE: 6200
BH BB DISPENSE STATUS: NORMAL
BH BB PRODUCT CODE: NORMAL
BH BB UNIT NUMBER: NORMAL
BUN SERPL-MCNC: 27 MG/DL (ref 8–23)
BUN/CREAT SERPL: 34.6 (ref 7–25)
CALCIUM SPEC-SCNC: 8.9 MG/DL (ref 8.2–9.6)
CHLORIDE SERPL-SCNC: 103 MMOL/L (ref 98–107)
CO2 SERPL-SCNC: 27 MMOL/L (ref 22–29)
CREAT SERPL-MCNC: 0.78 MG/DL (ref 0.76–1.27)
CROSSMATCH INTERPRETATION: NORMAL
DEPRECATED RDW RBC AUTO: 60.4 FL (ref 37–54)
EGFRCR SERPLBLD CKD-EPI 2021: 82.1 ML/MIN/1.73
EOSINOPHIL # BLD AUTO: 0.16 10*3/MM3 (ref 0–0.4)
EOSINOPHIL NFR BLD AUTO: 3.2 % (ref 0.3–6.2)
ERYTHROCYTE [DISTWIDTH] IN BLOOD BY AUTOMATED COUNT: 18 % (ref 12.3–15.4)
GLUCOSE BLDC GLUCOMTR-MCNC: 133 MG/DL (ref 70–130)
GLUCOSE BLDC GLUCOMTR-MCNC: 234 MG/DL (ref 70–130)
GLUCOSE SERPL-MCNC: 109 MG/DL (ref 65–99)
HCT VFR BLD AUTO: 24.4 % (ref 37.5–51)
HGB BLD-MCNC: 7.7 G/DL (ref 13–17.7)
IMM GRANULOCYTES # BLD AUTO: 0.04 10*3/MM3 (ref 0–0.05)
IMM GRANULOCYTES NFR BLD AUTO: 0.8 % (ref 0–0.5)
LYMPHOCYTES # BLD AUTO: 0.88 10*3/MM3 (ref 0.7–3.1)
LYMPHOCYTES NFR BLD AUTO: 17.4 % (ref 19.6–45.3)
MCH RBC QN AUTO: 30.1 PG (ref 26.6–33)
MCHC RBC AUTO-ENTMCNC: 31.6 G/DL (ref 31.5–35.7)
MCV RBC AUTO: 95.3 FL (ref 79–97)
MONOCYTES # BLD AUTO: 0.77 10*3/MM3 (ref 0.1–0.9)
MONOCYTES NFR BLD AUTO: 15.2 % (ref 5–12)
NEUTROPHILS NFR BLD AUTO: 3.2 10*3/MM3 (ref 1.7–7)
NEUTROPHILS NFR BLD AUTO: 63 % (ref 42.7–76)
NRBC BLD AUTO-RTO: 0 /100 WBC (ref 0–0.2)
PLATELET # BLD AUTO: 152 10*3/MM3 (ref 140–450)
PMV BLD AUTO: 10.6 FL (ref 6–12)
POTASSIUM SERPL-SCNC: 4.4 MMOL/L (ref 3.5–5.2)
RBC # BLD AUTO: 2.56 10*6/MM3 (ref 4.14–5.8)
SODIUM SERPL-SCNC: 138 MMOL/L (ref 136–145)
UNIT  ABO: NORMAL
UNIT  RH: NORMAL
WBC NRBC COR # BLD AUTO: 5.07 10*3/MM3 (ref 3.4–10.8)

## 2025-05-22 PROCEDURE — 82948 REAGENT STRIP/BLOOD GLUCOSE: CPT

## 2025-05-22 PROCEDURE — 85025 COMPLETE CBC W/AUTO DIFF WBC: CPT | Performed by: STUDENT IN AN ORGANIZED HEALTH CARE EDUCATION/TRAINING PROGRAM

## 2025-05-22 PROCEDURE — 80048 BASIC METABOLIC PNL TOTAL CA: CPT | Performed by: STUDENT IN AN ORGANIZED HEALTH CARE EDUCATION/TRAINING PROGRAM

## 2025-05-22 PROCEDURE — 36415 COLL VENOUS BLD VENIPUNCTURE: CPT | Performed by: STUDENT IN AN ORGANIZED HEALTH CARE EDUCATION/TRAINING PROGRAM

## 2025-05-22 PROCEDURE — 99232 SBSQ HOSP IP/OBS MODERATE 35: CPT | Performed by: NURSE PRACTITIONER

## 2025-05-22 RX ORDER — FOLIC ACID 1 MG/1
1000 TABLET ORAL DAILY
Qty: 30 TABLET | Refills: 0 | Status: SHIPPED | OUTPATIENT
Start: 2025-05-23

## 2025-05-22 RX ORDER — AMOXICILLIN 250 MG
2 CAPSULE ORAL 2 TIMES DAILY PRN
Start: 2025-05-22

## 2025-05-22 RX ORDER — CARVEDILOL 6.25 MG/1
6.25 TABLET ORAL 2 TIMES DAILY WITH MEALS
Qty: 60 TABLET | Refills: 0 | Status: SHIPPED | OUTPATIENT
Start: 2025-05-22

## 2025-05-22 RX ORDER — FUROSEMIDE 20 MG/1
20 TABLET ORAL DAILY
Qty: 30 TABLET | Refills: 0 | Status: SHIPPED | OUTPATIENT
Start: 2025-05-23

## 2025-05-22 RX ORDER — ATORVASTATIN CALCIUM 10 MG/1
10 TABLET, FILM COATED ORAL NIGHTLY
Qty: 30 TABLET | Refills: 0 | Status: SHIPPED | OUTPATIENT
Start: 2025-05-22

## 2025-05-22 RX ORDER — PANTOPRAZOLE SODIUM 40 MG/1
40 TABLET, DELAYED RELEASE ORAL
Qty: 30 TABLET | Refills: 0 | Status: SHIPPED | OUTPATIENT
Start: 2025-05-23

## 2025-05-22 RX ADMIN — METFORMIN HYDROCHLORIDE 500 MG: 1000 TABLET ORAL at 08:47

## 2025-05-22 RX ADMIN — FUROSEMIDE 20 MG: 40 TABLET ORAL at 08:48

## 2025-05-22 RX ADMIN — CARVEDILOL 6.25 MG: 6.25 TABLET, FILM COATED ORAL at 08:48

## 2025-05-22 RX ADMIN — Medication 1000 MCG: at 08:48

## 2025-05-22 RX ADMIN — OXYCODONE HYDROCHLORIDE AND ACETAMINOPHEN 1000 MG: 500 TABLET ORAL at 08:48

## 2025-05-22 RX ADMIN — FOLIC ACID 1000 MCG: 1 TABLET ORAL at 08:48

## 2025-05-22 RX ADMIN — PANTOPRAZOLE SODIUM 40 MG: 40 TABLET, DELAYED RELEASE ORAL at 06:54

## 2025-05-22 NOTE — CASE MANAGEMENT/SOCIAL WORK
Continued Stay Note  [facility]     Patient Name: Sacha Whitehead  MRN: [id number]  Today's Date: 5/22/2025    Admit Date: 5/13/2025    Plan: Plan [facility] for skilled care- pre cert obtained.  FATIMAH Saba   Discharge Plan       Row Name 05/22/25 1015       Plan    Plan Plan [facility] for skilled care- pre cert obtained.  FATIMAH Saba    Patient/Family in Agreement with Plan yes    Plan Comments Pt with discharge order.  Called pt's son (Vincent Whitehead  [phone]) and notified him of pt's discharge.  Per Lucy  ( [phone])  [facility] has a bed and can accept pt today.    Discharge Summary in packet.  Lucy will print Discharge Summary for facility. Prescriptions sent to facility pharmacy.  Packet to RN.  [facility] requested to transport pt.  Plan [facility] for skilled care pre cert obtained.  ALISHA Godoy RN                   Discharge Codes    No documentation.                 Expected Discharge Date and Time       Expected Discharge Date Expected Discharge Time    May 22, 2025               Elinor Godoy RN

## 2025-05-22 NOTE — PROGRESS NOTES
"    Patient Name: Sacha Whitehead  :1930  89 y.o.      Patient Care Team:  Alvina Hauser MD as PCP - General (Geriatric Medicine)  John Chavarria MD as Referring Physician (Internal Medicine)  John Medrano MD as Consulting Physician (Hematology and Oncology)  Jhonny Yang MD as Consulting Physician (Urology)  Farrukh Cutler, PharmD as Pharmacist (Pharmacy)  Beatriz Snyder RPH as Pharmacist (Pharmacy)    Chief Complaint: follow up anemia     Interval History: he is resting in bed. No complaints.          Objective   Vital Signs  Temp:  [97.2 °F (36.2 °C)-98.3 °F (36.8 °C)] 98.3 °F (36.8 °C)  Heart Rate:  [71-95] 80  Resp:  [16-17] 16  BP: (117-147)/(55-79) 138/79    Intake/Output Summary (Last 24 hours) at 2025 0707  Last data filed at 2025 0642  Gross per 24 hour   Intake 385.83 ml   Output 400 ml   Net -14.17 ml     Flowsheet Rows      Flowsheet Row First Filed Value   Admission Height 177.8 cm (70\") Documented at 2025 1030   Admission Weight 98.7 kg (217 lb 9.5 oz) Documented at 2025 1030            Physical Exam:   General Appearance:    Alert, cooperative, in no acute distress   Lungs:     Clear to auscultation.  Normal respiratory effort and rate.      Heart:    irregular rhythm and normal rate, normal S1 and S2, no murmurs, gallops or rubs.     Chest Wall:    No abnormalities observed   Abdomen:     Soft, nontender, positive bowel sounds.     Extremities:   no cyanosis, clubbing or edema.  No marked joint deformities.  Adequate musculoskeletal strength.       Results Review:    Results from last 7 days   Lab Units 25  0539   SODIUM mmol/L 138   POTASSIUM mmol/L 4.4   CHLORIDE mmol/L 103   CO2 mmol/L 27.0   BUN mg/dL 27*   CREATININE mg/dL 0.78   GLUCOSE mg/dL 109*   CALCIUM mg/dL 8.9         Results from last 7 days   Lab Units 25  0539   WBC 10*3/mm3 5.07   HEMOGLOBIN g/dL 7.7*   HEMATOCRIT % 24.4*   PLATELETS 10*3/mm3 152     Results from last 7 days "   Lab Units 05/18/25  0616 05/17/25  0402 05/16/25  0410   INR  3.73* 3.57* 2.58*                       Medication Review:   ascorbic acid, 1,000 mg, Oral, Daily  atorvastatin, 10 mg, Oral, Nightly  carvedilol, 6.25 mg, Oral, BID With Meals  folic acid, 1,000 mcg, Oral, Daily  furosemide, 20 mg, Oral, Daily  insulin lispro, 2-7 Units, Subcutaneous, 4x Daily AC & at Bedtime  metFORMIN, 500 mg, Oral, BID With Meals  pantoprazole, 40 mg, Oral, QAM AC  vitamin B-12, 1,000 mcg, Oral, Daily              Assessment & Plan      Troponin elevation: Consistent with nonischemic myocardial injury secondary to significant anemia.  This is not ACS.  Patient has no chest pain.  EKG unchanged.  No further cardiac workup indicated.  Severe acute anemia, suspected acute blood loss, likely secondary to GI losses.  In setting of supratherapeutic INR.:  GI following and deferred scopes in favor of conservative management. Status post 3 PRBC transfusions.  Coagulopathy was reversed and he was started on apixaban 5/15/25.   Atrial fibrillation, permanent  Warfarin was stopped in the past due to recurrent GI bleeding. Then restarted and was tolerating well until now.   Sick sinus syndrome with intermittent ventricular pacing: His device is near [facility]IAN, continue outpatient follow-up. EP to chart review.   Hypertrophic cardiomyopathy   NSVT , ICD in place. Consider down grading to pacer.  Obstructive sleep apnea ,untreated      This is a difficult situation. He is a 89 year old that lives independently  - and really his only goal is to be able to stay this way with enough strength to get up and go to the bathroom.     I had a thorough discussion with his son Vincent yesterday we agreed to stop anticoagulation. We will see how he does at rehab and can reconsider as an outpatient.     His device went in to [facility] a few months ago. EP saw and after discussing with son, deactivated tachy therapies and decreased his rate and beta blocker to see if we can  pace him less.  Currently AF in 80s.    No objection to rehab. Will arrange outpatient follow up.      YANIRA Roa  [facility]  05/22/25  07:07 EDT

## 2025-05-22 NOTE — PLAN OF CARE
Goal Outcome Evaluation:  Plan of Care Reviewed With: patient        Progress: improving  Outcome Evaluation: no complaints voiced, denies pain, VSS, Afib on the monitor, Eyes closed at a long interval, resting quietly in his room

## 2025-05-22 NOTE — CASE MANAGEMENT/SOCIAL WORK
Case Management Discharge Note      Final Note: Pt discharged to [place] for skilled care.  ALISHA Godoy RN         Selected Continued Care - Admitted Since 5/13/2025       Destination Coordination complete.      Service Provider Services Address Phone Fax Patient Preferred    [facility] [address] [facility] [address] [phone] --              Durable Medical Equipment    No services have been selected for the patient.                Dialysis/Infusion    No services have been selected for the patient.                Home Medical Care    No services have been selected for the patient.                Therapy    No services have been selected for the patient.                Community Resources    No services have been selected for the patient.                Community & DME    No services have been selected for the patient.                    Transportation Services  Ambulance: [facility]    Final Discharge Disposition Code: 03 - skilled nursing facility (SNF)

## 2025-05-22 NOTE — DISCHARGE SUMMARY
[facility] Medicine Services  DISCHARGE SUMMARY    Patient Name: Sacha Whitehead  : 1930  MRN: [id number]    Date of Admission: 2025 10:29 AM  Date of Discharge: 2025  Primary Care Physician: Alvina Hauser MD    Consults       Date and Time Order Name Status Description    2025 12:36 PM Cardiac Electrophysiologist Inpatient Consult      2025 11:42 AM [facility] (on-call MD unless specified) Completed     2025 11:40 AM [facility] (on-call MD unless specified) Details      2025 11:40 AM Gastroenterology (on-call MD unless specified) Completed             Hospital Course       Active Hospital Problems    Diagnosis  POA    **Symptomatic anemia [D64.9]  Yes    Acute blood loss anemia [D62]  Yes    Type 2 diabetes mellitus with hyperglycemia [E11.65]  Yes    Supratherapeutic INR [R79.1]  Yes    History of stroke [Z86.73]  Not Applicable    Bilateral hearing loss [H91.93]  Yes    Warfarin anticoagulation [Z79.01]  Not Applicable    Sick sinus syndrome [I49.5]  Yes    Essential hypertension [I10]  Yes    Gastroesophageal reflux disease [K21.9]  Yes    Hypertrophic obstructive cardiomyopathy [I42.1]  Yes    Atrial fibrillation [I48.91]  Yes      Resolved Hospital Problems    Diagnosis Date Resolved POA    Toxic effect of warfarin, unintentional [T45.511A] 2025 Yes    Gastrointestinal hemorrhage [K92.2] 2025 Yes          Hospital Course:  Sacha Whitehead is a 89 y.o. male  with Symptomatic anemia in the setting of Hemoccult positive stool concerning for blood loss anemia with warfarin toxicity.  Patient was found to have worsening symptomatic anemia that was a presumed GI bleed.  He was treated with PPI and IV iron.  Initially his warfarin was switched to Eliquis however his anemia continued to get worse.  Ultimately cardiology decided the risks of anticoagulation outweighed the benefits.  Patient was taken off anticoagulation and has done well.  He is quite debilitated from his  hospitalization and needs to go to SNF for subacute rehabilitation.  Have spoken with cardiology today and they have cleared him for discharge.  They have also adjusted his cardiac medications including lowering his beta-blocker.  Please see full medication list below that was discussed with cardiology today.      Symptomatic anemia/probable GI bleed, iron deficiency anemia and acute blood loss anemia  - Has received multiple, last transfusion on 5/21, recommend to recheck hemoglobin within 1 week  -GI consulted-conservative management was recommended and patient agreed with this plan  -PPI daily likely for at least a month  -3d IV iron completed     Afib  Supratherapeutic INR,  warfarin toxicity, resolved  S/p PPM  -RC: Coreg decreased by cardiology  -AC: wafarin switched to Eliquis per Cardiology and now anticoagulation discontinued due to risk versus benefit assessment.  Current plan is to manage off anticoagulation.  Patient understands the increased risk of stroke with atrial fibrillation however in light of persistent bleeding issues and advanced age he prefers no anticoagulation.  -INR > 10 OA, s/p vitamin K and KCentra in ED     LE edema  - Lasix 20 mg daily, was on 10 mg at home prior to admission, tolerating it well     Elevated troponin  -Trop 85 > 61; EKG no acute ischemia  -Likely demand related to severe anemia, not consistent with ACS  -Cardiology eval'd- no acute ischemic eval indicated, no further issues     DM2 w/ hyperglycemia  -Initially held home medication, restart low-dose metformin and tolerating well     Physical debility: PT OT.  Discharge to SNF    At the time of discharge patient was told to take all medications as prescribed, keep all follow-up appointments, and call their doctor or return to the hospital with any worsening or concerning symptoms.    Please note that this note was made using Dragon voice recognition software               Day of Discharge     Subjective: Patient states  he feels fine.  He denies any new complaints today.  He reports good appetite.  He is eager to go to SNF for rehab.  He has no cardiac symptoms or other concerns.  He agrees to follow-up with his primary care and with cardiology.  No family currently at the bedside.    No current fevers or chills  No current shortness of breath or cough  No current nausea, vomiting, or diarrhea  No current chest pain or palpitations      Vital Signs:   Temp:  [97.2 °F (36.2 °C)-98.3 °F (36.8 °C)] 98.3 °F (36.8 °C)  Heart Rate:  [71-95] 80  Resp:  [16-17] 16  BP: (117-147)/(55-79) 138/79     Physical Exam:    Constitutional: Awake, alert, elderly appearing, no acute distress  HENT: NCAT, mucous membranes moist, poor hearing  Respiratory: No cough or wheezes, normal respirations, nonlabored breathing   Cardiovascular: Pulse rate is normal, palpable radial pulses  Gastrointestinal:  Soft, nontender, nondistended  Musculoskeletal: BMI is 31, frail and debilitated in appearance, some chronic lower extremity swelling is present  Psychiatric: Appropriate affect, cooperative, conversational  Neurologic: Somewhat poor historian, no slurred speech or facial droop, follows commands  Skin: No rashes or jaundice, warm    Pertinent  and/or Most Recent Results     Results from last 7 days   Lab Units 05/22/25  0539 05/21/25  0519 05/20/25  0534 05/19/25  2209 05/19/25  0611 05/18/25  0616 05/17/25  0402 05/16/25  0410   WBC 10*3/mm3 5.07 5.89 6.15  --  6.13 6.10 5.54 5.29   HEMOGLOBIN g/dL 7.7* 7.2* 7.5* 7.3* 7.1* 7.2* 7.5* 7.3*   HEMATOCRIT % 24.4* 23.2* 24.4* 23.3* 23.2* 22.3* 25.1* 24.7*   PLATELETS 10*3/mm3 152 150 153  --  149 155 156 136*   SODIUM mmol/L 138 141 142  --  142 140 143 142   POTASSIUM mmol/L 4.4 4.2 4.3  --  4.4 4.1 4.4 3.8   CHLORIDE mmol/L 103 105 107  --  106 103 109* 108*   CO2 mmol/L 27.0 30.2* 28.0  --  29.6* 28.0 28.0 24.0   BUN mg/dL 27* 29* 27*  --  27* 29* 28* 26*   CREATININE mg/dL 0.78 0.88 0.94  --  1.00 1.15 1.06  1.10   GLUCOSE mg/dL 109* 117* 125*  --  119* 113* 119* 145*   CALCIUM mg/dL 8.9 9.3 9.2  --  9.7* 9.6 9.0 8.8     Results from last 7 days   Lab Units 05/18/25  0616 05/17/25  0402 05/16/25  0410   PROTIME Seconds 37.6* 36.2* 28.0*   INR  3.73* 3.57* 2.58*          Imaging Results (All)       Procedure Component Value Units Date/Time    XR Chest 1 View [[id number]] Collected: 05/13/25 1136     Updated: 05/13/25 1139    Narrative:      XR CHEST 1 VW-5/13/2025     HISTORY: Fatigue.     Heart size is mildly enlarged. Lungs appear free of acute infiltrates.  Cardiac pacemaker is seen in good position. There is mild thoracic  scoliosis. There is some aortic calcification.       Impression:      1. Cardiomegaly.  2. No acute infiltrates.        This report was finalized on 5/13/2025 11:36 AM by Dr. Fredrick Bennett M.D on Workstation: [id number]                       Results for orders placed during the hospital encounter of 01/11/18    Adult Transthoracic Echo Complete W/ Cont if Necessary Per Protocol    Interpretation Summary  · Left ventricular wall thickness is consistent with mild concentric hypertrophy.  · Left ventricular systolic function is low normal. Estimated EF = 50%.  · Right ventricular cavity is moderately dilated.  · Left atrial cavity size is moderately dilated.  · Mild to moderate aortic valve regurgitation is present.  · Trace tricuspid valve regurgitation is present.  · Calculated right ventricular systolic pressure from tricuspid regurgitation is 44.8 mmHg.        Discharge Details        Discharge Medications        New Medications        Instructions Start Date   pantoprazole 40 MG EC tablet  Commonly known as: PROTONIX   40 mg, Oral, Every Morning Before Breakfast   Start Date: May 23, 2025     sennosides-docusate 8.6-50 MG per tablet  Commonly known as: PERICOLACE   2 tablets, Oral, 2 Times Daily PRN             Changes to Medications        Instructions Start Date   atorvastatin 10 MG  tablet  Commonly known as: LIPITOR  What changed:   medication strength  See the new instructions.   10 mg, Oral, Nightly      carvedilol 6.25 MG tablet  Commonly known as: COREG  What changed:   medication strength  See the new instructions.   6.25 mg, Oral, 2 Times Daily With Meals      furosemide 20 MG tablet  Commonly known as: LASIX  What changed: how much to take   20 mg, Oral, Daily   Start Date: May 23, 2025     metFORMIN 500 MG tablet  Commonly known as: GLUCOPHAGE  What changed: See the new instructions.   500 mg, Oral, 2 Times Daily With Meals             Continue These Medications        Instructions Start Date   acetaminophen 500 MG tablet  Commonly known as: TYLENOL   1,000 mg, 2 Times Daily      ascorbic acid 1000 MG tablet  Commonly known as: VITAMIN C   1,000 mg, Daily      folic acid 1 MG tablet  Commonly known as: FOLVITE   1,000 mcg, Oral, Daily   Start Date: May 23, 2025     vitamin B-12 1000 MCG tablet  Commonly known as: CYANOCOBALAMIN   1,000 mcg, Daily             Stop These Medications      warfarin 5 MG tablet  Commonly known as: COUMADIN              No Known Allergies      Discharge Disposition:  Skilled Nursing Facility (DC - External)    Diet:  Hospital:  Diet Order   Procedures    Diet: Cardiac; Healthy Heart (2-3 Na+); Fluid Consistency: Thin (IDDSI 0)       Activity:  Activity Instructions       Activity as Tolerated      Up WIth Assist                   CODE STATUS:    Code Status and Medical Interventions: No CPR (Do Not Attempt to Resuscitate); Limited Support; NO intubation (DNI)   Ordered at: 05/13/25 1325     Code Status (Patient has no pulse and is not breathing):    No CPR (Do Not Attempt to Resuscitate)     Medical Interventions (Patient has pulse or is breathing):    Limited Support     Medical Intervention Limits:    NO intubation (DNI)       Future Appointments   Date Time Provider Department Center   10/20/2025 11:00 AM [facility] [place] [facility] DEVICE CHECK [facility]  None   10/20/2025 11:40 AM Shannan Red MD [facility]       Additional Instructions for the Follow-ups that You Need to Schedule       Discharge Follow-up with PCP   As directed       Currently Documented PCP:    Alvina Hauser MD    PCP Phone Number:    [phone]     Follow Up Details: 1 week aftert SNF, call for apmt        Discharge Follow-up with Specified Provider: Follow up with cardiology asd instructed.  Call with questions   As directed      To: Follow up with cardiology asd instructed.  Call with questions               Follow-up Information       Alvina Hauser MD .    Specialty: Geriatric Medicine  Why: 1 week aftert SNF, call for apmt  Contact information:  [address]  [facility] [address]  [phone]                                 Guy Schaeffer MD  05/22/25      Time Spent on Discharge:  I spent greater than 35 minutes on this discharge activity which included: face-to-face encounter with the patient, reviewing the data in the system, coordination of the care with the nursing staff as well as consultants, documentation, and entering orders.

## 2025-05-22 NOTE — PROGRESS NOTES
I saw patient this morning.  His heart rate is in the 80s after stopping Coreg.    Decision was made to deferred device placement.    Last therapies were inactivated yesterday    EP will see as needed.

## 2025-06-25 NOTE — PROGRESS NOTES
This encounter is for documentation purposes only. The patient was taken off anticoagulation during admission in May due to anemia. Risk outweighed benefit. Therefore, the medication management clinic will no longer be following.

## 2025-06-26 PROBLEM — N17.9 AKI (ACUTE KIDNEY INJURY): Status: ACTIVE | Noted: 2025-01-01

## 2025-06-26 NOTE — ED TRIAGE NOTES
Pt from [facility] via ems, called for abnormal labs, bun/creat 52-2.4 (last 27/0.78), lower ext edema

## 2025-06-27 NOTE — H&P
History and physical    Primary care physician  Dr. Capmbell    Chief complaint  Generalized weakness    History of present illness  89-year-old white male with history of diabetes mellitus hypertension cardiomyopathy atrial fibrillation chronic anemia who is a nursing home resident brought to the emergency room at [facility] with generalized weakness and abnormal labs.  Patient found to have acute kidney injury admitted for management.  Patient is DNR per his wishes and family wants palliative care consult.  Patient in no distress but poor historian and most of the history obtained from the family chart nursing staff and old record.    PAST MEDICAL HISTORY   Aneurysm of abdominal aorta      Angiectasia      Aortic valve insufficiency      Arthritis      BPPV (benign paroxysmal positional vertigo)      CAD in native artery      Colon cancer      Confusion      Deafness      Diabetes mellitus      Enlarged prostate without lower urinary tract symptoms (luts)      Esophageal reflux      Facial droop      Gout      H/O concentric left ventricular hypertrophy (LVH)      Hx of being hospitalized      Hx of being hospitalized      Hx of being hospitalized      Hx of being hospitalized      Hx of being hospitalized      Mitral regurgitation      Obstructive hypertrophic cardiomyopathy      HUMAIRA on CPAP      Peptic ulcer      PND (paroxysmal nocturnal dyspnea)      Reading comprehension disorder      Seizures      Stroke      Transient cerebral ischemia      Ventricular tachycardia        PAST SURGICAL HISTORY              Procedure Laterality Date    CARDIAC DEFIBRILLATOR PLACEMENT        CARDIAC ELECTROPHYSIOLOGY PROCEDURE N/A 10/28/2016     Procedure: ICD battery change;  Surgeon: Nael Marcial MD;  Location: [facility] INVASIVE LOCATION;  Service:     CARDIAC PACEMAKER PLACEMENT         Pacemaker Permanent Placement    COLON SURGERY         Laparoscopy Partial Colectomy - Right, dx of tubulovillous adenoma     "ENDOSCOPY N/A 2017     Procedure: ESOPHAGOGASTRODUODENOSCOPY WITH FOREIGN BODY REMOVAL;  Surgeon: Lorena Spencer MD;  Location: [facility] ENDOSCOPY;  Service:     ENDOSCOPY AND COLONOSCOPY         Showing tumor in the transverse colon.     ENDOSCOPY AND COLONOSCOPY   2014     EGD negative; colonoscopy with an anastomotic ulcer that was over sewn and cauterized.     EYE SURGERY        TOTAL KNEE ARTHROPLASTY Bilateral          FAMILY HISTORY           Problem Relation Age of Onset    Diabetes Father           Type 2 Diabetes Mellitus    Breast cancer Sister 59    Lung cancer Sister      Diabetes Brother           Type 2 Diabetes Mellitus    Heart disease Brother      Heart disease Other      Stroke Other           Stroke Syndrome    No Known Problems Son      No Known Problems Daughter      Heart disease Mother        SOCIAL HISTORY                 Socioeconomic History    Marital status:        Spouse name: Leslie    Number of children: 4    Years of education: College   Tobacco Use    Smoking status: Former       Current packs/day: 0.00       Average packs/day: 1 pack/day for 10.0 years (10.0 ttl pk-yrs)       Types: Cigarettes       Quit date:        Years since quittin.5    Smokeless tobacco: Never    Tobacco comments:       40 YRS AGO   Vaping Use    Vaping status: Never Used   Substance and Sexual Activity    Alcohol use: Yes       Comment: 1 drink a year//Caffeine use: None    Drug use: No    Sexual activity: Not Currently         ALLERGIES  Patient has no known allergies.  Home medications reviewed     REVIEW OF SYSTEMS  All systems reviewed and negative except for those discussed in HPI.      PHYSICAL EXAM  Blood pressure 108/48, pulse 70, temperature 97.4 °F (36.3 °C), temperature source Oral, resp. rate 15, height 177.8 cm (70\"), weight 94.2 kg (207 lb 10.8 oz), SpO2 92%.    GENERAL: Awake and alert no acute distress  HENT: nares patent  EYES: no scleral icterus  CV: regular " rhythm, normal rate  RESPIRATORY: normal effort and moving air bilaterally  ABDOMEN: soft nontender bowel sounds positive  MUSCULOSKELETAL: no deformity, 3+ edema to legs bilaterally  NEURO: alert, moves all extremities, follows commands  PSYCH:  calm, cooperative  SKIN: warm, dry    LAB RESULTS  Lab Results (last 24 hours)       Procedure Component Value Units Date/Time    Basic Metabolic Panel [[id number]]  (Abnormal) Collected: 06/27/25 0440    Specimen: Blood Updated: 06/27/25 0551     Glucose 140 mg/dL      BUN 49.0 mg/dL      Creatinine 2.01 mg/dL      Sodium 143 mmol/L      Potassium 3.8 mmol/L      Chloride 107 mmol/L      CO2 25.2 mmol/L      Calcium 9.2 mg/dL      BUN/Creatinine Ratio 24.4     Anion Gap 10.8 mmol/L      eGFR 30.0 mL/min/1.73     Narrative:      GFR Categories in Chronic Kidney Disease (CKD)              GFR Category          GFR (mL/min/1.73)    Interpretation  G1                    90 or greater        Normal or high (1)  G2                    60-89                Mild decrease (1)  G3a                   45-59                Mild to moderate decrease  G3b                   30-44                Moderate to severe decrease  G4                    15-29                Severe decrease  G5                    14 or less           Kidney failure    (1)In the absence of evidence of kidney disease, neither GFR category G1 or G2 fulfill the criteria for CKD.    eGFR calculation 2021 CKD-EPI creatinine equation, which does not include race as a factor    CBC & Differential [[id number]]  (Abnormal) Collected: 06/27/25 0440    Specimen: Blood Updated: 06/27/25 0530    Narrative:      The following orders were created for panel order CBC & Differential.  Procedure                               Abnormality         Status                     ---------                               -----------         ------                     CBC Auto Differential[[id number]]        Abnormal            Final result                  Please view results for these tests on the individual orders.    CBC Auto Differential [[id number]]  (Abnormal) Collected: 06/27/25 0440    Specimen: Blood Updated: 06/27/25 0530     WBC 6.90 10*3/mm3      RBC 2.99 10*6/mm3      Hemoglobin 9.7 g/dL      Hematocrit 30.8 %      .0 fL      MCH 32.4 pg      MCHC 31.5 g/dL      RDW 18.5 %      RDW-SD 70.5 fl      MPV 9.9 fL      Platelets 169 10*3/mm3      Neutrophil % 81.8 %      Lymphocyte % 6.1 %      Monocyte % 10.3 %      Eosinophil % 0.3 %      Basophil % 0.3 %      Immature Grans % 1.2 %      Neutrophils, Absolute 5.65 10*3/mm3      Lymphocytes, Absolute 0.42 10*3/mm3      Monocytes, Absolute 0.71 10*3/mm3      Eosinophils, Absolute 0.02 10*3/mm3      Basophils, Absolute 0.02 10*3/mm3      Immature Grans, Absolute 0.08 10*3/mm3      nRBC 0.0 /100 WBC     Comprehensive Metabolic Panel [[id number]]  (Abnormal) Collected: 06/26/25 1933    Specimen: Blood from Arm, Right Updated: 06/26/25 2005     Glucose 137 mg/dL      BUN 50.0 mg/dL      Creatinine 2.23 mg/dL      Sodium 145 mmol/L      Potassium 3.7 mmol/L      Chloride 107 mmol/L      CO2 24.3 mmol/L      Calcium 9.2 mg/dL      Total Protein 6.2 g/dL      Albumin 3.5 g/dL      ALT (SGPT) 11 U/L      AST (SGOT) 18 U/L      Alkaline Phosphatase 86 U/L      Total Bilirubin 0.5 mg/dL      Globulin 2.7 gm/dL      A/G Ratio 1.3 g/dL      BUN/Creatinine Ratio 22.4     Anion Gap 13.7 mmol/L      eGFR 26.5 mL/min/1.73     Narrative:      GFR Categories in Chronic Kidney Disease (CKD)              GFR Category          GFR (mL/min/1.73)    Interpretation  G1                    90 or greater        Normal or high (1)  G2                    60-89                Mild decrease (1)  G3a                   45-59                Mild to moderate decrease  G3b                   30-44                Moderate to severe decrease  G4                    15-29                Severe decrease  G5                    14 or less            Kidney failure    (1)In the absence of evidence of kidney disease, neither GFR category G1 or G2 fulfill the criteria for CKD.    eGFR calculation 2021 CKD-EPI creatinine equation, which does not include race as a factor    BNP [[id number]]  (Abnormal) Collected: 06/26/25 1933    Specimen: Blood from Arm, Right Updated: 06/26/25 2005     proBNP 4,840.0 pg/mL     Narrative:      This assay is used as an aid in the diagnosis of individuals suspected of having heart failure. It can be used as an aid in the diagnosis of acute decompensated heart failure (ADHF) in patients presenting with signs and symptoms of ADHF to the emergency department (ED). In addition, NT-proBNP of <300 pg/mL indicates ADHF is not likely.    Age Range Result Interpretation  NT-proBNP Concentration (pg/mL:      <50             Positive            >450                   Gray                 300-450                    Negative             <300    50-75           Positive            >900                  Gray                300-900                  Negative            <300      >75             Positive            >1800                  Gray                300-1800                  Negative            <300    CBC & Differential [[id number]]  (Abnormal) Collected: 06/26/25 1933    Specimen: Blood from Arm, Right Updated: 06/26/25 1945    Narrative:      The following orders were created for panel order CBC & Differential.  Procedure                               Abnormality         Status                     ---------                               -----------         ------                     CBC Auto Differential[[id number]]        Abnormal            Final result                 Please view results for these tests on the individual orders.    CBC Auto Differential [[id number]]  (Abnormal) Collected: 06/26/25 1933    Specimen: Blood from Arm, Right Updated: 06/26/25 1945     WBC 5.52 10*3/mm3      RBC 2.84 10*6/mm3      Hemoglobin 9.1 g/dL       Hematocrit 29.3 %      .2 fL      MCH 32.0 pg      MCHC 31.1 g/dL      RDW 18.4 %      RDW-SD 68.5 fl      MPV 10.3 fL      Platelets 175 10*3/mm3      Neutrophil % 80.6 %      Lymphocyte % 8.2 %      Monocyte % 9.6 %      Eosinophil % 0.9 %      Basophil % 0.2 %      Immature Grans % 0.5 %      Neutrophils, Absolute 4.45 10*3/mm3      Lymphocytes, Absolute 0.45 10*3/mm3      Monocytes, Absolute 0.53 10*3/mm3      Eosinophils, Absolute 0.05 10*3/mm3      Basophils, Absolute 0.01 10*3/mm3      Immature Grans, Absolute 0.03 10*3/mm3      nRBC 0.0 /100 WBC           Imaging Results (Last 24 Hours)       ** No results found for the last 24 hours. **          Scan on 5/13/2025 1047 by [employer], [place]: ECG 12-LEAD         Author: -- Service: -- Author Type: --   Filed: Date of Service: Creation Time:   Status: (Other)   HEART RATE=62  bpm  RR Htgynjmq=635  ms  DE Interval=  ms  P Horizontal Axis=  deg  P Front Axis=  deg  QRSD Interval=99  ms  QT Paocwbtx=738  ms  OVcI=410  ms  QRS Axis=-10  deg  T Wave Axis=-66  deg  - ABNORMAL ECG -  Afib/flut and V-paced complexes  Abnormal R-wave progression, early transition  Borderline  repol abnormality, diffuse leads  SINCE PREVIOUS TRACING, st depression is more pronouncerd          Current Facility-Administered Medications:     ascorbic acid (VITAMIN C) tablet 500 mg, 500 mg, Oral, Daily, Jonathon Gutiérrez MD, 500 mg at 06/27/25 0942    atorvastatin (LIPITOR) tablet 10 mg, 10 mg, Oral, Nightly, Jonathon Gutiérrez MD    carvedilol (COREG) tablet 6.25 mg, 6.25 mg, Oral, BID With Meals, Jonathon Gutiérrez MD    famotidine (PEPCID) tablet 20 mg, 20 mg, Oral, BID, Jonathon Gutiérrez MD, 20 mg at 06/27/25 0945    ferrous sulfate tablet 325 mg, 325 mg, Oral, BID, Jonathon Gutiérrez MD, 325 mg at 06/27/25 0942    folic acid (FOLVITE) tablet 1,000 mcg, 1,000 mcg, Oral, Daily, Jonathon Gutiérrez MD, 1,000 mcg at 06/27/25 0942    hydrocortisone-bacitracin-zinc oxide-nystatin (MAGIC BARRIER) ointment 1  Application, 1 Application, Topical, TID, Eusebia Gutiérrez MD    ipratropium-albuterol (DUO-NEB) nebulizer solution 3 mL, 3 mL, Nebulization, Q4H PRN, Eusebia Gutiérrez MD    Menthol-Zinc Oxide 1 Application, 1 Application, Topical, BID, Eusebia Gutiérrez MD    sennosides-docusate (PERICOLACE) 8.6-50 MG per tablet 2 tablet, 2 tablet, Oral, BID PRN, Eusebia Gutiérrez MD    sodium chloride 0.45 % infusion, 75 mL/hr, Intravenous, Continuous, Eusebia Gutiérrez MD, Last Rate: 75 mL/hr at 06/27/25 0431, 75 mL/hr at 06/27/25 0431    [COMPLETED] Insert Peripheral IV, , , Once **AND** sodium chloride 0.9 % flush 10 mL, 10 mL, Intravenous, PRN, Eusebia Gutiérrez MD    vitamin B-12 (CYANOCOBALAMIN) tablet 1,000 mcg, 1,000 mcg, Oral, Daily, Esuebia Gutiérrez MD, 1,000 mcg at 06/27/25 0942     ASSESSMENT  Acute kidney injury  Diabetes mellitus  Cardiomyopathy  Chronic atrial fibrillation  Chronic anemia  Hypertension  Hyperlipidemia    PLAN  Admit  IVF  Hold diuretics Glucophage and Protonix  Supportive care  Palliative care consult  Adjust nursing home medication  Stress ulcer DVT prophylaxis  DNR  Discussed with nursing staff  Follow closely further recommendation current hospital course    EUSEBIA GUTIÉRREZ MD

## 2025-06-27 NOTE — PLAN OF CARE
Goal Outcome Evaluation:   Palliative care consulted this am. Patient condition rapidly declined this afternoon, palliative care APRN Cleo at bedside and spoke with son, Vincent, over the phone. Comfort measures ordered and patient passed at 1427.

## 2025-06-27 NOTE — ED PROVIDER NOTES
EMERGENCY DEPARTMENT ENCOUNTER    Room Number:  23/23  PCP: Lupillo Campbell MD    HPI:  Chief Complaint: Abnormal labs  A complete HPI/ROS/PMH/PSH/SH/FH are unobtainable due to:  none  Context: Sacha Whitehead is a 89 y.o. male who presents to the ED c/o acute  abnormal labs. Patient found to have COLLEEN at facility. He has no complaints. Denies vomiting or diarrhea.         PAST MEDICAL HISTORY  Active Ambulatory Problems     Diagnosis Date Noted    Atrial fibrillation 03/02/2016    Diabetes type 2, controlled 03/02/2016    Cerebrovascular accident (CVA) due to embolism of cerebral artery 03/07/2016    Transient insomnia 03/10/2016    Aneurysm of aortic root 03/10/2016    Vascular disorder 03/10/2016    Benign non-nodular prostatic hyperplasia with lower urinary tract symptoms 03/10/2016    Gastroesophageal reflux disease 03/10/2016    Essential hypertension 03/10/2016    Memory loss 03/10/2016    Microalbuminuria 03/10/2016    Mitral valve insufficiency 03/10/2016    Hypertrophic obstructive cardiomyopathy 03/10/2016    Obstructive sleep apnea syndrome 03/10/2016    Onychomycosis of toenail 03/10/2016    Renal artery stenosis 03/10/2016    Seizure disorder 03/10/2016    Sick sinus syndrome 03/10/2016    Tubular adenoma of colon 03/10/2016    Vitamin D deficiency 03/10/2016    SCC (squamous cell carcinoma), face 07/18/2016    Thoracic aortic aneurysm without rupture 10/24/2016    Warfarin anticoagulation 04/24/2017    Melanoma 08/28/2017    Prostate cancer 02/07/2018    Bilateral hearing loss 04/02/2018    Elevated blood uric acid level 04/02/2018    Actinic keratosis 07/31/2020    Nonrheumatic aortic valve insufficiency 01/06/2023    History of stroke 01/06/2023    Symptomatic anemia 05/13/2025    Supratherapeutic INR 05/13/2025    Type 2 diabetes mellitus with hyperglycemia 05/18/2025    Acute blood loss anemia 05/22/2025     Resolved Ambulatory Problems     Diagnosis Date Noted    Hypertension 03/02/2016    Anemia  03/02/2016    Bleeding from unknown site 03/05/2016    Abdominal aortic aneurysm 03/10/2016    Benign paroxysmal positional vertigo 03/10/2016    Fatigue 03/10/2016    Gastrointestinal hemorrhage 03/10/2016    Paroxysmal nocturnal dyspnea 03/10/2016    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    Cerebrovascular accident 03/10/2016    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    Healthcare maintenance 11/18/2016    Abnormal urinalysis 05/18/2017    Daytime somnolence 05/18/2017    Esophageal foreign body 08/29/2017    Prostate nodule 12/01/2017    Partial epilepsy with impairment of consciousness 05/13/2015    Right upper quadrant abdominal pain 10/12/2018    Right upper quadrant abdominal abscess 02/08/2019    Liver enzyme elevation 02/08/2019    Toxic effect of warfarin, unintentional 05/22/2025     Past Medical History:   Diagnosis Date    Aneurysm of abdominal aorta     Angiectasia     Aortic valve insufficiency     Arthritis     BPPV (benign paroxysmal positional vertigo)     CAD in native artery     Colon cancer     Confusion     Deafness     Diabetes mellitus     Enlarged prostate without lower urinary tract symptoms (luts)     Esophageal reflux     Facial droop     Gout     H/O concentric left ventricular hypertrophy (LVH)     Hx of being hospitalized     Hx of being hospitalized     Hx of being hospitalized     Hx of being hospitalized     Hx of being hospitalized     Mitral regurgitation     Obstructive hypertrophic cardiomyopathy     HUMAIRA on CPAP     Peptic ulcer     PND (paroxysmal nocturnal dyspnea)     Reading comprehension disorder     Seizures     Stroke     Transient cerebral ischemia     Ventricular tachycardia          PAST SURGICAL HISTORY  Past Surgical History:   Procedure Laterality Date    CARDIAC DEFIBRILLATOR PLACEMENT      CARDIAC ELECTROPHYSIOLOGY PROCEDURE N/A 10/28/2016    Procedure: ICD battery change;  Surgeon: Nael Marcial MD;  Location:  [facility];   Service:     CARDIAC PACEMAKER PLACEMENT      Pacemaker Permanent Placement    COLON SURGERY      Laparoscopy Partial Colectomy - Right, dx of tubulovillous adenoma    ENDOSCOPY N/A 2017    Procedure: ESOPHAGOGASTRODUODENOSCOPY WITH FOREIGN BODY REMOVAL;  Surgeon: Lorena Spencer MD;  Location: [facility] ENDOSCOPY;  Service:     ENDOSCOPY AND COLONOSCOPY      Showing tumor in the transverse colon.     ENDOSCOPY AND COLONOSCOPY  2014    EGD negative; colonoscopy with an anastomotic ulcer that was over sewn and cauterized.     EYE SURGERY      TOTAL KNEE ARTHROPLASTY Bilateral          FAMILY HISTORY  Family History   Problem Relation Age of Onset    Diabetes Father         Type 2 Diabetes Mellitus    Breast cancer Sister 59    Lung cancer Sister     Diabetes Brother         Type 2 Diabetes Mellitus    Heart disease Brother     Heart disease Other     Stroke Other         Stroke Syndrome    No Known Problems Son     No Known Problems Daughter     Heart disease Mother          SOCIAL HISTORY  Social History     Socioeconomic History    Marital status:      Spouse name: Leslie    Number of children: 4    Years of education: College   Tobacco Use    Smoking status: Former     Current packs/day: 0.00     Average packs/day: 1 pack/day for 10.0 years (10.0 ttl pk-yrs)     Types: Cigarettes     Quit date:      Years since quittin.5    Smokeless tobacco: Never    Tobacco comments:     40 YRS AGO   Vaping Use    Vaping status: Never Used   Substance and Sexual Activity    Alcohol use: Yes     Comment: 1 drink a year//Caffeine use: None    Drug use: No    Sexual activity: Not Currently         ALLERGIES  Patient has no known allergies.        REVIEW OF SYSTEMS  Review of Systems     Included in HPI  All systems reviewed and negative except for those discussed in HPI.       PHYSICAL EXAM  ED Triage Vitals [25 1910]   Temp Heart Rate Resp BP SpO2   97.4 °F (36.3 °C) 88 18 119/93 95 %      Temp src  Heart Rate Source Patient Position BP Location FiO2 (%)   -- -- -- -- --       Physical Exam      GENERAL: no acute distress  HENT: nares patent  EYES: no scleral icterus  CV: regular rhythm, normal rate  RESPIRATORY: normal effort  ABDOMEN: soft nontender  MUSCULOSKELETAL: no deformity, 3+ edema to legs bilaterally  NEURO: alert, moves all extremities, follows commands  PSYCH:  calm, cooperative  SKIN: warm, dry    Vital signs and nursing notes reviewed.          LAB RESULTS  Recent Results (from the past 24 hours)   Comprehensive Metabolic Panel    Collection Time: 06/26/25  7:33 PM    Specimen: Arm, Right; Blood   Result Value Ref Range    Glucose 137 (H) 65 - 99 mg/dL    BUN 50.0 (H) 8.0 - 23.0 mg/dL    Creatinine 2.23 (H) 0.76 - 1.27 mg/dL    Sodium 145 136 - 145 mmol/L    Potassium 3.7 3.5 - 5.2 mmol/L    Chloride 107 98 - 107 mmol/L    CO2 24.3 22.0 - 29.0 mmol/L    Calcium 9.2 8.2 - 9.6 mg/dL    Total Protein 6.2 6.0 - 8.5 g/dL    Albumin 3.5 3.5 - 5.2 g/dL    ALT (SGPT) 11 1 - 41 U/L    AST (SGOT) 18 1 - 40 U/L    Alkaline Phosphatase 86 39 - 117 U/L    Total Bilirubin 0.5 0.0 - 1.2 mg/dL    Globulin 2.7 gm/dL    A/G Ratio 1.3 g/dL    BUN/Creatinine Ratio 22.4 7.0 - 25.0    Anion Gap 13.7 5.0 - 15.0 mmol/L    eGFR 26.5 (L) >60.0 mL/min/1.73   BNP    Collection Time: 06/26/25  7:33 PM    Specimen: Arm, Right; Blood   Result Value Ref Range    proBNP 4,840.0 (H) 0.0 - 1,800.0 pg/mL   CBC Auto Differential    Collection Time: 06/26/25  7:33 PM    Specimen: Arm, Right; Blood   Result Value Ref Range    WBC 5.52 3.40 - 10.80 10*3/mm3    RBC 2.84 (L) 4.14 - 5.80 10*6/mm3    Hemoglobin 9.1 (L) 13.0 - 17.7 g/dL    Hematocrit 29.3 (L) 37.5 - 51.0 %    .2 (H) 79.0 - 97.0 fL    MCH 32.0 26.6 - 33.0 pg    MCHC 31.1 (L) 31.5 - 35.7 g/dL    RDW 18.4 (H) 12.3 - 15.4 %    RDW-SD 68.5 (H) 37.0 - 54.0 fl    MPV 10.3 6.0 - 12.0 fL    Platelets 175 140 - 450 10*3/mm3    Neutrophil % 80.6 (H) 42.7 - 76.0 %    Lymphocyte  % 8.2 (L) 19.6 - 45.3 %    Monocyte % 9.6 5.0 - 12.0 %    Eosinophil % 0.9 0.3 - 6.2 %    Basophil % 0.2 0.0 - 1.5 %    Immature Grans % 0.5 0.0 - 0.5 %    Neutrophils, Absolute 4.45 1.70 - 7.00 10*3/mm3    Lymphocytes, Absolute 0.45 (L) 0.70 - 3.10 10*3/mm3    Monocytes, Absolute 0.53 0.10 - 0.90 10*3/mm3    Eosinophils, Absolute 0.05 0.00 - 0.40 10*3/mm3    Basophils, Absolute 0.01 0.00 - 0.20 10*3/mm3    Immature Grans, Absolute 0.03 0.00 - 0.05 10*3/mm3    nRBC 0.0 0.0 - 0.2 /100 WBC       Ordered the above labs and reviewed the results.        RADIOLOGY  No Radiology Exams Resulted Within Past 24 Hours    Ordered the above noted radiological studies. Reviewed by me in PACS.        MEDICATIONS GIVEN IN ER  Medications   sodium chloride 0.9 % flush 10 mL (has no administration in time range)   sodium chloride 0.9 % bolus 500 mL (has no administration in time range)         ORDERS PLACED DURING THIS VISIT:  Orders Placed This Encounter   Procedures    Comprehensive Metabolic Panel    BNP    CBC Auto Differential    Monitor Blood Pressure    Continuous Pulse Oximetry    Insert Peripheral IV    Initiate Observation Status    CBC & Differential         OUTPATIENT MEDICATION MANAGEMENT:  Current Facility-Administered Medications Ordered in Epic   Medication Dose Route Frequency Provider Last Rate Last Admin    sodium chloride 0.9 % bolus 500 mL  500 mL Intravenous Once Royal Whipple II, MD        sodium chloride 0.9 % flush 10 mL  10 mL Intravenous PRN Royal Whipple II, MD         Current Outpatient Medications Ordered in Epic   Medication Sig Dispense Refill    acetaminophen (TYLENOL) 500 MG tablet Take 2 tablets by mouth 2 (Two) Times a Day.      ascorbic acid (VITAMIN C) 1000 MG tablet Take 1 tablet by mouth Daily.      atorvastatin (LIPITOR) 10 MG tablet Take 1 tablet by mouth Every Night. 30 tablet 0    carvedilol (COREG) 6.25 MG tablet Take 1 tablet by mouth 2 (Two) Times a Day With Meals. 60  tablet 0    folic acid (FOLVITE) 1 MG tablet Take 1 tablet by mouth Daily. 30 tablet 0    furosemide (LASIX) 20 MG tablet Take 1 tablet by mouth Daily. 30 tablet 0    metFORMIN (GLUCOPHAGE) 500 MG tablet Take 1 tablet by mouth 2 (Two) Times a Day With Meals. 60 tablet 0    pantoprazole (PROTONIX) 40 MG EC tablet Take 1 tablet by mouth Every Morning Before Breakfast. 30 tablet 0    sennosides-docusate (PERICOLACE) 8.6-50 MG per tablet Take 2 tablets by mouth 2 (Two) Times a Day As Needed for Constipation.      vitamin B-12 (CYANOCOBALAMIN) 1000 MCG tablet Take 1 tablet by mouth Daily.         PROCEDURES  Procedures          MEDICAL DECISION MAKING, PROGRESS, and CONSULTS    Discussion below represents my analysis of pertinent findings related to patient's condition, differential diagnosis, treatment plan and final disposition.          Differential diagnosis:    Prerenal versus intrinsic versus postrenal COLLEEN               Independent interpretation of labs, radiology studies, and discussions with consultants:  ED Course as of 06/26/25 2009   Thu Jun 26, 2025 2000 Hemoglobin(!): 9.1  Baseline in the 7's [TD]      ED Course User Index  [TD] Royal Whipple II, MD         I reviewed medical chart.  He has had no recent echocardiograms.  Given his 3+ lower extremity edema, I will give him a limited dose of IV fluid and we will assess his response to that intervention.    I discussed the case with Dr. Gutiérrez, hospitalist.  He will admit.        Clinical Scores:                                   DIAGNOSIS  Final diagnoses:   COLLEEN (acute kidney injury)         DISPOSITION  Admit      Latest Documented Vital Signs:  As of 20:09 EDT  BP- 132/68 HR- 86 Temp- 97.4 °F (36.3 °C) O2 sat- 99%      --    Please note that portions of this were completed with a voice recognition program.       Note Disclaimer: At [facility], we believe that sharing information builds trust and better relationships. You are receiving this note  because you are receiving care at [facility] or recently visited. It is possible you will see health information before a provider has talked with you about it. This kind of information can be easy to misunderstand. To help you fully understand what it means for your health, we urge you to discuss this note with your provider.         Royal Whipple II, MD  06/26/25 2010

## 2025-06-27 NOTE — NURSING NOTE
Reason for Visit: WOC Team consult for right breast, sacrum, and RLE wound POA. PMH of diabetes mellitus, hypertension, cardiomyopathy, atrial fibrillation, and chronic anemia who is a nursing home resident brought to the emergency room at [facility] with generalized weakness and abnormal labs with acute kidney injury. Pt is pleasantly confused, hard of hearing, able to turn and reposition self with some assistance, he is incontinent of bowel and bladder. RLE is wrapped, photo of wound reviewed. Gluteal skin and right breast irritation assessment noted below. Heels are negative.      06/27/25 0840   Wound 05/14/25 0457 Right lower leg   Placement Date/Time: 05/14/25 0457   Present on Original Admission: Yes  Side: Right  Orientation: lower  Location: leg   Dressing Appearance dry;intact   Wound 05/13/25 1433 gluteal   Placement Date/Time: 05/13/25 1433   Location: gluteal   Base moist;red  (IAD with yeast component related to MASD, shallow full thickness linear slit in gluteal cleft related to moisture and friction.)   Periwound redness;warm   Periwound Temperature warm   Periwound Skin Turgor soft   Drainage Amount none   Care, Wound barrier applied  (Magic Barrier and Calmospetine ointment ordered.)   Wound 06/26/25 2143 Right lower breast Irritant Contact Perspiration   Placement Date/Time: 06/26/25 2143   Present on Original Admission: Yes  Side: Right  Orientation: lower  Location: breast  Primary Wound Type: Irritant Contact  Secondary Wound Type - Irritant Contact Dermatitis: Perspiration   Base   (confluent maculopapular rash consistent with yeast dermatitis.)   Periwound yeast   Periwound Temperature warm   Dressing Care open to air  (Magic barrier cream has been ordered)   Skin Interventions   Pressure Reduction Devices pressure-redistributing mattress utilized  (I have asked staff to order pt a Pro Plus mattress)     Treatment Plan/Recommendations: Transfer pt to a Pro Plus mattress with  turning side to side. Heels should be off-loaded at all times with heel dressings for protection. Magic Barrier cream to right breast fold TID. Calmoseptine with Magic Barrier Cream to gluteal skin TID. The creams can be applied to the male incontinence wrap and placed over the gluteal skin. Wound care and prevention standing orders placed into EPIC. Discussed with RN    Wound Team Follow up Plan: No WOC nurse follow up needed, please re-consult if skin does not improve with current wound care.

## 2025-06-27 NOTE — CONSULTS
.      [facility]   Inpatient Palliative Care Consultation  Patient Name: Sacha Whitehead   Patient : 1930   Date: 2025   Reason for Consultation: assistance with clarification of goals of care  Consults      Chief Complaint: goals of care    Information obtained from: relative(s), brandy Kaur, past medical records, and RN    Summary of Palliative Illness and Palliative Assessment:  is a 89 yo male with primary palliative diagnosis of cardiomyopathy with comorbids of DM, HTN, Afib, anemia who presented to ED from [facility] where he was having increased weakness. I spoke to brandy Kaur on the phone and he reported pt was not progressing in rehab and seemed to be declining. He wishes for him to return home but realistically knows he may not do that. After seeing pt today, he appears to be actively dying. I talked to Vincent and suggested moving pt to 4 P and focusing on comfort care with hospice consult. Vincent was okay with that, but wanted pt to remain on oxygen which I said was no problem and he was also worried about IV fluids. I told him we could leave those on as well and if it got to the point where the fluids were causing harm that we could have a different conversation. Vincent was on board with that and would like for pt to be comfortable and transfer to  with hospice consult. I placed the orders to move pt, hospice consult placed as well as comfort medications, morphine 1 mg q 1 prn and lorazepam 0.5 mg q 1 prn , discontinued all other orders.       Discussion of Patient/Family Treatment Preferences and Goals of Care: There was a voluntary discussion of advance planning and goals of care discussion. The following were present for the visit: brandy Kaur     Summary of Discussion: Pt to be transferred to , hospice consult placed , morphine and lorazepam orders in place.         Review of Systems: Review of Systems - see hpi       Past Medical and Surgical History:    Past  Medical History:   Diagnosis Date    Anemia     Aneurysm of abdominal aorta     Dr. Red    Aneurysm of aortic root     Dr. Red    Angiectasia     Bleeding in stomach; EGD 5/2014    Aortic valve insufficiency     Arthritis     Osteoarthritis    Atrial fibrillation     Permanent, per Dr. Red; on long-term Coumadin anticoagulation since around 2008.    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    Enlarged prostate without lower urinary tract symptoms (luts)     Esophageal reflux     Facial droop     Right sided    Fatigue     Gastrointestinal hemorrhage 3/10/2016    Description: with angioectasia stomach 2013    Gout     H/O concentric left ventricular hypertrophy (LVH)     LVEF 50% documented on 06/14/2013 by echocardiogram.     Hx of being hospitalized     On 12/11/2013 with hemoglobin of 6 and heme-positive stool. EGD showed a telangiectasia which was lasered by Dr. Carolina Servin.     Hx of being hospitalized     On 04/04/2014 for surgical resection of bleeding at the anastomotic site in colon. Had surgical re-resection and discharged home.    Hx of being hospitalized     On 05/19/2014 with severe anemia.     Hx of being hospitalized     On 12/15/2014 with further resection of sites of possible blood loss based on angiography of the abdominal masses.     Hx of being hospitalized     At [facility] with a grand mal seizure and started on Keppra in 03/2015    Hypertension     Liver enzyme elevation 2/8/2019    Melanoma 8/28/2017    dx'd 2017     Memory loss     Microalbuminuria     Mitral regurgitation     Obstructive hypertrophic cardiomyopathy     Per Dr. Red    HUMAIRA on CPAP     Partial epilepsy with impairment of consciousness  "5/13/2015    Overview:  2015 IMO UPDATE  Overview:  2015 IMO UPDATE    Peptic ulcer     On EGD 2014    PND (paroxysmal nocturnal dyspnea)     Reading comprehension disorder     Renal artery stenosis     Seizures     [facility]    [facility] syndrome     With hx non-sustained Vtach s/p pacer and AICD    Stroke     Occipital stroke with vision loss in 07/2014.    Transient cerebral ischemia     Embolic    Tubular adenoma of colon     Transverse colon, s/p resection (surgical), cause of severe FE deficiency anemia.    Ventricular tachycardia     Vitamin D deficiency       Past Surgical History:   Procedure Laterality Date    CARDIAC DEFIBRILLATOR PLACEMENT      CARDIAC ELECTROPHYSIOLOGY PROCEDURE N/A 10/28/2016    Procedure: ICD battery change;  Surgeon: Nael Marcial MD;  Location: [facility] CATH INVASIVE LOCATION;  Service:     CARDIAC PACEMAKER PLACEMENT      Pacemaker Permanent Placement    COLON SURGERY      Laparoscopy Partial Colectomy - Right, dx of tubulovillous adenoma    ENDOSCOPY N/A 8/29/2017    Procedure: ESOPHAGOGASTRODUODENOSCOPY WITH FOREIGN BODY REMOVAL;  Surgeon: Lorena Spencer MD;  Location: [facility] ENDOSCOPY;  Service:     ENDOSCOPY AND COLONOSCOPY      Showing tumor in the transverse colon.     ENDOSCOPY AND COLONOSCOPY  07/02/2014    EGD negative; colonoscopy with an anastomotic ulcer that was over sewn and cauterized.     EYE SURGERY      TOTAL KNEE ARTHROPLASTY Bilateral 2006          Palliative Care Psychosocial and Spiritual Screening    Living situation he was living at home but [facility]    not applicable       Physical Assessment:   /72 (BP Location: Right arm, Patient Position: Lying)   Pulse 90   Temp 97.4 °F (36.3 °C) (Oral)   Resp 15   Ht 177.8 cm (70\")   Wt 94.2 kg (207 lb 10.8 oz)   SpO2 92%   BMI 29.80 kg/m²    Palliative Performance Scale: Performance 10% based on the following measures: Ambulation: " Totally bed bound, Activity and Evidence of Disease: Unable to do any work, extensive evidence of disease, Self-Care: Total care required,  Intake: Mouth care only, LOC: Drowsy or comatose  Physical Exam       Medications:    Current Facility-Administered Medications:     LORazepam (ATIVAN) injection 0.5 mg, 0.5 mg, Intravenous, Q1H PRN, Cobler, Cleo L, APRN    morphine injection 1 mg, 1 mg, Intravenous, Q1H PRN, Cobler, Cleo L, APRN     Allergies:   No Known Allergies       Data (labs/images reviewed):   WBC   Date Value Ref Range Status   06/27/2025 6.90 3.40 - 10.80 10*3/mm3 Final     RBC   Date Value Ref Range Status   06/27/2025 2.99 (L) 4.14 - 5.80 10*6/mm3 Final     Hemoglobin   Date Value Ref Range Status   06/27/2025 9.7 (L) 13.0 - 17.7 g/dL Final     Hematocrit   Date Value Ref Range Status   06/27/2025 30.8 (L) 37.5 - 51.0 % Final     MCV   Date Value Ref Range Status   06/27/2025 103.0 (H) 79.0 - 97.0 fL Final     MCH   Date Value Ref Range Status   06/27/2025 32.4 26.6 - 33.0 pg Final     MCHC   Date Value Ref Range Status   06/27/2025 31.5 31.5 - 35.7 g/dL Final     RDW   Date Value Ref Range Status   06/27/2025 18.5 (H) 12.3 - 15.4 % Final     RDW-SD   Date Value Ref Range Status   06/27/2025 70.5 (H) 37.0 - 54.0 fl Final     MPV   Date Value Ref Range Status   06/27/2025 9.9 6.0 - 12.0 fL Final     Platelets   Date Value Ref Range Status   06/27/2025 169 140 - 450 10*3/mm3 Final     Neutrophil %   Date Value Ref Range Status   06/27/2025 81.8 (H) 42.7 - 76.0 % Final     Lymphocyte %   Date Value Ref Range Status   06/27/2025 6.1 (L) 19.6 - 45.3 % Final     Monocyte %   Date Value Ref Range Status   06/27/2025 10.3 5.0 - 12.0 % Final     Eosinophil %   Date Value Ref Range Status   06/27/2025 0.3 0.3 - 6.2 % Final     Basophil %   Date Value Ref Range Status   06/27/2025 0.3 0.0 - 1.5 % Final     Immature Grans %   Date Value Ref Range Status   06/27/2025 1.2 (H) 0.0 - 0.5 % Final     Neutrophils,  Absolute   Date Value Ref Range Status   06/27/2025 5.65 1.70 - 7.00 10*3/mm3 Final     Lymphocytes, Absolute   Date Value Ref Range Status   06/27/2025 0.42 (L) 0.70 - 3.10 10*3/mm3 Final     Monocytes, Absolute   Date Value Ref Range Status   06/27/2025 0.71 0.10 - 0.90 10*3/mm3 Final     Eosinophils, Absolute   Date Value Ref Range Status   06/27/2025 0.02 0.00 - 0.40 10*3/mm3 Final     Basophils, Absolute   Date Value Ref Range Status   06/27/2025 0.02 0.00 - 0.20 10*3/mm3 Final     Immature Grans, Absolute   Date Value Ref Range Status   06/27/2025 0.08 (H) 0.00 - 0.05 10*3/mm3 Final     nRBC   Date Value Ref Range Status   06/27/2025 0.0 0.0 - 0.2 /100 WBC Final        Lab Results   Component Value Date    GLUCOSE 140 (H) 06/27/2025    BUN 49.0 (H) 06/27/2025    CREATININE 2.01 (H) 06/27/2025     06/27/2025    K 3.8 06/27/2025     06/27/2025    CALCIUM 9.2 06/27/2025    PROTEINTOT 6.2 06/26/2025    ALBUMIN 3.5 06/26/2025    ALT 11 06/26/2025    AST 18 06/26/2025    ALKPHOS 86 06/26/2025    BILITOT 0.5 06/26/2025    GLOB 2.7 06/26/2025    AGRATIO 1.3 06/26/2025    BCR 24.4 06/27/2025    ANIONGAP 10.8 06/27/2025    EGFR 30.0 (L) 06/27/2025        XR Chest 1 View  Narrative: XR CHEST 1 VW-5/13/2025     HISTORY: Fatigue.     Heart size is mildly enlarged. Lungs appear free of acute infiltrates.  Cardiac pacemaker is seen in good position. There is mild thoracic  scoliosis. There is some aortic calcification.     Impression: 1. Cardiomegaly.  2. No acute infiltrates.        This report was finalized on 5/13/2025 11:36 AM by Dr. Fredrick Bennett M.D on Workstation: [id number]              Palliative Care Assessment and Recommendations: Sacha Whitehead is a 89 y.o. male with a primary palliative care diagnosis of cardiomyopathy. Palliative care was consulted for goals of care/comfort.     Prognosis and Palliative Performance Scale:  Performance 10% based on the following measures: Ambulation: Totally bed  bound, Activity and Evidence of Disease: Unable to do any work, extensive evidence of disease, Self-Care: Total care required,  Intake: Mouth care only, LOC: Drowsy or comatose  Disease State: Actively dying  Symptom Management:   Pain: 0  Shortness of Breath: 0  Constipation: 0  Other Palliative Symptoms: actively dying  Goals of Care Treatment Preferences   Palliative Care Decision Making Capacity: unable   Healthcare Surrogate: brandy Kaur  What is Most important to patient/family at this time: comfort  Code Status DNR    Other Recommendations: transfer orders in for 4P/hospice consult placed  Discussed plan with: RN and family      I spent from 2 to 2:17 in advance care planning discussing the above goals of care (total time 17 minutes)  After speaking with son Vincent, towards the end of the conversation, pt's heart rate dropped to 40 and I told Vincent he needed to come in if he desired as I thought his father was going to die in the next several minutes to hours,  Vincent stated he was on his way. Unfortunately, patient  prior to Vincent's arrival and I pronounced him at 2:27 PM.         Cleo Abbasi, APRN  2025 15:10 EDT

## 2025-06-27 NOTE — ED NOTES
Nursing report ED to floor  Sacha Whitehead  89 y.o.  male    Sacha Whitehead is a 89 y.o. male who presents to the ED c/o acute  abnormal labs. Patient found to have COLLEEN at facility. He has no complaints. Denies vomiting or diarrhea.     HPI :  HPI  Stated Reason for Visit: abn labs  History Obtained From: EMS    Chief Complaint  Chief Complaint   Patient presents with    Abnormal Lab       Admitting doctor:   Jonathon Gutiérrez MD    Admitting diagnosis:   The encounter diagnosis was COLLEEN (acute kidney injury).    Code status:   Current Code Status       Date Active Code Status Order ID Comments User Context       Prior            Allergies:   Patient has no known allergies.    Isolation:   No active isolations    Intake and Output  No intake or output data in the 24 hours ending 06/26/25 2020    Weight:   There were no vitals filed for this visit.    Most recent vitals:   Vitals:    06/26/25 1910 06/26/25 2000   BP: 119/93 132/68   Pulse: 88 86   Resp: 18    Temp: 97.4 °F (36.3 °C)    SpO2: 95% 99%       Active LDAs/IV Access:   Lines, Drains & Airways       Active LDAs       Name Placement date Placement time Site Days    Peripheral IV 06/26/25 1911 20 G Anterior;Left Forearm 06/26/25 1911  Forearm  less than 1                    Labs (abnormal labs have a star):   Labs Reviewed   COMPREHENSIVE METABOLIC PANEL - Abnormal; Notable for the following components:       Result Value    Glucose 137 (*)     BUN 50.0 (*)     Creatinine 2.23 (*)     eGFR 26.5 (*)     All other components within normal limits    Narrative:     GFR Categories in Chronic Kidney Disease (CKD)              GFR Category          GFR (mL/min/1.73)    Interpretation  G1                    90 or greater        Normal or high (1)  G2                    60-89                Mild decrease (1)  G3a                   45-59                Mild to moderate decrease  G3b                   30-44                Moderate to severe decrease  G4                    15-29                 Severe decrease  G5                    14 or less           Kidney failure    (1)In the absence of evidence of kidney disease, neither GFR category G1 or G2 fulfill the criteria for CKD.    eGFR calculation 2021 CKD-EPI creatinine equation, which does not include race as a factor   BNP (IN-HOUSE) - Abnormal; Notable for the following components:    proBNP 4,840.0 (*)     All other components within normal limits    Narrative:     This assay is used as an aid in the diagnosis of individuals suspected of having heart failure. It can be used as an aid in the diagnosis of acute decompensated heart failure (ADHF) in patients presenting with signs and symptoms of ADHF to the emergency department (ED). In addition, NT-proBNP of <300 pg/mL indicates ADHF is not likely.    Age Range Result Interpretation  NT-proBNP Concentration (pg/mL:      <50             Positive            >450                   Gray                 300-450                    Negative             <300    50-75           Positive            >900                  Gray                300-900                  Negative            <300      >75             Positive            >1800                  Gray                300-1800                  Negative            <300   CBC WITH AUTO DIFFERENTIAL - Abnormal; Notable for the following components:    RBC 2.84 (*)     Hemoglobin 9.1 (*)     Hematocrit 29.3 (*)     .2 (*)     MCHC 31.1 (*)     RDW 18.4 (*)     RDW-SD 68.5 (*)     Neutrophil % 80.6 (*)     Lymphocyte % 8.2 (*)     Lymphocytes, Absolute 0.45 (*)     All other components within normal limits   CBC AND DIFFERENTIAL    Narrative:     The following orders were created for panel order CBC & Differential.  Procedure                               Abnormality         Status                     ---------                               -----------         ------                     CBC Auto Differential[[id number]]        Abnormal             Final result                 Please view results for these tests on the individual orders.       EKG:   No orders to display       Meds given in ED:   Medications   sodium chloride 0.9 % flush 10 mL (has no administration in time range)   sodium chloride 0.9 % bolus 500 mL (has no administration in time range)       Imaging results:  No radiology results for the last day    Ambulatory status:   - with assist x2    Social issues:   Social History     Socioeconomic History    Marital status:      Spouse name: Leslie    Number of children: 4    Years of education: College   Tobacco Use    Smoking status: Former     Current packs/day: 0.00     Average packs/day: 1 pack/day for 10.0 years (10.0 ttl pk-yrs)     Types: Cigarettes     Quit date:      Years since quittin.5    Smokeless tobacco: Never    Tobacco comments:     40 YRS AGO   Vaping Use    Vaping status: Never Used   Substance and Sexual Activity    Alcohol use: Yes     Comment: 1 drink a year//Caffeine use: None    Drug use: No    Sexual activity: Not Currently       Peripheral Neurovascular  Peripheral Neurovascular (Adult)  Peripheral Neurovascular WDL: .WDL except, pulse assessment  Pulse Assessment: dorsalis pedis  Additional Documentation: Edema (Group)  Edema  Edema: leg, left, leg, right  Leg, Left Edema: 4+ (Severe)  Leg, Right Edema: 4+ (Severe)    Neuro Cognitive  Neuro Cognitive (Adult)  Cognitive/Neuro/Behavioral WDL: orientation, .WDL except  Orientation: disoriented to, situation, time  Sensory Impairment: other (see comments) (hard of hearing)  Additional Documentation: Sensory Impairment (Row)    Learning  Learning Assessment  Learning Readiness and Ability: physical limitation noted (hard of hearing)  Education Provided  Person Taught: patient  Teaching Method: verbal instruction  Teaching Focus: symptom/problem overview  Education Outcome Evaluation: needs reinforcement, verbalizes  understanding    Respiratory  Respiratory  Airway WDL: WDL  Additional Documentation: Breath Sounds (Group)  Respiratory WDL  Respiratory WDL: .WDL except, rhythm/pattern, cough  Rhythm/Pattern, Respiratory: deep  Cough Frequency: frequent  Cough Type: congested  Breath Sounds  All Lung Fields Breath Sounds: All Fields  All Lung Fields Breath Sounds: Anterior:, Posterior:, rhonchi    Abdominal Pain       Pain Assessments  Pain (Adult)  Preferred Pain Scale: FACES (Valenzuela-Baker FACES Pain Rating Scale)  FACES Pain Rating: Rest: 2-->hurts little bit  Pain Location: buttock    NIH Stroke Scale       Khurram Gorman RN  06/26/25 20:20 EDT

## 2025-06-27 NOTE — PLAN OF CARE
Problem: Adult Inpatient Plan of Care  Goal: Plan of Care Review  Outcome: Not Progressing  Goal: Patient-Specific Goal (Individualized)  Outcome: Not Progressing  Goal: Absence of Hospital-Acquired Illness or Injury  Outcome: Not Progressing  Goal: Optimal Comfort and Wellbeing  Outcome: Not Progressing  Goal: Readiness for Transition of Care  Outcome: Not Progressing  Intervention: Mutually Develop Transition Plan  Recent Flowsheet Documentation  Taken 6/27/2025 0033 by Jessica Kinney, RN  Transportation Anticipated: (ambulance transport)   other (see comments)   health plan transportation  Patient/Family Anticipated Services at Transition:   hospice care   skilled nursing   home health care  Patient/Family Anticipates Transition to: (spoke with family, Julian, son, was talking with [facility], not active but still interested) other (see comments)  Taken 6/27/2025 0026 by Jessica Kinney, RN  Equipment Currently Used at Home:   oxygen   wheelchair     Problem: Fall Injury Risk  Goal: Absence of Fall and Fall-Related Injury  Outcome: Not Progressing  Goal: Absence of Fall and Fall-Related Injury  Outcome: Not Progressing     Problem: Skin Injury Risk Increased  Goal: Skin Health and Integrity  Outcome: Not Progressing     Problem: Comorbidity Management  Goal: Blood Glucose Level Within Target Range  Outcome: Not Progressing  Goal: Maintenance of Heart Failure Symptom Control  Outcome: Not Progressing  Goal: Blood Pressure in Desired Range  Outcome: Not Progressing   Goal Outcome Evaluation:

## 2025-06-28 NOTE — DISCHARGE SUMMARY
Discharge summary    Date of admission 2025  Date of death 2025    Discussion  89-year-old white male with multiple medical problems admitted to emergency room with generalized weakness.  Patient workup in ER revealed acute kidney injury admitted for management.  Patient admitted and treated with IV fluid and supportive care and his diuretics and all nephrotoxic agents discontinued.  Patient was DNR and family inclined towards comfort care but he deteriorated but remain comfort and  and his body released with family.    Cause of death cardiopulmonary failure    EUSEBIA REEVES MD

## 2025-06-28 NOTE — CASE MANAGEMENT/SOCIAL WORK
Case Management Discharge Note      Final Note: pt          Selected Continued Care - Discharged on 2025 Admission date: 2025 - Discharge disposition:       Destination    No services have been selected for the patient.                Durable Medical Equipment    No services have been selected for the patient.                Dialysis/Infusion    No services have been selected for the patient.                Home Medical Care    No services have been selected for the patient.                Therapy    No services have been selected for the patient.                Community Resources    No services have been selected for the patient.                Community & DME    No services have been selected for the patient.                    Selected Continued Care - Prior Encounters Includes continued care and service providers with selected services from prior encounters from 3/28/2025 to 2025      Discharged on 2025 Admission date: 2025 - Discharge disposition: Skilled Nursing Facility (DC - External)      Destination       Service Provider Services Address Phone Fax Patient Preferred    [facility] [address] [id number] [phone] --                               Final Discharge Disposition Code: 20 -

## (undated) DEVICE — FRCP BX RADJAW4 NDL 2.8 240CM LG OG BX40

## (undated) DEVICE — CANN NASL CO2 TRULINK W/O2 A/

## (undated) DEVICE — BITEBLOCK OMNI BLOC

## (undated) DEVICE — Device: Brand: DEFENDO AIR/WATER/SUCTION AND BIOPSY VALVE

## (undated) DEVICE — THE DISPOSABLE RAPTOR GRASPING DEVICE IS USED TO GRASP TISSUE AND/OR RETRIEVE FOREIGN BODIES, EXCISED TISSUE AND STENTS DURING ENDOSCOPIC PROCEDURES.: Brand: RAPTOR

## (undated) DEVICE — TBG 02 CRUSH RESIST LF CLR 7FT

## (undated) DEVICE — TUBING, SUCTION, 1/4" X 10', STRAIGHT: Brand: MEDLINE